# Patient Record
Sex: MALE | Race: ASIAN | NOT HISPANIC OR LATINO | URBAN - METROPOLITAN AREA
[De-identification: names, ages, dates, MRNs, and addresses within clinical notes are randomized per-mention and may not be internally consistent; named-entity substitution may affect disease eponyms.]

---

## 2019-11-04 ENCOUNTER — INPATIENT (INPATIENT)
Facility: HOSPITAL | Age: 79
LOS: 13 days | Discharge: NURSING, REHAB OR LONG-TERM CARE | DRG: 240 | End: 2019-11-18
Attending: INTERNAL MEDICINE | Admitting: INTERNAL MEDICINE
Payer: MEDICARE

## 2019-11-04 VITALS
OXYGEN SATURATION: 97 % | SYSTOLIC BLOOD PRESSURE: 158 MMHG | HEIGHT: 64.57 IN | TEMPERATURE: 98 F | DIASTOLIC BLOOD PRESSURE: 88 MMHG | HEART RATE: 102 BPM | WEIGHT: 143.96 LBS | RESPIRATION RATE: 18 BRPM

## 2019-11-04 DIAGNOSIS — E11.9 TYPE 2 DIABETES MELLITUS WITHOUT COMPLICATIONS: ICD-10-CM

## 2019-11-04 DIAGNOSIS — L03.031 CELLULITIS OF RIGHT TOE: ICD-10-CM

## 2019-11-04 DIAGNOSIS — I10 ESSENTIAL (PRIMARY) HYPERTENSION: ICD-10-CM

## 2019-11-04 DIAGNOSIS — M79.674 PAIN IN RIGHT TOE(S): ICD-10-CM

## 2019-11-04 DIAGNOSIS — Z29.9 ENCOUNTER FOR PROPHYLACTIC MEASURES, UNSPECIFIED: ICD-10-CM

## 2019-11-04 DIAGNOSIS — Z98.890 OTHER SPECIFIED POSTPROCEDURAL STATES: Chronic | ICD-10-CM

## 2019-11-04 DIAGNOSIS — Z71.89 OTHER SPECIFIED COUNSELING: ICD-10-CM

## 2019-11-04 LAB
ALBUMIN SERPL ELPH-MCNC: 3.2 G/DL — LOW (ref 3.5–5)
ALP SERPL-CCNC: 94 U/L — SIGNIFICANT CHANGE UP (ref 40–120)
ALT FLD-CCNC: 13 U/L DA — SIGNIFICANT CHANGE UP (ref 10–60)
ANION GAP SERPL CALC-SCNC: 7 MMOL/L — SIGNIFICANT CHANGE UP (ref 5–17)
APTT BLD: 31.7 SEC — SIGNIFICANT CHANGE UP (ref 27.5–36.3)
AST SERPL-CCNC: 9 U/L — LOW (ref 10–40)
B-OH-BUTYR SERPL-SCNC: 0.4 MMOL/L — SIGNIFICANT CHANGE UP
BASOPHILS # BLD AUTO: 0.02 K/UL — SIGNIFICANT CHANGE UP (ref 0–0.2)
BASOPHILS NFR BLD AUTO: 0.2 % — SIGNIFICANT CHANGE UP (ref 0–2)
BILIRUB SERPL-MCNC: 0.8 MG/DL — SIGNIFICANT CHANGE UP (ref 0.2–1.2)
BUN SERPL-MCNC: 20 MG/DL — HIGH (ref 7–18)
CALCIUM SERPL-MCNC: 9.1 MG/DL — SIGNIFICANT CHANGE UP (ref 8.4–10.5)
CHLORIDE SERPL-SCNC: 101 MMOL/L — SIGNIFICANT CHANGE UP (ref 96–108)
CK MB BLD-MCNC: 2.5 % — SIGNIFICANT CHANGE UP (ref 0–3.5)
CK MB CFR SERPL CALC: 1.5 NG/ML — SIGNIFICANT CHANGE UP (ref 0–3.6)
CK SERPL-CCNC: 61 U/L — SIGNIFICANT CHANGE UP (ref 35–232)
CO2 SERPL-SCNC: 26 MMOL/L — SIGNIFICANT CHANGE UP (ref 22–31)
CREAT SERPL-MCNC: 1.04 MG/DL — SIGNIFICANT CHANGE UP (ref 0.5–1.3)
CRP SERPL-MCNC: 4.07 MG/DL — HIGH (ref 0–0.4)
EOSINOPHIL # BLD AUTO: 0.02 K/UL — SIGNIFICANT CHANGE UP (ref 0–0.5)
EOSINOPHIL NFR BLD AUTO: 0.2 % — SIGNIFICANT CHANGE UP (ref 0–6)
ERYTHROCYTE [SEDIMENTATION RATE] IN BLOOD: 45 MM/HR — HIGH (ref 0–20)
GLUCOSE BLDC GLUCOMTR-MCNC: 161 MG/DL — HIGH (ref 70–99)
GLUCOSE BLDC GLUCOMTR-MCNC: 243 MG/DL — HIGH (ref 70–99)
GLUCOSE BLDC GLUCOMTR-MCNC: 337 MG/DL — HIGH (ref 70–99)
GLUCOSE BLDC GLUCOMTR-MCNC: 358 MG/DL — HIGH (ref 70–99)
GLUCOSE BLDC GLUCOMTR-MCNC: 363 MG/DL — HIGH (ref 70–99)
GLUCOSE SERPL-MCNC: 394 MG/DL — HIGH (ref 70–99)
HCT VFR BLD CALC: 39.1 % — SIGNIFICANT CHANGE UP (ref 39–50)
HGB BLD-MCNC: 13.7 G/DL — SIGNIFICANT CHANGE UP (ref 13–17)
IMM GRANULOCYTES NFR BLD AUTO: 0.2 % — SIGNIFICANT CHANGE UP (ref 0–1.5)
INR BLD: 1.22 RATIO — HIGH (ref 0.88–1.16)
LACTATE SERPL-SCNC: 1.7 MMOL/L — SIGNIFICANT CHANGE UP (ref 0.7–2)
LYMPHOCYTES # BLD AUTO: 1.52 K/UL — SIGNIFICANT CHANGE UP (ref 1–3.3)
LYMPHOCYTES # BLD AUTO: 15.8 % — SIGNIFICANT CHANGE UP (ref 13–44)
MCHC RBC-ENTMCNC: 34 PG — SIGNIFICANT CHANGE UP (ref 27–34)
MCHC RBC-ENTMCNC: 35 GM/DL — SIGNIFICANT CHANGE UP (ref 32–36)
MCV RBC AUTO: 97 FL — SIGNIFICANT CHANGE UP (ref 80–100)
MONOCYTES # BLD AUTO: 0.7 K/UL — SIGNIFICANT CHANGE UP (ref 0–0.9)
MONOCYTES NFR BLD AUTO: 7.3 % — SIGNIFICANT CHANGE UP (ref 2–14)
NEUTROPHILS # BLD AUTO: 7.35 K/UL — SIGNIFICANT CHANGE UP (ref 1.8–7.4)
NEUTROPHILS NFR BLD AUTO: 76.3 % — SIGNIFICANT CHANGE UP (ref 43–77)
NRBC # BLD: 0 /100 WBCS — SIGNIFICANT CHANGE UP (ref 0–0)
PLATELET # BLD AUTO: 284 K/UL — SIGNIFICANT CHANGE UP (ref 150–400)
POTASSIUM SERPL-MCNC: 4.2 MMOL/L — SIGNIFICANT CHANGE UP (ref 3.5–5.3)
POTASSIUM SERPL-SCNC: 4.2 MMOL/L — SIGNIFICANT CHANGE UP (ref 3.5–5.3)
PROT SERPL-MCNC: 7.8 G/DL — SIGNIFICANT CHANGE UP (ref 6–8.3)
PROTHROM AB SERPL-ACNC: 13.6 SEC — HIGH (ref 10–12.9)
RBC # BLD: 4.03 M/UL — LOW (ref 4.2–5.8)
RBC # FLD: 12 % — SIGNIFICANT CHANGE UP (ref 10.3–14.5)
SODIUM SERPL-SCNC: 134 MMOL/L — LOW (ref 135–145)
TROPONIN I SERPL-MCNC: 0.02 NG/ML — SIGNIFICANT CHANGE UP (ref 0–0.04)
WBC # BLD: 9.63 K/UL — SIGNIFICANT CHANGE UP (ref 3.8–10.5)
WBC # FLD AUTO: 9.63 K/UL — SIGNIFICANT CHANGE UP (ref 3.8–10.5)

## 2019-11-04 PROCEDURE — 73660 X-RAY EXAM OF TOE(S): CPT | Mod: 26,RT

## 2019-11-04 PROCEDURE — 99223 1ST HOSP IP/OBS HIGH 75: CPT

## 2019-11-04 PROCEDURE — 99222 1ST HOSP IP/OBS MODERATE 55: CPT

## 2019-11-04 PROCEDURE — 99285 EMERGENCY DEPT VISIT HI MDM: CPT

## 2019-11-04 PROCEDURE — 71045 X-RAY EXAM CHEST 1 VIEW: CPT | Mod: 26

## 2019-11-04 RX ORDER — INSULIN GLARGINE 100 [IU]/ML
5 INJECTION, SOLUTION SUBCUTANEOUS AT BEDTIME
Refills: 0 | Status: DISCONTINUED | OUTPATIENT
Start: 2019-11-04 | End: 2019-11-05

## 2019-11-04 RX ORDER — POVIDONE-IODINE 5 %
1 AEROSOL (ML) TOPICAL ONCE
Refills: 0 | Status: COMPLETED | OUTPATIENT
Start: 2019-11-04 | End: 2019-11-04

## 2019-11-04 RX ORDER — LISINOPRIL 2.5 MG/1
2.5 TABLET ORAL DAILY
Refills: 0 | Status: DISCONTINUED | OUTPATIENT
Start: 2019-11-04 | End: 2019-11-05

## 2019-11-04 RX ORDER — INSULIN LISPRO 100/ML
VIAL (ML) SUBCUTANEOUS
Refills: 0 | Status: DISCONTINUED | OUTPATIENT
Start: 2019-11-04 | End: 2019-11-05

## 2019-11-04 RX ORDER — INFLUENZA VIRUS VACCINE 15; 15; 15; 15 UG/.5ML; UG/.5ML; UG/.5ML; UG/.5ML
0.5 SUSPENSION INTRAMUSCULAR ONCE
Refills: 0 | Status: DISCONTINUED | OUTPATIENT
Start: 2019-11-04 | End: 2019-11-18

## 2019-11-04 RX ORDER — SODIUM CHLORIDE 9 MG/ML
1000 INJECTION INTRAMUSCULAR; INTRAVENOUS; SUBCUTANEOUS
Refills: 0 | Status: DISCONTINUED | OUTPATIENT
Start: 2019-11-04 | End: 2019-11-07

## 2019-11-04 RX ORDER — OXYCODONE AND ACETAMINOPHEN 5; 325 MG/1; MG/1
1 TABLET ORAL EVERY 6 HOURS
Refills: 0 | Status: DISCONTINUED | OUTPATIENT
Start: 2019-11-04 | End: 2019-11-07

## 2019-11-04 RX ORDER — INSULIN LISPRO 100/ML
4 VIAL (ML) SUBCUTANEOUS ONCE
Refills: 0 | Status: COMPLETED | OUTPATIENT
Start: 2019-11-04 | End: 2019-11-04

## 2019-11-04 RX ORDER — VANCOMYCIN HCL 1 G
1000 VIAL (EA) INTRAVENOUS ONCE
Refills: 0 | Status: COMPLETED | OUTPATIENT
Start: 2019-11-04 | End: 2019-11-04

## 2019-11-04 RX ORDER — SENNA PLUS 8.6 MG/1
2 TABLET ORAL AT BEDTIME
Refills: 0 | Status: DISCONTINUED | OUTPATIENT
Start: 2019-11-04 | End: 2019-11-07

## 2019-11-04 RX ORDER — OXYCODONE AND ACETAMINOPHEN 5; 325 MG/1; MG/1
2 TABLET ORAL EVERY 6 HOURS
Refills: 0 | Status: DISCONTINUED | OUTPATIENT
Start: 2019-11-04 | End: 2019-11-07

## 2019-11-04 RX ORDER — MORPHINE SULFATE 50 MG/1
4 CAPSULE, EXTENDED RELEASE ORAL ONCE
Refills: 0 | Status: DISCONTINUED | OUTPATIENT
Start: 2019-11-04 | End: 2019-11-04

## 2019-11-04 RX ORDER — PIPERACILLIN AND TAZOBACTAM 4; .5 G/20ML; G/20ML
3.38 INJECTION, POWDER, LYOPHILIZED, FOR SOLUTION INTRAVENOUS ONCE
Refills: 0 | Status: COMPLETED | OUTPATIENT
Start: 2019-11-04 | End: 2019-11-04

## 2019-11-04 RX ORDER — OXYCODONE AND ACETAMINOPHEN 5; 325 MG/1; MG/1
1 TABLET ORAL EVERY 6 HOURS
Refills: 0 | Status: DISCONTINUED | OUTPATIENT
Start: 2019-11-04 | End: 2019-11-04

## 2019-11-04 RX ORDER — PIPERACILLIN AND TAZOBACTAM 4; .5 G/20ML; G/20ML
3.38 INJECTION, POWDER, LYOPHILIZED, FOR SOLUTION INTRAVENOUS EVERY 8 HOURS
Refills: 0 | Status: DISCONTINUED | OUTPATIENT
Start: 2019-11-04 | End: 2019-11-05

## 2019-11-04 RX ORDER — ENOXAPARIN SODIUM 100 MG/ML
40 INJECTION SUBCUTANEOUS DAILY
Refills: 0 | Status: DISCONTINUED | OUTPATIENT
Start: 2019-11-04 | End: 2019-11-06

## 2019-11-04 RX ORDER — ACETAMINOPHEN 500 MG
650 TABLET ORAL EVERY 6 HOURS
Refills: 0 | Status: DISCONTINUED | OUTPATIENT
Start: 2019-11-04 | End: 2019-11-07

## 2019-11-04 RX ORDER — VANCOMYCIN HCL 1 G
1000 VIAL (EA) INTRAVENOUS EVERY 12 HOURS
Refills: 0 | Status: DISCONTINUED | OUTPATIENT
Start: 2019-11-04 | End: 2019-11-05

## 2019-11-04 RX ORDER — OXYCODONE AND ACETAMINOPHEN 5; 325 MG/1; MG/1
1 TABLET ORAL ONCE
Refills: 0 | Status: DISCONTINUED | OUTPATIENT
Start: 2019-11-04 | End: 2019-11-04

## 2019-11-04 RX ORDER — SODIUM CHLORIDE 9 MG/ML
1000 INJECTION INTRAMUSCULAR; INTRAVENOUS; SUBCUTANEOUS ONCE
Refills: 0 | Status: COMPLETED | OUTPATIENT
Start: 2019-11-04 | End: 2019-11-04

## 2019-11-04 RX ADMIN — PIPERACILLIN AND TAZOBACTAM 25 GRAM(S): 4; .5 INJECTION, POWDER, LYOPHILIZED, FOR SOLUTION INTRAVENOUS at 22:58

## 2019-11-04 RX ADMIN — OXYCODONE AND ACETAMINOPHEN 2 TABLET(S): 5; 325 TABLET ORAL at 17:57

## 2019-11-04 RX ADMIN — Medication 1: at 12:33

## 2019-11-04 RX ADMIN — PIPERACILLIN AND TAZOBACTAM 200 GRAM(S): 4; .5 INJECTION, POWDER, LYOPHILIZED, FOR SOLUTION INTRAVENOUS at 08:01

## 2019-11-04 RX ADMIN — Medication 2: at 17:45

## 2019-11-04 RX ADMIN — SENNA PLUS 2 TABLET(S): 8.6 TABLET ORAL at 22:58

## 2019-11-04 RX ADMIN — Medication 5: at 22:58

## 2019-11-04 RX ADMIN — OXYCODONE AND ACETAMINOPHEN 2 TABLET(S): 5; 325 TABLET ORAL at 23:44

## 2019-11-04 RX ADMIN — Medication 5 MILLIGRAM(S): at 22:58

## 2019-11-04 RX ADMIN — SODIUM CHLORIDE 1000 MILLILITER(S): 9 INJECTION INTRAMUSCULAR; INTRAVENOUS; SUBCUTANEOUS at 06:13

## 2019-11-04 RX ADMIN — LISINOPRIL 2.5 MILLIGRAM(S): 2.5 TABLET ORAL at 17:47

## 2019-11-04 RX ADMIN — ENOXAPARIN SODIUM 40 MILLIGRAM(S): 100 INJECTION SUBCUTANEOUS at 16:37

## 2019-11-04 RX ADMIN — MORPHINE SULFATE 4 MILLIGRAM(S): 50 CAPSULE, EXTENDED RELEASE ORAL at 08:01

## 2019-11-04 RX ADMIN — OXYCODONE AND ACETAMINOPHEN 1 TABLET(S): 5; 325 TABLET ORAL at 05:42

## 2019-11-04 RX ADMIN — Medication 4 UNIT(S): at 09:29

## 2019-11-04 RX ADMIN — Medication 1 APPLICATION(S): at 13:45

## 2019-11-04 RX ADMIN — MORPHINE SULFATE 4 MILLIGRAM(S): 50 CAPSULE, EXTENDED RELEASE ORAL at 09:17

## 2019-11-04 RX ADMIN — Medication 250 MILLIGRAM(S): at 17:57

## 2019-11-04 RX ADMIN — SODIUM CHLORIDE 70 MILLILITER(S): 9 INJECTION INTRAMUSCULAR; INTRAVENOUS; SUBCUTANEOUS at 18:07

## 2019-11-04 RX ADMIN — Medication 250 MILLIGRAM(S): at 08:59

## 2019-11-04 RX ADMIN — INSULIN GLARGINE 5 UNIT(S): 100 INJECTION, SOLUTION SUBCUTANEOUS at 22:57

## 2019-11-04 RX ADMIN — OXYCODONE AND ACETAMINOPHEN 2 TABLET(S): 5; 325 TABLET ORAL at 16:37

## 2019-11-04 RX ADMIN — OXYCODONE AND ACETAMINOPHEN 1 TABLET(S): 5; 325 TABLET ORAL at 07:03

## 2019-11-04 RX ADMIN — OXYCODONE AND ACETAMINOPHEN 2 TABLET(S): 5; 325 TABLET ORAL at 22:58

## 2019-11-04 NOTE — CONSULT NOTE ADULT - SUBJECTIVE AND OBJECTIVE BOX
HPI:    79yoM with h/o DM, HTN, presents with toe pain. Reports R 2nd toe pain since tripping and falling on it at [employer] 2 days ago. Saw PMD Dr. David Domínguez who prescribed gabapentin and oxycodone, pt states no imaging at that time. Reports numbness to the toe as well. Lives alone, ambulates with cane at baseline, states 2/2 the pain has been having difficulty getting around and has not been eating, and feels that he needs to stay in the hospital. Denies CP, SOB.    Interval HPI:   Pt tripped on R toe and fell; noticed a cut on a toe, applied tar ointment and controlled pain with medications. Overnight, excruciating pain with increased numbness, came to ED for evaluation. Pt with h/o HTN and diabetes, however does not take medications regularly due to upset stomach. Finger sticks at home ranges from 130-40. No h/o foot wounds, no prior vascular testing, not on AC. Denies fever or chills, CP or SOB. At home, intermittent cold sensation of feet but no pain a/w claudication.       REVIEW OF SYSTEMS:   Negative except stated above in HPI      Allergies    No Known Allergies    Intolerances    	    MEDICATIONS:    vancomycin  IVPB 1000 milliGRAM(s) IV Intermittent once        PAST MEDICAL & SURGICAL HISTORY:  HTN  DM            T(C): 36.8 (11-04-19 @ 07:10), Max: 36.8 (11-04-19 @ 07:10)  HR: 70 (11-04-19 @ 07:10) (70 - 102)  BP: 142/59 (11-04-19 @ 07:10) (142/59 - 158/88)  RR: 18 (11-04-19 @ 07:10) (18 - 18)  SpO2: 98% (11-04-19 @ 07:10) (97% - 98%)  I&O's Summary      PHYSICAL EXAM:   General: Alert and oriented, not in acute distress  Cardiovascular: Regular rate and rhythm  Respiratory: Breathing unlabored  Gastrointestinal:  Soft, nondistended, nontender  Extremities: Full ROM  Foot Exam:    R foot- 2nd phalangeal erythema, swelling, distally purple, ~1cm open wound on posterior 2nd toe, grossly appears infected with extensive cellulitis spreading to dorsum of foot, markedly tender to touch, no crepitus or induration beyond erythematous margin; sensation and motor function intact  DP palpable on both feet, warm to touch      LABS:	 	    CBC Full  -  ( 04 Nov 2019 06:14 )  WBC Count : 9.63 K/uL  Hemoglobin : 13.7 g/dL  Hematocrit : 39.1 %  Platelet Count - Automated : 284 K/uL  Mean Cell Volume : 97.0 fl  Mean Cell Hemoglobin : 34.0 pg  Mean Cell Hemoglobin Concentration : 35.0 gm/dL  Auto Neutrophil # : 7.35 K/uL  Auto Lymphocyte # : 1.52 K/uL  Auto Monocyte # : 0.70 K/uL  Auto Eosinophil # : 0.02 K/uL  Auto Basophil # : 0.02 K/uL  Auto Neutrophil % : 76.3 %  Auto Lymphocyte % : 15.8 %  Auto Monocyte % : 7.3 %  Auto Eosinophil % : 0.2 %  Auto Basophil % : 0.2 %    11-04    134<L>  |  101  |  20<H>  ----------------------------<  394<H>  4.2   |  26  |  1.04    Ca    9.1      04 Nov 2019 06:14    TPro  7.8  /  Alb  3.2<L>  /  TBili  0.8  /  DBili  x   /  AST  9<L>  /  ALT  13  /  AlkPhos  94  11-04

## 2019-11-04 NOTE — H&P ADULT - ATTENDING COMMENTS
Patient seen and examined ; case was discussed with the admitting resident    ROS: as in the HPI; all other ROS negative    SH and family history as above    Vital Signs Last 24 Hrs  T(C): 36.8 (04 Nov 2019 07:10), Max: 36.8 (04 Nov 2019 07:10)  T(F): 98.3 (04 Nov 2019 07:10), Max: 98.3 (04 Nov 2019 07:10)  HR: 70 (04 Nov 2019 07:10) (70 - 102)  BP: 142/59 (04 Nov 2019 07:10) (142/59 - 158/88)  BP(mean): --  RR: 18 (04 Nov 2019 07:10) (18 - 18)  SpO2: 98% (04 Nov 2019 07:10) (97% - 98%)    GEN: NAD  HEENT- normocephalic; mouth moist  CVS- S1S2+  LUNGS- clear to auscultation; no wheezing  ABD: Soft , nontender, nondistended, Bowel sounds are present  EXTREMITY: no calf tenderness, no cyanosis, no edema, RLE - 2nd digit with purpuric appearance with ecchymosis over dorsum of foot, good pulses b/l, no crepitus, tender, no obvious drainage. LLE without injury, good pulse   NEURO: AAOx3; non focal neurologic exam; cranial nerves grossly intact  PSYCH: normal affect and behavior  BACK: no swelling or mass;   VASCULAR: ++ distal peripheral pulses  SKIN: warm and dry.       Labs Reviewed:                         13.7   9.63  )-----------( 284      ( 04 Nov 2019 06:14 )             39.1     11-04    134<L>  |  101  |  20<H>  ----------------------------<  394<H>  4.2   |  26  |  1.04    Ca    9.1      04 Nov 2019 06:14    TPro  7.8  /  Alb  3.2<L>  /  TBili  0.8  /  DBili  x   /  AST  9<L>  /  ALT  13  /  AlkPhos  94  11-04    CARDIAC MARKERS ( 04 Nov 2019 06:14 )  0.021 ng/mL / x     / x     / x     / x      PT/INR - ( 04 Nov 2019 06:14 )   PT: 13.6 sec;   INR: 1.22 ratio    PTT - ( 04 Nov 2019 06:14 )  PTT:31.7 sec  BNP:   MEDICATIONS  (STANDING):  vancomycin  IVPB 1000 milliGRAM(s) IV Intermittent once  Foot Xray reviewed    80 y/o M with DM, HTN admitted with 2nd right toe infection, possible underlying OM. Initially occurred with trauma, tripped over pothole on 10/29/19, had worsening pain and then noticed black color on toe with severe pain. Patient does not know his medications or his pharmacy but has regular follow up with his pcp.     1. Diabetic foot wound, cellulitis possible gangrene- empiric antibiotics, podiatry consult, f/u final read of xray, esr elevated. Continue broad spectrum coverage, appreciate ID consultation.   2. Uncontrolled DM with hyperglycemia- not on insulin at home, check A1C, goal 140-180mg/dL would give weight based basal dose and monitor SSI requirements. Might need endocrinology consult if needing insulin prior to dc.   3. Dehydration- osmotic diuresis and decreased po   4. HTN- unknown medical regimen, would start ACE-i in setting of DM, records from PCP would be helpful.   5. HLD   6. Pseudohyponatremia   Plan of care discussed with patient ;  all questions and concerns were addressed.  Discussed with Patient's family Patient seen and examined ; case was discussed with the admitting resident    ROS: as in the HPI; all other ROS negative    SH as above FH positive for Diabetes    Vital Signs Last 24 Hrs  T(C): 36.8 (04 Nov 2019 07:10), Max: 36.8 (04 Nov 2019 07:10)  T(F): 98.3 (04 Nov 2019 07:10), Max: 98.3 (04 Nov 2019 07:10)  HR: 70 (04 Nov 2019 07:10) (70 - 102)  BP: 142/59 (04 Nov 2019 07:10) (142/59 - 158/88)  BP(mean): --  RR: 18 (04 Nov 2019 07:10) (18 - 18)  SpO2: 98% (04 Nov 2019 07:10) (97% - 98%)    GEN: NAD  HEENT- normocephalic; mouth moist  CVS- S1S2+  LUNGS- clear to auscultation; no wheezing  ABD: Soft , nontender, nondistended, Bowel sounds are present  EXTREMITY: no calf tenderness, no cyanosis, no edema, RLE - 2nd digit with purpuric appearance with ecchymosis over dorsum of foot, good pulses b/l, no crepitus, tender, no obvious drainage. LLE without injury, good pulse   NEURO: AAOx3; non focal neurologic exam; cranial nerves grossly intact  PSYCH: normal affect and behavior  BACK: no swelling or mass;   VASCULAR: ++ distal peripheral pulses  SKIN: warm and dry.       Labs Reviewed:                         13.7   9.63  )-----------( 284      ( 04 Nov 2019 06:14 )             39.1     11-04    134<L>  |  101  |  20<H>  ----------------------------<  394<H>  4.2   |  26  |  1.04    Ca    9.1      04 Nov 2019 06:14    TPro  7.8  /  Alb  3.2<L>  /  TBili  0.8  /  DBili  x   /  AST  9<L>  /  ALT  13  /  AlkPhos  94  11-04    CARDIAC MARKERS ( 04 Nov 2019 06:14 )  0.021 ng/mL / x     / x     / x     / x      PT/INR - ( 04 Nov 2019 06:14 )   PT: 13.6 sec;   INR: 1.22 ratio    PTT - ( 04 Nov 2019 06:14 )  PTT:31.7 sec  BNP:   MEDICATIONS  (STANDING):  vancomycin  IVPB 1000 milliGRAM(s) IV Intermittent once  Foot Xray reviewed    78 y/o M with DM, HTN admitted with 2nd right toe infection, possible underlying OM. Initially occurred with trauma, tripped over pothole on 10/29/19, had worsening pain and then noticed black color on toe with severe pain. Patient does not know his medications or his pharmacy but has regular follow up with his pcp.     1. Diabetic foot wound, cellulitis possible gangrene- empiric antibiotics, podiatry consult, f/u final read of xray, esr elevated. Continue broad spectrum coverage, appreciate ID consultation.   2. Uncontrolled DM with hyperglycemia- not on insulin at home, check A1C, goal 140-180mg/dL would give weight based basal dose and monitor SSI requirements. Might need endocrinology consult if needing insulin prior to dc.   3. Dehydration- osmotic diuresis and decreased po   4. HTN- unknown medical regimen, would start ACE-i in setting of DM, records from PCP would be helpful.   5. HLD   6. Pseudohyponatremia   Plan of care discussed with patient ;  all questions and concerns were addressed.  Discussed with Patient's family

## 2019-11-04 NOTE — H&P ADULT - ASSESSMENT
Serbian speaking male from home lives alone   no fever   PMH DM , HTn , Cholesterol  PSH open heart surgery       Pt is FULL CODE.  pt is admitted for management of Cellulitis     right 2nd Toeleg Cellulitis with erythema, swelling, distally purple  - warm to touch with some tenderness and cool at the tip 79 year old Syriac speaking male from home lives alone ambulates with cane at baseline with PMH of DM, HTN and PSH open heart surgery presents with toe pain. Reports R 2nd toe pain and wound since tripping and falling on it at [employer] on october 29th while wearing slippers . Saw PMD Dr. David Domínguez who prescribed gabapentin and oxycodone, noticed a cut on a toe, applied tar ointment and controlled pain with medications. pt states no imaging at that time. pt states 2/2 the pain has been having difficulty getting around and has not been eating, and feels that he needs to stay in the hospital pt reported excruciating pain with increased numbness and black discoloration on toe with severe pain intermittent cold sensation of feet.  No h/o foot wounds, no prior vascular testing, not on AC. pt denies fever or chills, CP or SOB. nausea , vomiting , diarrhea , exposure to dirty water or mosquitoes bites.     Pt is FULL CODE.  pt is admitted for management of Cellulitis     ****Morning team to contact patients pharmacy for home med rec  [phone] or [phone] **** Pt does not remember meds and I called pharmacy but no response

## 2019-11-04 NOTE — ED PROVIDER NOTE - PHYSICAL EXAMINATION
Afebrile, hemodynamically stable, saturating well  NAD, nontoxic appearing  Head NCAT  EOMI grossly, anicteric  MM dry  RRR, nml S1/S2, no m/r/g  Lungs CTAB, no w/r/r  Abd soft, NT, ND, nml BS, no rebound or guarding  AAO, CN's 3-12 grossly intact  Skin warm, dry  R 2nd toe with purplish discoloration, ventral abrasion, erythema spreading to dorsal foot, small hematoma to the tip of toe and generalized swelling, no gross deformity, no crepitus

## 2019-11-04 NOTE — H&P ADULT - PROBLEM SELECTOR PLAN 3
- Pt does not remember his meds   - will start Lisinopril  for now with parameters (given hx of DM)  - Monitor BP closely and adjust medications if clinically indicated.  - DASH diet.    **** Morning team to contact patients pharmacy for home med rec  [phone] or [phone] **** Pt does not remember meds and I called pharmacy but no response

## 2019-11-04 NOTE — ED PROVIDER NOTE - CLINICAL SUMMARY MEDICAL DECISION MAKING FREE TEXT BOX
No e/o fracture. Concern for cellulitis, have low suspicion for nec fasc. Pt with pain despite PO meds, requiring IV morphine, with reported numbness, have mild concern for possible compartment syndrome of the toe. No e/o DKA. D/w vasc surg who will see pt. Given abx and antibiotics. Patient well appearing, hemodynamically stable. Admitted to internal medicine for further monitoring, w/u, and care. No e/o fracture. Concern for cellulitis, have low suspicion for nec fasc. Pt with pain despite PO meds, requiring IV morphine, with reported numbness, have mild concern for possible compartment syndrome of the toe. No e/o DKA. D/w vasc surg who will see pt. Given abx and antibiotics. Patient well appearing, hemodynamically stable. Admitted to internal medicine for further monitoring, w/u, and care. D/w MAR and will also f/u troponin and attempt to contact podiatry. No e/o fracture. Concern for cellulitis, have low suspicion for nec fasc. Pt with pain despite PO meds, requiring IV morphine, with reported numbness, have mild concern for possible compartment syndrome of the toe. No e/o DKA. D/w vasc surg who will see pt. Given abx and antibiotics. Patient well appearing, hemodynamically stable. Admitted to internal medicine for further monitoring, w/u, and care. D/w MAR and will also f/u troponin (asymptomatic in terms of ACS) and attempt to contact podiatry.

## 2019-11-04 NOTE — ED ADULT TRIAGE NOTE - CHIEF COMPLAINT QUOTE
tripped on a rock x 2days ago, with swelling and discoloration to right second toe tripped on a rock x 2days ago, with swelling and discoloration to right second,middle  toe

## 2019-11-04 NOTE — ED PROVIDER NOTE - PROGRESS NOTE DETAILS
D/w Janneth of surgery for vascular consult, who will see patient. Unsuccessful at reaching podiatry attending or resident to date

## 2019-11-04 NOTE — H&P ADULT - PROBLEM SELECTOR PLAN 5
Goals of care discussed with patient  meaning of CPR described in detail and wants everything to be done after understanding risk associated with age.  Pt is FULL CODE.

## 2019-11-04 NOTE — CONSULT NOTE ADULT - SUBJECTIVE AND OBJECTIVE BOX
Chief Complaint : Patient is a 79y old  Male who presents with a chief complaint of 2nd right toe pain (04 Nov 2019 08:38)    Pt was seen bedside in ED holding complaining of pain in his right second toe. Pt states he fell four days ago and came for pain. Pt states he is a diabetic but does not like to take his medications. Pt states he does not see a foot doctor. Pt states he has pain and numbness in his feet and had the pain and numbness before the fall as well.       Patient admits to  (-) Fevers, (-) Chills, (-) Nausea, (-) Vomiting, (-) Shortness of Breath      PMH:   PSH:    Allergies:No Known Allergies      Labs:                          13.7   9.63  )-----------( 284      ( 04 Nov 2019 06:14 )             39.1     WBC Trend  9.63 Date (11-04 @ 06:14)      Chem  11-04    134<L>  |  101  |  20<H>  ----------------------------<  394<H>  4.2   |  26  |  1.04    Ca    9.1      04 Nov 2019 06:14    TPro  7.8  /  Alb  3.2<L>  /  TBili  0.8  /  DBili  x   /  AST  9<L>  /  ALT  13  /  AlkPhos  94  11-04          T(F): 98.3 (11-04-19 @ 07:10), Max: 98.3 (11-04-19 @ 07:10)  HR: 70 (11-04-19 @ 07:10) (70 - 102)  BP: 142/59 (11-04-19 @ 07:10) (142/59 - 158/88)  RR: 18 (11-04-19 @ 07:10) (18 - 18)  SpO2: 98% (11-04-19 @ 07:10) (97% - 98%)  Wt(kg): --    O:   General: Pleasant  male NAD & AOX3.    Integument:  Skin warm, dry and supple bilateral.    Derm: right second digit has distal hematoma -fluctuance, erythema dorsal foot and plantar second digit, 0.5x0.4x0.1cm wound plantar second digit with surrounding erythema, no drainage, -PTB, -malodor   Vascular: Dorsalis Pedis and Posterior Tibial pulses 2/4.  Capillary re-fill time less then 3 seconds digits 1-5 bilateral. TG warm to cool b/l, cool to the second digit   Neuro: Protective sensation diminished to the level of the digits bilateral.  MSK: Muscle strength 5/5 all major muscle groups bilateral.      A: distal hematoma right second digit   plantar ulcer right second digit       P:   Chart reviewed and Patient evaluated  Discussed diagnosis and treatment with patient  X-rays reviewed - negative for fracture   Ordered LESLIE  Will monitor wound   Apply betadine DSD to wound   Pt to heel WB right foot, full WB left foot   Discussed importance of daily foot examinations and proper shoe gear and to importance of lower Fasting Blood Glucose levels.   Discussed with Attending

## 2019-11-04 NOTE — ED ADULT NURSE NOTE - NSIMPLEMENTINTERV_GEN_ALL_ED
Implemented All Fall Risk Interventions:  [place] to call system. Call bell, personal items and telephone within reach. Instruct patient to call for assistance. Room bathroom lighting operational. Non-slip footwear when patient is off stretcher. Physically safe environment: no spills, clutter or unnecessary equipment. Stretcher in lowest position, wheels locked, appropriate side rails in place. Provide visual cue, wrist band, yellow gown, etc. Monitor gait and stability. Monitor for mental status changes and reorient to person, place, and time. Review medications for side effects contributing to fall risk. Reinforce activity limits and safety measures with patient and family.

## 2019-11-04 NOTE — H&P ADULT - PROBLEM SELECTOR PLAN 1
- p/w right 2nd Toeleg Cellulitis with erythema, swelling, distally purple  - warm to touch with some tenderness and cool at the tip   - s/p Vanco adn Zosyn in ED  - No Leukocytosis   - Afebrile   - c/w vanco and Zosyn   - Recommended right leg elevation to the patient  - lactate 1.7  - ESR 45   - CRP 4.07  - x-ray right foot : no osteomyelitis of toes   - c/w pain meds Tylenol and oxycodone PRN   - c/w senna and dulcolax  - f/u LESLIE   - f/u Blood Cx (Specimen received)  - f/u swab cx of wound  - f/u PT  - Podiatry consult noted  - Vacular Consult noted   ** ID consulted Dr. Corral

## 2019-11-04 NOTE — H&P ADULT - PROBLEM SELECTOR PLAN 4
IMPROVE VTE Individual Risk Assessment    RISK                                                                Points    [  ] Previous VTE                                                  3    [  ] Thrombophilia                                               2    [  ] Lower limb paralysis                                      2        (unable to hold up >15 seconds)    [  ] Current Cancer                                              2         (within 6 months)  [X] Immobilization > 24 hrs                                1  [  ] ICU/CCU stay > 24 hours                              1  [X] Age > 60                                                      1    IMPROVE VTE Score _____2____  c/w Lovenox 40 mg qd

## 2019-11-04 NOTE — ED PROVIDER NOTE - CARE PLAN
Principal Discharge DX:	Toe pain, right  Secondary Diagnosis:	Cellulitis of toe of right foot  Secondary Diagnosis:	Ambulatory dysfunction  Secondary Diagnosis:	Dehydration  Secondary Diagnosis:	Hyperglycemia

## 2019-11-04 NOTE — CONSULT NOTE ADULT - CONSULT REASON
Toe swelling and pain out of proportion Toe swelling and pain out of proportion, r/o compartment syndrome

## 2019-11-04 NOTE — H&P ADULT - HISTORY OF PRESENT ILLNESS
79 year old Croatian speaking male from home lives alone ambulates with cane at baseline with PMH of DM, HTN and PSH open heart surgery presents with toe pain. Reports R 2nd toe pain and wound since tripping and falling on it at [employer] on october 29th while wearing slippers . Saw PMD Dr. David Domínguez who prescribed gabapentin and oxycodone, noticed a cut on a toe, applied tar ointment and controlled pain with medications. pt states no imaging at that time. pt states 2/2 the pain has been having difficulty getting around and has not been eating, and feels that he needs to stay in the hospital pt reported excruciating pain with increased numbness and black discoloration on toe with severe pain intermittent cold sensation of feet.  No h/o foot wounds, no prior vascular testing, not on AC. pt denies fever or chills, CP or SOB. nausea , vomiting , diarrhea , exposure to dirty water or mosquitoes bites.     Pt is FULL CODE.

## 2019-11-04 NOTE — ED ADULT NURSE NOTE - OBJECTIVE STATEMENT
Pt was BIB EMS reporting pain to right second toe after tripping two days ago outside. Denied head injury. HX Diabetes. On assessment, pt reports that he has not eaten in 2 days because he can not ambulate. Toe is swollen, ecchymotic, redness to top of foot, and laceration in between great toe and second toe. Cane at bedside. All fall precautions in place. Pt was BIB EMS reporting pain to right second toe after tripping two days ago at grocery store. Denied head injury. HX Diabetes. On assessment, pt reports that he has not eaten in 2 days because he can not ambulate. Toe is swollen, ecchymotic, redness to top of foot, and skin breakdown to second toe. Cane at bedside. All fall precautions in place.

## 2019-11-04 NOTE — CONSULT NOTE ADULT - ASSESSMENT
79 yoM with h/o HTN, DM presents with infected R 2nd toe wound    no leukocytosis, afebrile, wound grossly appears infected with extending cellulitis, s/m/n/v intact    - consult podiatry, spoke to Dr. Green, f/u rec  - abx  - swab cx of wound  -   -   -  - 79 yoM with h/o HTN, DM presents with infected R 2nd toe wound    no leukocytosis, afebrile, wound grossly appears infected with extending cellulitis and possible gangrene distally, s/m/n/v intact    - consult podiatry, spoke to Dr. Green, f/u rec  - abx, f/u ID rec  - swab cx of wound  - glucose control  -   -  - 79 yoM with h/o HTN, DM presents with infected R 2nd toe wound    no leukocytosis, afebrile, wound grossly appears infected with extending cellulitis and possible gangrene distally, s/m/n/v intact with palpable DP b/l with good perfusion    - consult podiatry, spoke to Dr. Green, f/u rec  - abx, f/u ID rec  - swab cx of wound  - glucose control  - no vascular intervention indicated at this time  - pain control as needed  - d/w attending 79 yoM with h/o HTN, DM presents with infected R 2nd toe wound    no leukocytosis, afebrile, wound grossly appears infected with extending cellulitis and possible gangrene distally, s/m/n/v intact with palpable DP b/l with good perfusion    - consult podiatry, spoke to Dr. Green, f/u rec  - abx, f/u ID rec  - swab cx of wound  - glucose control  - obtain LESLIE/PVR  - pain control as needed  - d/w attending

## 2019-11-04 NOTE — ED PROVIDER NOTE - OBJECTIVE STATEMENT
79yoM with h/o DM, HTN, presents with toe pain. Reports R 2nd toe pain since tripping and falling on it at [employer] 2 days ago. Saw PMD Dr. David Domínguez who prescribed gabapentin and oxycodone, pt states no imaging at that time. Reports numbness to the toe as well. Lives alone, ambulates with cane at baseline, states 2/2 the pain has been having difficulty getting around and has not been eating, and feels that he needs to stay in the hospital. 79yoM with h/o DM, HTN, presents with toe pain. Reports R 2nd toe pain since tripping and falling on it at [employer] 2 days ago. Saw PMD Dr. David Domínguez who prescribed gabapentin and oxycodone, pt states no imaging at that time. Reports numbness to the toe as well. Lives alone, ambulates with cane at baseline, states 2/2 the pain has been having difficulty getting around and has not been eating, and feels that he needs to stay in the hospital. Denies CP, SOB.

## 2019-11-04 NOTE — H&P ADULT - PROBLEM SELECTOR PROBLEM 5
[facility]  DAILY TREATMENT NOTE    Date: 8/22/2018  Patients Name:  Vahid Hughes  YOB: 2012 (5 y.o.)  Gender:  female  MRN:  [id number]  Account #: [de-identified]    [facility] [place] Information: [place]   Goals of care, counseling/discussion

## 2019-11-04 NOTE — H&P ADULT - PROBLEM SELECTOR PLAN 2
- Pt does not remember his meds but he does not take insulin   - will start sliding scale  - Monitor blood sugars AC/HS and adjust as needed  - goal -180.   - f/u HgA1c  - Carbohydrate consistent diabetic diet with evening snacks.   ****Morning team to contact patients pharmacy for home med rec  [phone] or [phone] **** Pt does not remember meds and I called pharmacy but no response - Pt does not remember his meds but he does not take insulin   - will start sliding scale and Lantus 5 units at bedtime   - Monitor blood sugars AC/HS and adjust as needed  - goal -180.   - f/u HgA1c  - Carbohydrate consistent diabetic diet with evening snacks.   ****Morning team to contact patients pharmacy for home med rec  [phone] or [phone] **** Pt does not remember meds and I called pharmacy but no response

## 2019-11-04 NOTE — ED PROVIDER NOTE - CHILD ABUSE FACILITY
Patient position supine. Patient prepped and draped per unit standard.    Safety straps applied:Yes   H

## 2019-11-05 LAB
24R-OH-CALCIDIOL SERPL-MCNC: 16.4 NG/ML — LOW (ref 30–80)
ALBUMIN SERPL ELPH-MCNC: 2.9 G/DL — LOW (ref 3.5–5)
ALP SERPL-CCNC: 83 U/L — SIGNIFICANT CHANGE UP (ref 40–120)
ALT FLD-CCNC: 21 U/L DA — SIGNIFICANT CHANGE UP (ref 10–60)
ANION GAP SERPL CALC-SCNC: 7 MMOL/L — SIGNIFICANT CHANGE UP (ref 5–17)
AST SERPL-CCNC: 24 U/L — SIGNIFICANT CHANGE UP (ref 10–40)
BASOPHILS # BLD AUTO: 0.03 K/UL — SIGNIFICANT CHANGE UP (ref 0–0.2)
BASOPHILS NFR BLD AUTO: 0.3 % — SIGNIFICANT CHANGE UP (ref 0–2)
BILIRUB SERPL-MCNC: 1 MG/DL — SIGNIFICANT CHANGE UP (ref 0.2–1.2)
BUN SERPL-MCNC: 16 MG/DL — SIGNIFICANT CHANGE UP (ref 7–18)
CALCIUM SERPL-MCNC: 8.7 MG/DL — SIGNIFICANT CHANGE UP (ref 8.4–10.5)
CHLORIDE SERPL-SCNC: 99 MMOL/L — SIGNIFICANT CHANGE UP (ref 96–108)
CHOLEST SERPL-MCNC: 179 MG/DL — SIGNIFICANT CHANGE UP (ref 10–199)
CO2 SERPL-SCNC: 29 MMOL/L — SIGNIFICANT CHANGE UP (ref 22–31)
CREAT SERPL-MCNC: 0.85 MG/DL — SIGNIFICANT CHANGE UP (ref 0.5–1.3)
EOSINOPHIL # BLD AUTO: 0.07 K/UL — SIGNIFICANT CHANGE UP (ref 0–0.5)
EOSINOPHIL NFR BLD AUTO: 0.7 % — SIGNIFICANT CHANGE UP (ref 0–6)
FOLATE SERPL-MCNC: 14 NG/ML — SIGNIFICANT CHANGE UP
GLUCOSE BLDC GLUCOMTR-MCNC: 179 MG/DL — HIGH (ref 70–99)
GLUCOSE BLDC GLUCOMTR-MCNC: 272 MG/DL — HIGH (ref 70–99)
GLUCOSE BLDC GLUCOMTR-MCNC: 302 MG/DL — HIGH (ref 70–99)
GLUCOSE BLDC GLUCOMTR-MCNC: 316 MG/DL — HIGH (ref 70–99)
GLUCOSE BLDC GLUCOMTR-MCNC: 78 MG/DL — SIGNIFICANT CHANGE UP (ref 70–99)
GLUCOSE SERPL-MCNC: 253 MG/DL — HIGH (ref 70–99)
HBA1C BLD-MCNC: 10.9 % — HIGH (ref 4–5.6)
HCT VFR BLD CALC: 37.5 % — LOW (ref 39–50)
HDLC SERPL-MCNC: 45 MG/DL — SIGNIFICANT CHANGE UP
HGB BLD-MCNC: 12.9 G/DL — LOW (ref 13–17)
IMM GRANULOCYTES NFR BLD AUTO: 0.3 % — SIGNIFICANT CHANGE UP (ref 0–1.5)
LIPID PNL WITH DIRECT LDL SERPL: 114 MG/DL — SIGNIFICANT CHANGE UP
LYMPHOCYTES # BLD AUTO: 1.74 K/UL — SIGNIFICANT CHANGE UP (ref 1–3.3)
LYMPHOCYTES # BLD AUTO: 17.5 % — SIGNIFICANT CHANGE UP (ref 13–44)
MAGNESIUM SERPL-MCNC: 2.3 MG/DL — SIGNIFICANT CHANGE UP (ref 1.6–2.6)
MCHC RBC-ENTMCNC: 33.9 PG — SIGNIFICANT CHANGE UP (ref 27–34)
MCHC RBC-ENTMCNC: 34.4 GM/DL — SIGNIFICANT CHANGE UP (ref 32–36)
MCV RBC AUTO: 98.7 FL — SIGNIFICANT CHANGE UP (ref 80–100)
MONOCYTES # BLD AUTO: 0.73 K/UL — SIGNIFICANT CHANGE UP (ref 0–0.9)
MONOCYTES NFR BLD AUTO: 7.3 % — SIGNIFICANT CHANGE UP (ref 2–14)
NEUTROPHILS # BLD AUTO: 7.36 K/UL — SIGNIFICANT CHANGE UP (ref 1.8–7.4)
NEUTROPHILS NFR BLD AUTO: 73.9 % — SIGNIFICANT CHANGE UP (ref 43–77)
NRBC # BLD: 0 /100 WBCS — SIGNIFICANT CHANGE UP (ref 0–0)
PHOSPHATE SERPL-MCNC: 3.1 MG/DL — SIGNIFICANT CHANGE UP (ref 2.5–4.5)
PLATELET # BLD AUTO: 291 K/UL — SIGNIFICANT CHANGE UP (ref 150–400)
POTASSIUM SERPL-MCNC: 3.8 MMOL/L — SIGNIFICANT CHANGE UP (ref 3.5–5.3)
POTASSIUM SERPL-SCNC: 3.8 MMOL/L — SIGNIFICANT CHANGE UP (ref 3.5–5.3)
PROT SERPL-MCNC: 7 G/DL — SIGNIFICANT CHANGE UP (ref 6–8.3)
RBC # BLD: 3.8 M/UL — LOW (ref 4.2–5.8)
RBC # FLD: 11.9 % — SIGNIFICANT CHANGE UP (ref 10.3–14.5)
SODIUM SERPL-SCNC: 135 MMOL/L — SIGNIFICANT CHANGE UP (ref 135–145)
TOTAL CHOLESTEROL/HDL RATIO MEASUREMENT: 4 RATIO — SIGNIFICANT CHANGE UP (ref 3.4–9.6)
TRIGL SERPL-MCNC: 99 MG/DL — SIGNIFICANT CHANGE UP (ref 10–149)
TSH SERPL-MCNC: 1.94 UU/ML — SIGNIFICANT CHANGE UP (ref 0.34–4.82)
VIT B12 SERPL-MCNC: 653 PG/ML — SIGNIFICANT CHANGE UP (ref 232–1245)
WBC # BLD: 9.96 K/UL — SIGNIFICANT CHANGE UP (ref 3.8–10.5)
WBC # FLD AUTO: 9.96 K/UL — SIGNIFICANT CHANGE UP (ref 3.8–10.5)

## 2019-11-05 PROCEDURE — 99223 1ST HOSP IP/OBS HIGH 75: CPT

## 2019-11-05 PROCEDURE — 93923 UPR/LXTR ART STDY 3+ LVLS: CPT | Mod: 26

## 2019-11-05 PROCEDURE — 99232 SBSQ HOSP IP/OBS MODERATE 35: CPT | Mod: GC

## 2019-11-05 PROCEDURE — 99231 SBSQ HOSP IP/OBS SF/LOW 25: CPT

## 2019-11-05 RX ORDER — CEFAZOLIN SODIUM 1 G
1000 VIAL (EA) INJECTION ONCE
Refills: 0 | Status: COMPLETED | OUTPATIENT
Start: 2019-11-05 | End: 2019-11-05

## 2019-11-05 RX ORDER — SIMVASTATIN 20 MG/1
1 TABLET, FILM COATED ORAL
Qty: 0 | Refills: 0 | DISCHARGE

## 2019-11-05 RX ORDER — CLOPIDOGREL BISULFATE 75 MG/1
75 TABLET, FILM COATED ORAL DAILY
Refills: 0 | Status: DISCONTINUED | OUTPATIENT
Start: 2019-11-05 | End: 2019-11-06

## 2019-11-05 RX ORDER — SIMVASTATIN 20 MG/1
40 TABLET, FILM COATED ORAL AT BEDTIME
Refills: 0 | Status: DISCONTINUED | OUTPATIENT
Start: 2019-11-05 | End: 2019-11-07

## 2019-11-05 RX ORDER — ONDANSETRON 8 MG/1
4 TABLET, FILM COATED ORAL ONCE
Refills: 0 | Status: DISCONTINUED | OUTPATIENT
Start: 2019-11-05 | End: 2019-11-05

## 2019-11-05 RX ORDER — TAMSULOSIN HYDROCHLORIDE 0.4 MG/1
0.4 CAPSULE ORAL AT BEDTIME
Refills: 0 | Status: DISCONTINUED | OUTPATIENT
Start: 2019-11-05 | End: 2019-11-07

## 2019-11-05 RX ORDER — INSULIN LISPRO 100/ML
VIAL (ML) SUBCUTANEOUS
Refills: 0 | Status: DISCONTINUED | OUTPATIENT
Start: 2019-11-05 | End: 2019-11-07

## 2019-11-05 RX ORDER — CLOPIDOGREL BISULFATE 75 MG/1
1 TABLET, FILM COATED ORAL
Qty: 0 | Refills: 0 | DISCHARGE

## 2019-11-05 RX ORDER — ICOSAPENT ETHYL 500 MG/1
2 CAPSULE, LIQUID FILLED ORAL
Qty: 0 | Refills: 0 | DISCHARGE

## 2019-11-05 RX ORDER — TAMSULOSIN HYDROCHLORIDE 0.4 MG/1
1 CAPSULE ORAL
Qty: 0 | Refills: 0 | DISCHARGE

## 2019-11-05 RX ORDER — IBUPROFEN 200 MG
400 TABLET ORAL ONCE
Refills: 0 | Status: COMPLETED | OUTPATIENT
Start: 2019-11-05 | End: 2019-11-05

## 2019-11-05 RX ORDER — INSULIN LISPRO 100/ML
2 VIAL (ML) SUBCUTANEOUS
Refills: 0 | Status: DISCONTINUED | OUTPATIENT
Start: 2019-11-05 | End: 2019-11-05

## 2019-11-05 RX ORDER — INSULIN GLARGINE 100 [IU]/ML
11 INJECTION, SOLUTION SUBCUTANEOUS AT BEDTIME
Refills: 0 | Status: DISCONTINUED | OUTPATIENT
Start: 2019-11-05 | End: 2019-11-06

## 2019-11-05 RX ORDER — LOSARTAN POTASSIUM 100 MG/1
1 TABLET, FILM COATED ORAL
Qty: 0 | Refills: 0 | DISCHARGE

## 2019-11-05 RX ORDER — METOPROLOL TARTRATE 50 MG
1 TABLET ORAL
Qty: 0 | Refills: 0 | DISCHARGE

## 2019-11-05 RX ORDER — METOPROLOL TARTRATE 50 MG
12.5 TABLET ORAL
Refills: 0 | Status: DISCONTINUED | OUTPATIENT
Start: 2019-11-05 | End: 2019-11-05

## 2019-11-05 RX ORDER — LOSARTAN POTASSIUM 100 MG/1
50 TABLET, FILM COATED ORAL DAILY
Refills: 0 | Status: DISCONTINUED | OUTPATIENT
Start: 2019-11-05 | End: 2019-11-07

## 2019-11-05 RX ORDER — CEFAZOLIN SODIUM 1 G
VIAL (EA) INJECTION
Refills: 0 | Status: DISCONTINUED | OUTPATIENT
Start: 2019-11-05 | End: 2019-11-07

## 2019-11-05 RX ORDER — METOPROLOL TARTRATE 50 MG
12.5 TABLET ORAL
Refills: 0 | Status: DISCONTINUED | OUTPATIENT
Start: 2019-11-05 | End: 2019-11-07

## 2019-11-05 RX ORDER — INSULIN LISPRO 100/ML
7 VIAL (ML) SUBCUTANEOUS
Refills: 0 | Status: DISCONTINUED | OUTPATIENT
Start: 2019-11-05 | End: 2019-11-06

## 2019-11-05 RX ORDER — INSULIN LISPRO 100/ML
5 VIAL (ML) SUBCUTANEOUS
Refills: 0 | Status: DISCONTINUED | OUTPATIENT
Start: 2019-11-05 | End: 2019-11-05

## 2019-11-05 RX ORDER — CEFAZOLIN SODIUM 1 G
1000 VIAL (EA) INJECTION EVERY 8 HOURS
Refills: 0 | Status: DISCONTINUED | OUTPATIENT
Start: 2019-11-05 | End: 2019-11-07

## 2019-11-05 RX ORDER — MORPHINE SULFATE 50 MG/1
1 CAPSULE, EXTENDED RELEASE ORAL ONCE
Refills: 0 | Status: DISCONTINUED | OUTPATIENT
Start: 2019-11-05 | End: 2019-11-05

## 2019-11-05 RX ORDER — INSULIN LISPRO 100/ML
VIAL (ML) SUBCUTANEOUS AT BEDTIME
Refills: 0 | Status: DISCONTINUED | OUTPATIENT
Start: 2019-11-05 | End: 2019-11-07

## 2019-11-05 RX ORDER — ONDANSETRON 8 MG/1
4 TABLET, FILM COATED ORAL ONCE
Refills: 0 | Status: COMPLETED | OUTPATIENT
Start: 2019-11-05 | End: 2019-11-05

## 2019-11-05 RX ADMIN — ONDANSETRON 4 MILLIGRAM(S): 8 TABLET, FILM COATED ORAL at 22:42

## 2019-11-05 RX ADMIN — INSULIN GLARGINE 11 UNIT(S): 100 INJECTION, SOLUTION SUBCUTANEOUS at 21:49

## 2019-11-05 RX ADMIN — SIMVASTATIN 40 MILLIGRAM(S): 20 TABLET, FILM COATED ORAL at 21:42

## 2019-11-05 RX ADMIN — Medication 7 UNIT(S): at 18:03

## 2019-11-05 RX ADMIN — PIPERACILLIN AND TAZOBACTAM 25 GRAM(S): 4; .5 INJECTION, POWDER, LYOPHILIZED, FOR SOLUTION INTRAVENOUS at 07:01

## 2019-11-05 RX ADMIN — MORPHINE SULFATE 1 MILLIGRAM(S): 50 CAPSULE, EXTENDED RELEASE ORAL at 12:50

## 2019-11-05 RX ADMIN — Medication 250 MILLIGRAM(S): at 07:01

## 2019-11-05 RX ADMIN — MORPHINE SULFATE 1 MILLIGRAM(S): 50 CAPSULE, EXTENDED RELEASE ORAL at 12:35

## 2019-11-05 RX ADMIN — ENOXAPARIN SODIUM 40 MILLIGRAM(S): 100 INJECTION SUBCUTANEOUS at 18:02

## 2019-11-05 RX ADMIN — LOSARTAN POTASSIUM 50 MILLIGRAM(S): 100 TABLET, FILM COATED ORAL at 18:02

## 2019-11-05 RX ADMIN — Medication 6: at 18:03

## 2019-11-05 RX ADMIN — Medication 8: at 12:33

## 2019-11-05 RX ADMIN — Medication 100 MILLIGRAM(S): at 18:01

## 2019-11-05 RX ADMIN — Medication 5 MILLIGRAM(S): at 21:42

## 2019-11-05 RX ADMIN — Medication 4: at 08:47

## 2019-11-05 RX ADMIN — SENNA PLUS 2 TABLET(S): 8.6 TABLET ORAL at 21:42

## 2019-11-05 RX ADMIN — Medication 100 MILLIGRAM(S): at 21:56

## 2019-11-05 RX ADMIN — Medication 12.5 MILLIGRAM(S): at 19:39

## 2019-11-05 RX ADMIN — CLOPIDOGREL BISULFATE 75 MILLIGRAM(S): 75 TABLET, FILM COATED ORAL at 12:34

## 2019-11-05 RX ADMIN — OXYCODONE AND ACETAMINOPHEN 2 TABLET(S): 5; 325 TABLET ORAL at 20:35

## 2019-11-05 RX ADMIN — OXYCODONE AND ACETAMINOPHEN 2 TABLET(S): 5; 325 TABLET ORAL at 21:35

## 2019-11-05 RX ADMIN — TAMSULOSIN HYDROCHLORIDE 0.4 MILLIGRAM(S): 0.4 CAPSULE ORAL at 21:42

## 2019-11-05 NOTE — PROGRESS NOTE ADULT - ASSESSMENT
pAD,   difficult to palpate pulse distal lower extremities      Podiatry follow up for wound care  needs LESLIE/pvr's  will follow

## 2019-11-05 NOTE — PROGRESS NOTE ADULT - PROBLEM SELECTOR PLAN 1
- p/w right 2nd Toeleg Cellulitis with erythema, swelling, distally purple  - warm to touch with some tenderness and cool at the tip   - s/p Vanco adn Zosyn in ED  - No Leukocytosis   - Afebrile   - c/w vanco and Zosyn   - Recommended right leg elevation to the patient  - lactate 1.7  - ESR 45   - CRP 4.07  - x-ray right foot : no osteomyelitis of toes   - c/w pain meds Tylenol and oxycodone PRN   - c/w senna and dulcolax  - f/u LESLIE   - f/u Blood Cx (Specimen received)  - f/u swab cx of wound  - f/u PT  - Podiatry consult noted  - Vacular Consult noted   ** ID consulted Dr. Corral - p/w right 2nd Toe and Cellulitis with erythema, swelling, distally purple  - warm to touch with some tenderness and cool at the tip   - s/p Vanco adn Zosyn in ED  - No Leukocytosis   - Afebrile   - c/w vanco and Zosyn   - Recommended right leg elevation to the patient  - lactate 1.7  - ESR 45   - CRP 4.07  - x-ray right foot : no osteomyelitis of toes   - c/w pain meds Tylenol and oxycodone PRN   - c/w senna and dulcolax  - f/u LESLIE   - f/u Blood Cx (Specimen received)  - f/u swab cx of wound  - f/u PT  - Podiatry consult noted  - Vacular Consult noted   ** ID consulted Dr. Corral

## 2019-11-05 NOTE — PROGRESS NOTE ADULT - SUBJECTIVE AND OBJECTIVE BOX
Clinically unchanged  Resid R 2nd toe discoloration  LESLIE/PVRs reviewed: R LE Mod dist arterial dz    A/P:   PAD/R toe blunt trauma/discoloration    Rec:   Cont local care and podiatry fu  Will plan angiogram +/- intervention (in OR thursday if time avail)

## 2019-11-05 NOTE — CONSULT NOTE ADULT - SUBJECTIVE AND OBJECTIVE BOX
79 year old Turkmen speaking male from home with PMH of type 2 DM, CAD s/p CABG, and HTN presented with complaint of right toe pain. Endocrinology was consulted for management of uncontrolled type 2 DM with hyperglycemia, A1c 10.9%.      line was used for communication with patient # [id number].     The patient reports a longstanding history of type 2 DM, diagnosed 13-14 years ago following open heart surgery. He can not recall home DM regimen but states he is on multiple oral DM medications. He does not use insulin. As per chart review, DM medications include Jardiance, Actos, Glipizide-Metformin, and linagliptin. He reports that he often skips these medications if he has an upset stomach. He also reports a diet high in carbohydrates. He eats a lot of fruit and currently has a container of cantaloupe at bedside. The patient obtained a right second toe injury associated with pain and darkening of the skin following a mechanical fall on Oct 29th. He reports worsening pain and difficulty with ambulation despite prescription of gabapentin and oxycodone obtained by PMD. He also reports associated decrease in appetite.  BG has remained uncontrolled since admission >300 despite Lantus 5 units at bedtime and low dose insulin sliding scale. Appetite has improved during the hospital stay. He denies family history of type 2 DM.    ALLERGIES  NKDA    PAST MEDICAL & SURGICAL HISTORY:  Diabetes  History of open heart surgery      SOCIAL HISTORY  Alcohol: denies   Tobacco: denies   Illicit substance use: denies       FAMILY HISTORY:  Denies family history of type 2 DM    MEDICATIONS  (STANDING):  bisacodyl 5 milliGRAM(s) Oral at bedtime  clopidogrel Tablet 75 milliGRAM(s) Oral daily  enoxaparin Injectable 40 milliGRAM(s) SubCutaneous daily  influenza   Vaccine 0.5 milliLiter(s) IntraMuscular once  insulin glargine Injectable (LANTUS) 11 Unit(s) SubCutaneous at bedtime  insulin lispro (HumaLOG) corrective regimen sliding scale   SubCutaneous three times a day before meals  insulin lispro (HumaLOG) corrective regimen sliding scale   SubCutaneous at bedtime  insulin lispro Injectable (HumaLOG) 5 Unit(s) SubCutaneous three times a day before meals  losartan 50 milliGRAM(s) Oral daily  metoprolol tartrate 12.5 milliGRAM(s) Oral two times a day  piperacillin/tazobactam IVPB.. 3.375 Gram(s) IV Intermittent every 8 hours  senna 2 Tablet(s) Oral at bedtime  simvastatin 40 milliGRAM(s) Oral at bedtime  sodium chloride 0.9%. 1000 milliLiter(s) (70 mL/Hr) IV Continuous <Continuous>  tamsulosin 0.4 milliGRAM(s) Oral at bedtime  vancomycin  IVPB 1000 milliGRAM(s) IV Intermittent every 12 hours    MEDICATIONS  (PRN):  acetaminophen   Tablet .. 650 milliGRAM(s) Oral every 6 hours PRN Mild Pain (1 - 3)  morphine  - Injectable 1 milliGRAM(s) IV Push once PRN Severe Pain (7 - 10)  oxycodone    5 mG/acetaminophen 325 mG 1 Tablet(s) Oral every 6 hours PRN Moderate Pain (4 - 6)  oxycodone    5 mG/acetaminophen 325 mG 2 Tablet(s) Oral every 6 hours PRN Severe Pain (7 - 10)      REVIEW OF SYSTEMS:  CONSTITUTIONAL: No fever, weight loss, or fatigue  EYES: No eye pain, visual disturbances, or discharge  ENMT:  No difficulty hearing, tinnitus, vertigo; No sinus or throat pain  NECK: No pain or stiffness  RESPIRATORY: No cough, wheezing, chills or hemoptysis; No shortness of breath  CARDIOVASCULAR: No chest pain, palpitations, dizziness, or leg swelling  GASTROINTESTINAL: No abdominal or epigastric pain. No nausea, vomiting, or hematemesis; No diarrhea or constipation. No melena or hematochezia.  GENITOURINARY: No dysuria, frequency, hematuria, or incontinence  NEUROLOGICAL: No headaches, memory loss, loss of strength, numbness, or tremors  SKIN: right second toe wound with pain and darkening of the skin, No itching, burning, or rashes  LYMPH NODES: No enlarged glands  ENDOCRINE: No heat or cold intolerance; No hair loss  MUSCULOSKELETAL: No joint pain or swelling; No muscle, back, or extremity pain  PSYCHIATRIC: No depression, anxiety, mood swings, or difficulty sleeping  HEME/LYMPH: No easy bruising, or bleeding gums  ALLERGY AND IMMUNOLOGIC: No hives or eczema        PHYSICAL EXAM:    VITAL SIGNS  T(C): 36.8 (11-05-19 @ 07:36), Max: 36.8 (11-05-19 @ 07:36)  HR: 64 (11-05-19 @ 07:36) (64 - 75)  BP: 117/58 (11-05-19 @ 07:36) (109/62 - 134/74)  RR: 17 (11-05-19 @ 07:36) (16 - 17)  SpO2: 99% (11-05-19 @ 07:36) (95% - 99%)    GENERAL: NAD, well-groomed, well-developed  HEAD:  Atraumatic, Normocephalic  EYES: EOMI, PERRLA, conjunctiva and sclera clear  ENMT: No tonsillar erythema, exudates, or enlargement; Moist mucous membranes, No lesions  NECK: Supple, No JVD, Normal thyroid  NERVOUS SYSTEM:  Alert & Oriented X3, Good concentration; Motor Strength 5/5 B/L upper and lower extremities; DTRs 2+ intact and symmetric  CHEST/LUNG: Clear to ausculation bilaterally; No rales, rhonchi, wheezing, or rubs  HEART: Regular rate and rhythm; No murmurs, rubs, or gallops  ABDOMEN: Soft, Nontender, Nondistended; Bowel sounds present  EXTREMITIES:  2+ Peripheral Pulses, No clubbing, cyanosis, or edema  LYMPH: No lymphadenopathy noted  SKIN: right second toe wound with darkening of surrounding skin, dressing in place  FEET: intact monofilament sensation of left foot     LABS:                        12.9   9.96  )-----------( 291      ( 05 Nov 2019 07:04 )             37.5     11-05    135  |  99  |  16  ----------------------------<  253<H>  3.8   |  29  |  0.85    Ca    8.7      05 Nov 2019 07:04  Phos  3.1     11-05  Mg     2.3     11-05    TPro  7.0  /  Alb  2.9<L>  /  TBili  1.0  /  DBili  x   /  AST  24  /  ALT  21  /  AlkPhos  83  11-05    PT/INR - ( 04 Nov 2019 06:14 )   PT: 13.6 sec;   INR: 1.22 ratio         PTT - ( 04 Nov 2019 06:14 )  PTT:31.7 sec    CAPILLARY BLOOD GLUCOSE      POCT Blood Glucose.: 316 mg/dL (05 Nov 2019 12:07)  POCT Blood Glucose.: 302 mg/dL (05 Nov 2019 08:21)  POCT Blood Glucose.: 358 mg/dL (04 Nov 2019 22:48)  POCT Blood Glucose.: 363 mg/dL (04 Nov 2019 21:09)  POCT Blood Glucose.: 243 mg/dL (04 Nov 2019 17:19)    A1c 10.9%

## 2019-11-05 NOTE — CHART NOTE - NSCHARTNOTEFT_GEN_A_CORE
Paged by RN that pt's BS was 78, but Lantus 11U was already given. We monitor BS. Paged by RN that pt's BS was 78, but Lantus 11U was already given. We monitor BS.   Also pt has n/v, and Zofran IV 4mg was given.

## 2019-11-05 NOTE — CONSULT NOTE ADULT - ASSESSMENT
79 year old Romansh speaking male from home with PMH of type 2 DM, CAD s/p CABG, and HTN presented with complaint of right toe pain. Endocrinology was consulted for management of uncontrolled type 2 DM with hyperglycemia, A1c 10.9%.     1. Uncontrolled type 2 DM with hyperglycemia   -A1c 10.9% likely secondary to dietary indiscretion and medication noncompliance  -primary team to verify patient's home medications with pharmacy  -Agree to increase to Lantus 11 units at bedtime  -start Humalog 7 units TID with meals  -recommend to Increase to moderate dose mealtime rapid acting insulin as below  BG 70-100mg/dL 0 units  -150 mg/dL 2 units  -200 mg/dL 4 units  -250 mg/dL 6 units  -300 mg/dL 8 units  -350 mg/dL 10 units  -400 mg/dL 12 units  BG > 400mg/dL 14 units  -FS AC HS  -ensure consistent carbohydrate diet   -will monitor FS and adjust accordingly   --if patient is NPO for any reason please change FS and sliding scale to NPO lispro scale     2. Right second toe wound  -wound care as per podiatry  -follow up LESLIE  -optimize glycemic control    3. HTN  -BP at goal  -continue losartan and metoprolol    Plan discussed with primary team  Thank you for allowing me to participate in the care of this patient  Please  call  w/ any questions or concerns [phone] 79 year old Croatian speaking male from home with PMH of type 2 DM, CAD s/p CABG, and HTN presented with complaint of right toe pain. Endocrinology was consulted for management of uncontrolled type 2 DM with hyperglycemia, A1c 10.9%.     1. Uncontrolled type 2 DM with hyperglycemia   -A1c 10.9% likely secondary to dietary indiscretion and medication noncompliance  -primary team to verify patient's home medications with pharmacy  -Agree to increase to Lantus 11 units at bedtime  -start Humalog 7 units TID with meals  -recommend to Increase to moderate dose mealtime rapid acting insulin as below  BG 70-100mg/dL 0 units  -150 mg/dL 0 units  -200 mg/dL 2 units  -250 mg/dL 4 units  -300 mg/dL 6 units  -350 mg/dL 8 units  -400 mg/dL 10 units  BG > 400mg/dL 12 units  -FS AC HS  -ensure consistent carbohydrate diet   -will monitor FS and adjust accordingly   --if patient is NPO for any reason please change FS and sliding scale to NPO lispro scale     2. Right second toe wound  -wound care as per podiatry  -follow up LESLIE  -optimize glycemic control    3. HTN  -BP at goal  -continue losartan and metoprolol    Plan discussed with primary team  Thank you for allowing me to participate in the care of this patient  Please  call  w/ any questions or concerns [phone]

## 2019-11-05 NOTE — PROGRESS NOTE ADULT - PROBLEM SELECTOR PLAN 2
- Pt does not remember his meds but he does not take insulin   - will start sliding scale and Lantus 5 units at bedtime   - Monitor blood sugars AC/HS and adjust as needed  - goal -180.   - f/u HgA1c  - Carbohydrate consistent diabetic diet with evening snacks. - Pt does not remember his meds but he does not take insulin   - will start sliding scale and Lantus 5 units at bedtime   - Monitor blood sugars AC/HS and adjust as needed  - goal -180.   - HgA1c 10.9  - Carbohydrate consistent diabetic diet with evening snacks. - Pt does not remember his meds but he does not take insulin   - will start sliding scale and Lantus 5 units at bedtime   - Monitor blood sugars AC/HS and adjust as needed  - goal -180.   - HgA1c 10.9  - Carbohydrate consistent diabetic diet with evening snacks.  -Endocrine Consult

## 2019-11-05 NOTE — PROGRESS NOTE ADULT - SUBJECTIVE AND OBJECTIVE BOX
Chief Complaint : Patient is a 79y old  Male who presents with a chief complaint of 2nd right toe pain (04 Nov 2019 08:38)    Pt was seen bedside this AM resting. Pt states he had pain yesterday but not much at all today. Pt states his toe only hurts when touched.       Patient admits to  (-) Fevers, (-) Chills, (-) Nausea, (-) Vomiting, (-) Shortness of Breath      PMH:   PSH:    Allergies:No Known Allergies      Labs:                          13.7   9.63  )-----------( 284      ( 04 Nov 2019 06:14 )             39.1     WBC Trend  WBC Count: 9.96 K/uL (11.05.19 @ 07:04)  9.63 Date (11-04 @ 06:14)      Chem  11-04    134<L>  |  101  |  20<H>  ----------------------------<  394<H>  4.2   |  26  |  1.04    Ca    9.1      04 Nov 2019 06:14    TPro  7.8  /  Alb  3.2<L>  /  TBili  0.8  /  DBili  x   /  AST  9<L>  /  ALT  13  /  AlkPhos  94  11-04      Vital Signs Last 24 Hrs  T(C): 36.8 (05 Nov 2019 07:36), Max: 36.8 (05 Nov 2019 07:36)  T(F): 98.3 (05 Nov 2019 07:36), Max: 98.3 (05 Nov 2019 07:36)  HR: 64 (05 Nov 2019 07:36) (64 - 75)  BP: 117/58 (05 Nov 2019 07:36) (109/62 - 134/74)  BP(mean): --  RR: 17 (05 Nov 2019 07:36) (16 - 17)  SpO2: 99% (05 Nov 2019 07:36) (95% - 99%)      O:   General: Pleasant  male NAD & AOX3.    Integument:  Skin warm, dry and supple bilateral.    Derm: right second digit has distal hematoma -fluctuance, erythema dorsal foot and plantar second digit, 0.5x0.4x0.1cm wound plantar second digit with surrounding erythema, no drainage, -PTB, -malodor   Vascular: Dorsalis Pedis and Posterior Tibial pulses 2/4.  Capillary re-fill time less then 3 seconds digits 1-5 bilateral. TG warm to cool b/l, cool to the second digit   Neuro: Protective sensation diminished to the level of the digits bilateral.  MSK: Muscle strength 5/5 all major muscle groups bilateral.      A: distal hematoma right second digit   plantar ulcer right second digit       P:   Chart reviewed and Patient evaluated  Discussed diagnosis and treatment with patient  X-rays reviewed - negative for fracture   LESLIE cancelled due to good pulses  Pt wound is stable; nursing orders placed for wound care   Pt to heel WB right foot, full WB left foot   Discussed importance of daily foot examinations and proper shoe gear and to importance of lower Fasting Blood Glucose levels.   Discussed with Attending    Pt is stable from podiatry standpoint and can f/u as an outpatient at [address] [place] [address]  Please reconsult as needed

## 2019-11-05 NOTE — PROGRESS NOTE ADULT - PROBLEM SELECTOR PLAN 3
- Pt does not remember his meds   - will start Lisinopril  for now with parameters (given hx of DM)  - Monitor BP closely and adjust medications if clinically indicated.  - DASH diet. - Pt does not remember his meds   - will continue Lisinopril  for now with parameters (given hx of DM)  - Monitor BP closely and adjust medications if clinically indicated.  - DASH diet.

## 2019-11-05 NOTE — PROGRESS NOTE ADULT - ASSESSMENT
79 year old Kinyarw[place] speaking male from home lives alone ambulates with cane at baseline with PMH of DM, HTN and PSH open heart surgery presents with toe pain. Reports R 2nd toe pain and wound since tripping and falling on it at [employer] on october 29th while wearing slippers . Saw PMD Dr. David Domínguez who prescribed gabapentin and oxycodone, noticed a cut on a toe, applied tar ointment and controlled pain with medications. pt states no imaging at that time. pt states 2/2 the pain has been having difficulty getting around and has not been eating, and feels that he needs to stay in the hospital pt reported excruciating pain with increased numbness and black discoloration on toe with severe pain intermittent cold sensation of feet.  No h/o foot wounds, no prior vascular testing, not on AC. pt denies fever or chills, CP or SOB. nausea , vomiting , diarrhea , exposure to dirty water or mosquitoes bites.     Pt is FULL CODE.  pt is admitted for management of Cellulitis

## 2019-11-05 NOTE — CONSULT NOTE ADULT - SUBJECTIVE AND OBJECTIVE BOX
HPI:  79 year old French speaking male from home lives alone ambulates with cane at baseline with PMH of DM, HTN and PSH open heart surgery presents with toe pain. Reports R 2nd toe pain and wound since tripping and falling on it at [employer] on october 29th while wearing slippers . Saw PMD Dr. David Domínguez who prescribed gabapentin and oxycodone, noticed a cut on a toe, applied tar ointment and controlled pain with medications. pt states no imaging at that time. pt states 2/2 the pain has been having difficulty getting around and has not been eating, and feels that he needs to stay in the hospital pt reported excruciating pain with increased numbness and black discoloration on toe with severe pain intermittent cold sensation of feet.  No h/o foot wounds, no prior vascular testing, not on AC. pt denies fever or chills, CP or SOB. nausea , vomiting , diarrhea , exposure to dirty water or mosquitoes bites.     Pt is FULL CODE. (04 Nov 2019 08:38)    History as above. 79 y M French speaking with DM( on meds ,non compliant), HTn admitted with right 2nd Toe cellulitis. ID consulted for the same. History obtained via  ID [id number]. Pt denies any fever, nausea, SOB, CP , abd pain, diarrhea or constipation, foot ulcers. walks independently. Complains of cold feet. Pt was tachycardic on admission but afebrile , no white cell count. Pt has been afebrile since admission.      PAST MEDICAL & SURGICAL HISTORY:  Diabetes  History of open heart surgery      No Known Allergies      Meds:  acetaminophen   Tablet .. 650 milliGRAM(s) Oral every 6 hours PRN  bisacodyl 5 milliGRAM(s) Oral at bedtime  clopidogrel Tablet 75 milliGRAM(s) Oral daily  enoxaparin Injectable 40 milliGRAM(s) SubCutaneous daily  influenza   Vaccine 0.5 milliLiter(s) IntraMuscular once  insulin glargine Injectable (LANTUS) 11 Unit(s) SubCutaneous at bedtime  insulin lispro (HumaLOG) corrective regimen sliding scale   SubCutaneous three times a day before meals  insulin lispro (HumaLOG) corrective regimen sliding scale   SubCutaneous at bedtime  insulin lispro Injectable (HumaLOG) 5 Unit(s) SubCutaneous three times a day before meals  losartan 50 milliGRAM(s) Oral daily  metoprolol tartrate 12.5 milliGRAM(s) Oral two times a day  morphine  - Injectable 1 milliGRAM(s) IV Push once PRN  oxycodone    5 mG/acetaminophen 325 mG 1 Tablet(s) Oral every 6 hours PRN  oxycodone    5 mG/acetaminophen 325 mG 2 Tablet(s) Oral every 6 hours PRN  piperacillin/tazobactam IVPB.. 3.375 Gram(s) IV Intermittent every 8 hours  senna 2 Tablet(s) Oral at bedtime  simvastatin 40 milliGRAM(s) Oral at bedtime  sodium chloride 0.9%. 1000 milliLiter(s) IV Continuous <Continuous>  tamsulosin 0.4 milliGRAM(s) Oral at bedtime  vancomycin  IVPB 1000 milliGRAM(s) IV Intermittent every 12 hours      SOCIAL HISTORY:  Smoker:  NO         ETOH use:   NO      Ilicit Drug use:   NO  Assisted device use (Cane / Walker): no     FAMILY HISTORY: no family history of Diabetes       VITALS:  Vital Signs Last 24 Hrs  T(C): 36.8 (05 Nov 2019 07:36), Max: 36.8 (05 Nov 2019 07:36)  T(F): 98.3 (05 Nov 2019 07:36), Max: 98.3 (05 Nov 2019 07:36)  HR: 64 (05 Nov 2019 07:36) (64 - 75)  BP: 117/58 (05 Nov 2019 07:36) (109/62 - 134/74)  BP(mean): --  RR: 17 (05 Nov 2019 07:36) (16 - 17)  SpO2: 99% (05 Nov 2019 07:36) (95% - 99%)    LABS/DIAGNOSTIC TESTS:                          12.9   9.96  )-----------( 291      ( 05 Nov 2019 07:04 )             37.5     WBC Count: 9.96 K/uL (11-05 @ 07:04)  WBC Count: 9.63 K/uL (11-04 @ 06:14)      11-05    135  |  99  |  16  ----------------------------<  253<H>  3.8   |  29  |  0.85    Ca    8.7      05 Nov 2019 07:04  Phos  3.1     11-05  Mg     2.3     11-05    TPro  7.0  /  Alb  2.9<L>  /  TBili  1.0  /  DBili  x   /  AST  24  /  ALT  21  /  AlkPhos  83  11-05          LIVER FUNCTIONS - ( 05 Nov 2019 07:04 )  Alb: 2.9 g/dL / Pro: 7.0 g/dL / ALK PHOS: 83 U/L / ALT: 21 U/L DA / AST: 24 U/L / GGT: x             PT/INR - ( 04 Nov 2019 06:14 )   PT: 13.6 sec;   INR: 1.22 ratio         PTT - ( 04 Nov 2019 06:14 )  PTT:31.7 sec    LACTATE:    ABG -     CULTURES:   .Blood  11-04 @ 11:29   No growth to date.  --  --          RADIOLOGY:    < from: Xray Toes, Right Foot (11.04.19 @ 06:04) >  EXAM:  TOE(S) RIGHT FOOT                            PROCEDURE DATE:  11/04/2019          INTERPRETATION:  Right toes x-rays    Indication: Pain.    3 views of the right toes are acquired without a previous examination for   comparison.    Impression: No evidence for an acute fracture or dislocation.    The joint spaces are preserved.    The osseous mineralization is within normal limits.     < end of copied text >    < from: Xray Chest 1 View- PORTABLE-Urgent (11.04.19 @ 14:25) >  EXAM:  XR CHEST PORTABLE URGENT 1V                            PROCEDURE DATE:  11/04/2019          INTERPRETATION:  History: Follow-up fluid overload    Portable radiograph of the chest is performed. There are no prior studies   for comparison.  Sternal wires are appreciated. The heart is not enlarged. There is no   focal infiltrate or pleural effusion. There is no evidence of pulmonary   vascular congestion. There is osteopenia and degenerative change of the   bony structures with sutures seenoverlying the left shoulder.    Impression: No active pulmonary disease  Sternotomy wires    < end of copied text >    REVIEW OF SYSTEMS:  CONSTITUTIONAL: No weakness, fevers or chills  EYES/ENT: No visual changes;  No vertigo or throat pain   RESPIRATORY: No cough, wheezing, hemoptysis; No shortness of breath  CARDIOVASCULAR: No chest pain or palpitations  GASTROINTESTINAL: No abdominal  pain. No nausea, vomiting. No diarrhea or constipation. No melena or hematochezia.  GENITOURINARY: No dysuria, frequency or hematuria  NEUROLOGICAL: No numbness or weakness.  EXT: pain in the Right 2nd toe with blackening , cold feet   SKIN: No itching, rashes HPI:  79 year old Kazakh speaking male from home lives alone ambulates with cane at baseline with PMH of DM, HTN and PSH open heart surgery presents with toe pain. Reports R 2nd toe pain and wound since tripping and falling on it at [employer] on october 29th while wearing slippers . Saw PMD Dr. David Domínguez who prescribed gabapentin and oxycodone, noticed a cut on a toe, applied tar ointment and controlled pain with medications. pt states no imaging at that time. pt states 2/2 the pain has been having difficulty getting around and has not been eating, and feels that he needs to stay in the hospital pt reported excruciating pain with increased numbness and black discoloration on toe with severe pain intermittent cold sensation of feet.  No h/o foot wounds, no prior vascular testing, not on AC. pt denies fever or chills, CP or SOB. nausea , vomiting , diarrhea , exposure to dirty water or mosquitoes bites.     Pt is FULL CODE. (04 Nov 2019 08:38)    History as above. 79 y M Kazakh speaking with DM( on meds ,non compliant), HTn admitted with right 2nd Toe cellulitis. ID consulted for the same. History obtained via  ID [id number]. Pt denies any fever, nausea, SOB, CP , abd pain, diarrhea or constipation, foot ulcers. walks independently. Complains of cold feet. Pt was tachycardic on admission but afebrile , no white cell count. Pt has been afebrile since admission.      PAST MEDICAL & SURGICAL HISTORY:  Diabetes  History of open heart surgery      No Known Allergies      Meds:  acetaminophen   Tablet .. 650 milliGRAM(s) Oral every 6 hours PRN  bisacodyl 5 milliGRAM(s) Oral at bedtime  clopidogrel Tablet 75 milliGRAM(s) Oral daily  enoxaparin Injectable 40 milliGRAM(s) SubCutaneous daily  influenza   Vaccine 0.5 milliLiter(s) IntraMuscular once  insulin glargine Injectable (LANTUS) 11 Unit(s) SubCutaneous at bedtime  insulin lispro (HumaLOG) corrective regimen sliding scale   SubCutaneous three times a day before meals  insulin lispro (HumaLOG) corrective regimen sliding scale   SubCutaneous at bedtime  insulin lispro Injectable (HumaLOG) 5 Unit(s) SubCutaneous three times a day before meals  losartan 50 milliGRAM(s) Oral daily  metoprolol tartrate 12.5 milliGRAM(s) Oral two times a day  morphine  - Injectable 1 milliGRAM(s) IV Push once PRN  oxycodone    5 mG/acetaminophen 325 mG 1 Tablet(s) Oral every 6 hours PRN  oxycodone    5 mG/acetaminophen 325 mG 2 Tablet(s) Oral every 6 hours PRN  piperacillin/tazobactam IVPB.. 3.375 Gram(s) IV Intermittent every 8 hours  senna 2 Tablet(s) Oral at bedtime  simvastatin 40 milliGRAM(s) Oral at bedtime  sodium chloride 0.9%. 1000 milliLiter(s) IV Continuous <Continuous>  tamsulosin 0.4 milliGRAM(s) Oral at bedtime  vancomycin  IVPB 1000 milliGRAM(s) IV Intermittent every 12 hours      SOCIAL HISTORY:  Smoker:  NO         ETOH use:   NO      Ilicit Drug use:   NO  Assisted device use (Cane / Walker): no     FAMILY HISTORY: no family history of Diabetes       VITALS:  Vital Signs Last 24 Hrs  T(C): 36.8 (05 Nov 2019 07:36), Max: 36.8 (05 Nov 2019 07:36)  T(F): 98.3 (05 Nov 2019 07:36), Max: 98.3 (05 Nov 2019 07:36)  HR: 64 (05 Nov 2019 07:36) (64 - 75)  BP: 117/58 (05 Nov 2019 07:36) (109/62 - 134/74)  BP(mean): --  RR: 17 (05 Nov 2019 07:36) (16 - 17)  SpO2: 99% (05 Nov 2019 07:36) (95% - 99%)    LABS/DIAGNOSTIC TESTS:                          12.9   9.96  )-----------( 291      ( 05 Nov 2019 07:04 )             37.5     WBC Count: 9.96 K/uL (11-05 @ 07:04)  WBC Count: 9.63 K/uL (11-04 @ 06:14)      11-05    135  |  99  |  16  ----------------------------<  253<H>  3.8   |  29  |  0.85    Ca    8.7      05 Nov 2019 07:04  Phos  3.1     11-05  Mg     2.3     11-05    TPro  7.0  /  Alb  2.9<L>  /  TBili  1.0  /  DBili  x   /  AST  24  /  ALT  21  /  AlkPhos  83  11-05          LIVER FUNCTIONS - ( 05 Nov 2019 07:04 )  Alb: 2.9 g/dL / Pro: 7.0 g/dL / ALK PHOS: 83 U/L / ALT: 21 U/L DA / AST: 24 U/L / GGT: x             PT/INR - ( 04 Nov 2019 06:14 )   PT: 13.6 sec;   INR: 1.22 ratio         PTT - ( 04 Nov 2019 06:14 )  PTT:31.7 sec    LACTATE:    ABG -     CULTURES:   .Blood  11-04 @ 11:29   No growth to date.  --  --          RADIOLOGY:    < from: Xray Toes, Right Foot (11.04.19 @ 06:04) >  EXAM:  TOE(S) RIGHT FOOT                            PROCEDURE DATE:  11/04/2019          INTERPRETATION:  Right toes x-rays    Indication: Pain.    3 views of the right toes are acquired without a previous examination for   comparison.    Impression: No evidence for an acute fracture or dislocation.    The joint spaces are preserved.    The osseous mineralization is within normal limits.     < end of copied text >    < from: Xray Chest 1 View- PORTABLE-Urgent (11.04.19 @ 14:25) >  EXAM:  XR CHEST PORTABLE URGENT 1V                            PROCEDURE DATE:  11/04/2019          INTERPRETATION:  History: Follow-up fluid overload    Portable radiograph of the chest is performed. There are no prior studies   for comparison.  Sternal wires are appreciated. The heart is not enlarged. There is no   focal infiltrate or pleural effusion. There is no evidence of pulmonary   vascular congestion. There is osteopenia and degenerative change of the   bony structures with sutures seenoverlying the left shoulder.    Impression: No active pulmonary disease  Sternotomy wires    < end of copied text >    REVIEW OF SYSTEMS:  CONSTITUTIONAL: No weakness, fevers or chills  EYES/ENT: No visual changes;  No vertigo or throat pain   RESPIRATORY: No cough, wheezing, hemoptysis; No shortness of breath  CARDIOVASCULAR: No chest pain or palpitations  GASTROINTESTINAL: No abdominal  pain. No nausea, vomiting. No diarrhea or constipation. No melena or hematochezia.  GENITOURINARY: No dysuria, frequency or hematuria  NEUROLOGICAL: No numbness or weakness.  EXT: pain in the Right 2nd toe with blackening , cold feet on left   SKIN: No itching

## 2019-11-05 NOTE — PROGRESS NOTE ADULT - ATTENDING COMMENTS
Patient seen/evaluated at bedside 11/5/19. I agree with the resident progress note/outlined plan of care. My independent findings and conclusions are documented.    Green[place]ic  [id number]  Pt reports persistent pain at R second digit though overall improved from yesterday. He admits to non compliance w/ oral hypoglycemic agents because he felt well. He reports he has "many pills at home"    PE  AAOx3, non toxic appearing/NAD  right foot: slightly receding erythema at right foot extending to right second toe + tender to palpation  R 2nd toe tip necrosis w/ edema, w/ 0.6 cm plantar surface ulcer    ABIs: Right LESLIE compatible with moderate peripheral vascular disease. Segmental   disease within the right calf. Normal left LESLIE.    1. right toe/foot cellulitis  2. 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 DM T II w/ severe hyperglycemia (hgbA1c>10)  4. HTN  5. PAD    de-escalate abx to kefzol per ID  continue to monitor for improvement  local wound care, pain control  likely discharge in 48 hours pending continued improvement in cellulitis and glycemic control  appreciate discussion w/ endocrinology  lantus 11 units, lispro 7 unit tid  c/w plavix/ arb/beta blocker

## 2019-11-05 NOTE — PROGRESS NOTE ADULT - SUBJECTIVE AND OBJECTIVE BOX
Pt s- new complaints  ICU Vital Signs Last 24 Hrs  T(C): 36.8 (05 Nov 2019 07:36), Max: 36.8 (05 Nov 2019 07:36)  T(F): 98.3 (05 Nov 2019 07:36), Max: 98.3 (05 Nov 2019 07:36)  HR: 64 (05 Nov 2019 07:36) (64 - 75)  BP: 117/58 (05 Nov 2019 07:36) (109/62 - 139/71)  BP(mean): --  ABP: --  ABP(mean): --  RR: 17 (05 Nov 2019 07:36) (16 - 17)  SpO2: 99% (05 Nov 2019 07:36) (95% - 99%)    Right leg/foot: warm,   Toe hematoma unchanged,                           12.9   9.96  )-----------( 291      ( 05 Nov 2019 07:04 )             37.5

## 2019-11-05 NOTE — CONSULT NOTE ADULT - ASSESSMENT
Cellulitis of right 2nd toe and foot and leg  Mild skin necrosis on plantar aspect of 2nd toe    Plan - DC Zosyn  Start Kefzol 1 gm iv q8hrs

## 2019-11-05 NOTE — CONSULT NOTE ADULT - GASTROINTESTINAL DETAILS
no guarding/no rebound tenderness/soft/no masses palpable/bowel sounds normal/no distention/nontender bowel sounds normal/no rebound tenderness/soft/no guarding/no organomegaly/no masses palpable/no rigidity/no distention/nontender

## 2019-11-05 NOTE — CONSULT NOTE ADULT - EXTREMITIES COMMENTS
R 2nd digit: digit hematoma . erythema and tenderness R 2nd digit extending to Dorsal foot small wound on plantar second digit with no drainage

## 2019-11-05 NOTE — PROGRESS NOTE ADULT - SUBJECTIVE AND OBJECTIVE BOX
YEOM, CHANG  79y  Male      Patient is a 79y old  Male who presents with a chief complaint of 2nd right toe pain (05 Nov 2019 08:08)      INTERVAL HPI/OVERNIGHT EVENTS:  Patient is upset over the number of times his blood has been drawn since being admitted.  With the  on the phone, we discussed the importance of blood sugar testing before every meal, and the fact his blood will be drawn for labs in the AM.  Patient states he now understands.  Patient states his right 2nd toe is very painful.      REVIEW OF SYSTEMS:  CONSTITUTIONAL: No fever, weight loss, or fatigue  EYES: No eye pain, visual disturbances, or discharge  ENMT:  No difficulty hearing, tinnitus, vertigo  NECK: No pain or stiffness  RESPIRATORY: No cough, wheezing, chills or hemoptysis; No shortness of breath  CARDIOVASCULAR: No chest pain, dizziness, or leg swelling  GASTROINTESTINAL: Not eating frequently at home, no abdominal pain or discomfort  GENITOURINARY: No dysuria, frequency, or incontinence  NEUROLOGICAL: No headaches, numbness, or tremors  SKIN: Right 2nd toe swelling, hemorrhagic blister and erythema in dorsal right foot  LYMPH NODES: No enlarged glands  ENDOCRINE: No heat or cold intolerance; No hair loss  MUSCULOSKELETAL: Pain in right foot  PSYCHIATRIC: normal affect and behavior  HEME/LYMPH: No easy bruising, or bleeding gums  ALLERY AND IMMUNOLOGIC: No hives or eczema  FAMILY HISTORY:    T(C): 36.8 (11-05-19 @ 07:36), Max: 36.8 (11-05-19 @ 07:36)  HR: 64 (11-05-19 @ 07:36) (64 - 75)  BP: 117/58 (11-05-19 @ 07:36) (109/62 - 139/71)  RR: 17 (11-05-19 @ 07:36) (16 - 17)  SpO2: 99% (11-05-19 @ 07:36) (95% - 99%)  Wt(kg): --Vital Signs Last 24 Hrs  T(C): 36.8 (05 Nov 2019 07:36), Max: 36.8 (05 Nov 2019 07:36)  T(F): 98.3 (05 Nov 2019 07:36), Max: 98.3 (05 Nov 2019 07:36)  HR: 64 (05 Nov 2019 07:36) (64 - 75)  BP: 117/58 (05 Nov 2019 07:36) (109/62 - 139/71)  BP(mean): --  RR: 17 (05 Nov 2019 07:36) (16 - 17)  SpO2: 99% (05 Nov 2019 07:36) (95% - 99%)  No Known Allergies      PHYSICAL EXAM:  GENERAL: NAD, well-groomed, well-developed  HEAD:  normocephalic;   EYES: EOMI, PERRLA, conjunctiva and sclera clear  ENMT: mouth moist  NECK: Supple, No JVD, Normal thyroid  NERVOUS SYSTEM:  Alert & Oriented X3, Good concentration; Motor Strength 5/5 B/L upper and lower extremities; DTRs 2+ intact and symmetric  CHEST/LUNG: Clear to percussion bilaterally; No rales, rhonchi, wheezing, or rubs  HEART: Regular rate and rhythm; No murmurs, rubs, or gallops  ABDOMEN: Soft, Nontender, Nondistended; Bowel sounds present  EXTREMITIES:  no calf tenderness, no cyanosis, no edema, RLE 2nd digit with violaceous discoloration with surrounding erythema extending to dorsal foot  LYMPH: No lymphadenopathy noted  SKIN: No rashes or lesions    Consultant(s) Notes Reviewed:  [x ] YES  [ ] NO  Care Discussed with Consultants/Other Providers [ x] YES  [ ] NO    LABS:                        12.9   9.96  )-----------( 291      ( 05 Nov 2019 07:04 )             37.5   11-05    135  |  99  |  16  ----------------------------<  253<H>  3.8   |  29  |  0.85    Ca    8.7      05 Nov 2019 07:04  Phos  3.1     11-05  Mg     2.3     11-05    TPro  7.0  /  Alb  2.9<L>  /  TBili  1.0  /  DBili  x   /  AST  24  /  ALT  21  /  AlkPhos  83  11-05    WBC Count: 9.96 K/uL (11-05 @ 07:04)  WBC Count: 9.63 K/uL (11-04 @ 06:14)    RADIOLOGY & ADDITIONAL TESTS:    Imaging Personally Reviewed:  [ ] YES  [ ] NO  acetaminophen   Tablet .. 650 milliGRAM(s) Oral every 6 hours PRN  bisacodyl 5 milliGRAM(s) Oral at bedtime  enoxaparin Injectable 40 milliGRAM(s) SubCutaneous daily  influenza   Vaccine 0.5 milliLiter(s) IntraMuscular once  insulin glargine Injectable (LANTUS) 5 Unit(s) SubCutaneous at bedtime  insulin lispro (HumaLOG) corrective regimen sliding scale   SubCutaneous Before meals and at bedtime  lisinopril 2.5 milliGRAM(s) Oral daily  oxycodone    5 mG/acetaminophen 325 mG 1 Tablet(s) Oral every 6 hours PRN  oxycodone    5 mG/acetaminophen 325 mG 2 Tablet(s) Oral every 6 hours PRN  piperacillin/tazobactam IVPB.. 3.375 Gram(s) IV Intermittent every 8 hours  senna 2 Tablet(s) Oral at bedtime  sodium chloride 0.9%. 1000 milliLiter(s) IV Continuous <Continuous>  vancomycin  IVPB 1000 milliGRAM(s) IV Intermittent every 12 hours      HEALTH ISSUES - PROBLEM Dx:  Goals of care, counseling/discussion: Goals of care, counseling/discussion  Prophylactic measure: Prophylactic measure  HTN (hypertension): HTN (hypertension)  Diabetes: Diabetes  Cellulitis of toe of right foot: Cellulitis of toe of right foot YEOM, CHANG  79y  Male      Patient is a 79y old  Male who presents with a chief complaint of 2nd right toe pain (05 Nov 2019 08:08)      INTERVAL HPI/OVERNIGHT EVENTS:  Patient is upset over the number of times his blood has been drawn since being admitted.  With the  on the phone, we discussed the importance of blood sugar testing before every meal, and the fact his blood will be drawn for labs in the AM.  Patient states he now understands.  Patient states his right 2nd toe is very painful.      REVIEW OF SYSTEMS:  CONSTITUTIONAL: No fever, weight loss, or fatigue  EYES: No eye pain, visual disturbances, or discharge  ENMT:  No difficulty hearing, tinnitus, vertigo  NECK: No pain or stiffness  RESPIRATORY: No cough, wheezing, chills or hemoptysis; No shortness of breath  CARDIOVASCULAR: No chest pain, dizziness, or leg swelling  GASTROINTESTINAL: Not eating frequently at home, no abdominal pain or discomfort  GENITOURINARY: No dysuria, frequency, or incontinence  NEUROLOGICAL: No headaches, numbness, or tremors  SKIN: Right 2nd toe swelling, hemorrhagic blister and erythema in dorsal right foot  LYMPH NODES: No enlarged glands  ENDOCRINE: No heat or cold intolerance; No hair loss  MUSCULOSKELETAL: Pain in right foot  PSYCHIATRIC: normal affect and behavior  HEME/LYMPH: No easy bruising, or bleeding gums  ALLERY AND IMMUNOLOGIC: No hives or eczema  FAMILY HISTORY:    T(C): 36.8 (11-05-19 @ 07:36), Max: 36.8 (11-05-19 @ 07:36)  HR: 64 (11-05-19 @ 07:36) (64 - 75)  BP: 117/58 (11-05-19 @ 07:36) (109/62 - 139/71)  RR: 17 (11-05-19 @ 07:36) (16 - 17)  SpO2: 99% (11-05-19 @ 07:36) (95% - 99%)  Wt(kg): --Vital Signs Last 24 Hrs  T(C): 36.8 (05 Nov 2019 07:36), Max: 36.8 (05 Nov 2019 07:36)  T(F): 98.3 (05 Nov 2019 07:36), Max: 98.3 (05 Nov 2019 07:36)  HR: 64 (05 Nov 2019 07:36) (64 - 75)  BP: 117/58 (05 Nov 2019 07:36) (109/62 - 139/71)  BP(mean): --  RR: 17 (05 Nov 2019 07:36) (16 - 17)  SpO2: 99% (05 Nov 2019 07:36) (95% - 99%)  No Known Allergies      PHYSICAL EXAM:  GENERAL: NAD, well-groomed, well-developed  HEAD:  normocephalic;   EYES: EOMI, PERRLA, conjunctiva and sclera clear  ENMT: mouth moist  NECK: Supple, No JVD, Normal thyroid  NERVOUS SYSTEM:  Alert & Oriented X3, Good concentration; Motor Strength 5/5 B/L upper and lower extremities; DTRs 2+ intact and symmetric  CHEST/LUNG: Clear to percussion bilaterally; No rales, rhonchi, wheezing, or rubs  HEART: Regular rate and rhythm; No murmurs, rubs, or gallops  ABDOMEN: Soft, Nontender, Nondistended; Bowel sounds present  EXTREMITIES:  no calf tenderness, no cyanosis, no edema, RLE 2nd digit with violaceous discoloration with surrounding erythema extending to dorsal foot  LYMPH: No lymphadenopathy noted  SKIN: No rashes or lesions    Consultant(s) Notes Reviewed:  [x ] YES  [ ] NO  Care Discussed with Consultants/Other Providers [ x] YES  [ ] NO    LABS:                        12.9   9.96  )-----------( 291      ( 05 Nov 2019 07:04 )             37.5   11-05    135  |  99  |  16  ----------------------------<  253<H>  3.8   |  29  |  0.85    Ca    8.7      05 Nov 2019 07:04  Phos  3.1     11-05  Mg     2.3     11-05    TPro  7.0  /  Alb  2.9<L>  /  TBili  1.0  /  DBili  x   /  AST  24  /  ALT  21  /  AlkPhos  83  11-05    WBC Count: 9.96 K/uL (11-05 @ 07:04)  WBC Count: 9.63 K/uL (11-04 @ 06:14)    RADIOLOGY & ADDITIONAL TESTS:    Imaging Personally Reviewed:  [x ] YES  [ ] NO  acetaminophen   Tablet .. 650 milliGRAM(s) Oral every 6 hours PRN  bisacodyl 5 milliGRAM(s) Oral at bedtime  enoxaparin Injectable 40 milliGRAM(s) SubCutaneous daily  influenza   Vaccine 0.5 milliLiter(s) IntraMuscular once  insulin glargine Injectable (LANTUS) 5 Unit(s) SubCutaneous at bedtime  insulin lispro (HumaLOG) corrective regimen sliding scale   SubCutaneous Before meals and at bedtime  lisinopril 2.5 milliGRAM(s) Oral daily  oxycodone    5 mG/acetaminophen 325 mG 1 Tablet(s) Oral every 6 hours PRN  oxycodone    5 mG/acetaminophen 325 mG 2 Tablet(s) Oral every 6 hours PRN  piperacillin/tazobactam IVPB.. 3.375 Gram(s) IV Intermittent every 8 hours  senna 2 Tablet(s) Oral at bedtime  sodium chloride 0.9%. 1000 milliLiter(s) IV Continuous <Continuous>  vancomycin  IVPB 1000 milliGRAM(s) IV Intermittent every 12 hours      HEALTH ISSUES - PROBLEM Dx:  Goals of care, counseling/discussion: Goals of care, counseling/discussion  Prophylactic measure: Prophylactic measure  HTN (hypertension): HTN (hypertension)  Diabetes: Diabetes  Cellulitis of toe of right foot: Cellulitis of toe of right foot

## 2019-11-05 NOTE — CONSULT NOTE ADULT - RS GEN PE MLT RESP DETAILS PC
no rhonchi/no wheezes/no rales/clear to auscultation bilaterally/breath sounds equal no rhonchi/good air movement/no wheezes/breath sounds equal/clear to auscultation bilaterally/no rales

## 2019-11-06 ENCOUNTER — TRANSCRIPTION ENCOUNTER (OUTPATIENT)
Age: 79
End: 2019-11-06

## 2019-11-06 DIAGNOSIS — I73.9 PERIPHERAL VASCULAR DISEASE, UNSPECIFIED: ICD-10-CM

## 2019-11-06 DIAGNOSIS — E86.0 DEHYDRATION: ICD-10-CM

## 2019-11-06 DIAGNOSIS — R73.9 HYPERGLYCEMIA, UNSPECIFIED: ICD-10-CM

## 2019-11-06 LAB
ALBUMIN SERPL ELPH-MCNC: 2.7 G/DL — LOW (ref 3.5–5)
ALP SERPL-CCNC: 143 U/L — HIGH (ref 40–120)
ALT FLD-CCNC: 69 U/L DA — HIGH (ref 10–60)
ANION GAP SERPL CALC-SCNC: 6 MMOL/L — SIGNIFICANT CHANGE UP (ref 5–17)
AST SERPL-CCNC: 59 U/L — HIGH (ref 10–40)
BASOPHILS # BLD AUTO: 0.02 K/UL — SIGNIFICANT CHANGE UP (ref 0–0.2)
BASOPHILS NFR BLD AUTO: 0.2 % — SIGNIFICANT CHANGE UP (ref 0–2)
BILIRUB SERPL-MCNC: 0.5 MG/DL — SIGNIFICANT CHANGE UP (ref 0.2–1.2)
BUN SERPL-MCNC: 17 MG/DL — SIGNIFICANT CHANGE UP (ref 7–18)
CALCIUM SERPL-MCNC: 9 MG/DL — SIGNIFICANT CHANGE UP (ref 8.4–10.5)
CHLORIDE SERPL-SCNC: 101 MMOL/L — SIGNIFICANT CHANGE UP (ref 96–108)
CO2 SERPL-SCNC: 29 MMOL/L — SIGNIFICANT CHANGE UP (ref 22–31)
CREAT SERPL-MCNC: 0.83 MG/DL — SIGNIFICANT CHANGE UP (ref 0.5–1.3)
EOSINOPHIL # BLD AUTO: 0.04 K/UL — SIGNIFICANT CHANGE UP (ref 0–0.5)
EOSINOPHIL NFR BLD AUTO: 0.4 % — SIGNIFICANT CHANGE UP (ref 0–6)
GLUCOSE BLDC GLUCOMTR-MCNC: 123 MG/DL — HIGH (ref 70–99)
GLUCOSE BLDC GLUCOMTR-MCNC: 157 MG/DL — HIGH (ref 70–99)
GLUCOSE BLDC GLUCOMTR-MCNC: 168 MG/DL — HIGH (ref 70–99)
GLUCOSE BLDC GLUCOMTR-MCNC: 304 MG/DL — HIGH (ref 70–99)
GLUCOSE BLDC GLUCOMTR-MCNC: 312 MG/DL — HIGH (ref 70–99)
GLUCOSE SERPL-MCNC: 174 MG/DL — HIGH (ref 70–99)
HCT VFR BLD CALC: 36.4 % — LOW (ref 39–50)
HGB BLD-MCNC: 12.7 G/DL — LOW (ref 13–17)
IMM GRANULOCYTES NFR BLD AUTO: 0.3 % — SIGNIFICANT CHANGE UP (ref 0–1.5)
LYMPHOCYTES # BLD AUTO: 1.56 K/UL — SIGNIFICANT CHANGE UP (ref 1–3.3)
LYMPHOCYTES # BLD AUTO: 17.4 % — SIGNIFICANT CHANGE UP (ref 13–44)
MAGNESIUM SERPL-MCNC: 2.2 MG/DL — SIGNIFICANT CHANGE UP (ref 1.6–2.6)
MCHC RBC-ENTMCNC: 34 PG — SIGNIFICANT CHANGE UP (ref 27–34)
MCHC RBC-ENTMCNC: 34.9 GM/DL — SIGNIFICANT CHANGE UP (ref 32–36)
MCV RBC AUTO: 97.6 FL — SIGNIFICANT CHANGE UP (ref 80–100)
MONOCYTES # BLD AUTO: 0.67 K/UL — SIGNIFICANT CHANGE UP (ref 0–0.9)
MONOCYTES NFR BLD AUTO: 7.5 % — SIGNIFICANT CHANGE UP (ref 2–14)
NEUTROPHILS # BLD AUTO: 6.66 K/UL — SIGNIFICANT CHANGE UP (ref 1.8–7.4)
NEUTROPHILS NFR BLD AUTO: 74.2 % — SIGNIFICANT CHANGE UP (ref 43–77)
NRBC # BLD: 0 /100 WBCS — SIGNIFICANT CHANGE UP (ref 0–0)
PHOSPHATE SERPL-MCNC: 2.9 MG/DL — SIGNIFICANT CHANGE UP (ref 2.5–4.5)
PLATELET # BLD AUTO: 292 K/UL — SIGNIFICANT CHANGE UP (ref 150–400)
POTASSIUM SERPL-MCNC: 4.2 MMOL/L — SIGNIFICANT CHANGE UP (ref 3.5–5.3)
POTASSIUM SERPL-SCNC: 4.2 MMOL/L — SIGNIFICANT CHANGE UP (ref 3.5–5.3)
PROT SERPL-MCNC: 6.8 G/DL — SIGNIFICANT CHANGE UP (ref 6–8.3)
RBC # BLD: 3.73 M/UL — LOW (ref 4.2–5.8)
RBC # FLD: 11.9 % — SIGNIFICANT CHANGE UP (ref 10.3–14.5)
SODIUM SERPL-SCNC: 136 MMOL/L — SIGNIFICANT CHANGE UP (ref 135–145)
VANCOMYCIN TROUGH SERPL-MCNC: 3.3 UG/ML — LOW (ref 10–20)
WBC # BLD: 8.98 K/UL — SIGNIFICANT CHANGE UP (ref 3.8–10.5)
WBC # FLD AUTO: 8.98 K/UL — SIGNIFICANT CHANGE UP (ref 3.8–10.5)

## 2019-11-06 PROCEDURE — 99232 SBSQ HOSP IP/OBS MODERATE 35: CPT | Mod: GC

## 2019-11-06 PROCEDURE — 99232 SBSQ HOSP IP/OBS MODERATE 35: CPT

## 2019-11-06 RX ORDER — MORPHINE SULFATE 50 MG/1
1 CAPSULE, EXTENDED RELEASE ORAL ONCE
Refills: 0 | Status: DISCONTINUED | OUTPATIENT
Start: 2019-11-06 | End: 2019-11-06

## 2019-11-06 RX ORDER — CHLORHEXIDINE GLUCONATE 213 G/1000ML
1 SOLUTION TOPICAL ONCE
Refills: 0 | Status: DISCONTINUED | OUTPATIENT
Start: 2019-11-06 | End: 2019-11-07

## 2019-11-06 RX ORDER — INSULIN GLARGINE 100 [IU]/ML
5 INJECTION, SOLUTION SUBCUTANEOUS AT BEDTIME
Refills: 0 | Status: DISCONTINUED | OUTPATIENT
Start: 2019-11-06 | End: 2019-11-07

## 2019-11-06 RX ORDER — INSULIN LISPRO 100/ML
4 VIAL (ML) SUBCUTANEOUS
Refills: 0 | Status: DISCONTINUED | OUTPATIENT
Start: 2019-11-06 | End: 2019-11-07

## 2019-11-06 RX ORDER — LANOLIN ALCOHOL/MO/W.PET/CERES
1 CREAM (GRAM) TOPICAL AT BEDTIME
Refills: 0 | Status: DISCONTINUED | OUTPATIENT
Start: 2019-11-06 | End: 2019-11-07

## 2019-11-06 RX ADMIN — SIMVASTATIN 40 MILLIGRAM(S): 20 TABLET, FILM COATED ORAL at 21:57

## 2019-11-06 RX ADMIN — OXYCODONE AND ACETAMINOPHEN 1 TABLET(S): 5; 325 TABLET ORAL at 09:55

## 2019-11-06 RX ADMIN — OXYCODONE AND ACETAMINOPHEN 1 TABLET(S): 5; 325 TABLET ORAL at 21:57

## 2019-11-06 RX ADMIN — SENNA PLUS 2 TABLET(S): 8.6 TABLET ORAL at 21:57

## 2019-11-06 RX ADMIN — Medication 8: at 17:33

## 2019-11-06 RX ADMIN — ENOXAPARIN SODIUM 40 MILLIGRAM(S): 100 INJECTION SUBCUTANEOUS at 11:55

## 2019-11-06 RX ADMIN — Medication 100 MILLIGRAM(S): at 21:56

## 2019-11-06 RX ADMIN — Medication 4 UNIT(S): at 17:33

## 2019-11-06 RX ADMIN — Medication 100 MILLIGRAM(S): at 05:36

## 2019-11-06 RX ADMIN — OXYCODONE AND ACETAMINOPHEN 2 TABLET(S): 5; 325 TABLET ORAL at 18:11

## 2019-11-06 RX ADMIN — OXYCODONE AND ACETAMINOPHEN 2 TABLET(S): 5; 325 TABLET ORAL at 18:59

## 2019-11-06 RX ADMIN — Medication 5 MILLIGRAM(S): at 21:57

## 2019-11-06 RX ADMIN — Medication 12.5 MILLIGRAM(S): at 17:34

## 2019-11-06 RX ADMIN — OXYCODONE AND ACETAMINOPHEN 1 TABLET(S): 5; 325 TABLET ORAL at 09:14

## 2019-11-06 RX ADMIN — Medication 8: at 11:55

## 2019-11-06 RX ADMIN — MORPHINE SULFATE 1 MILLIGRAM(S): 50 CAPSULE, EXTENDED RELEASE ORAL at 18:59

## 2019-11-06 RX ADMIN — MORPHINE SULFATE 1 MILLIGRAM(S): 50 CAPSULE, EXTENDED RELEASE ORAL at 19:15

## 2019-11-06 RX ADMIN — INSULIN GLARGINE 5 UNIT(S): 100 INJECTION, SOLUTION SUBCUTANEOUS at 21:56

## 2019-11-06 RX ADMIN — OXYCODONE AND ACETAMINOPHEN 1 TABLET(S): 5; 325 TABLET ORAL at 22:57

## 2019-11-06 RX ADMIN — TAMSULOSIN HYDROCHLORIDE 0.4 MILLIGRAM(S): 0.4 CAPSULE ORAL at 21:57

## 2019-11-06 RX ADMIN — Medication 2: at 09:14

## 2019-11-06 RX ADMIN — CLOPIDOGREL BISULFATE 75 MILLIGRAM(S): 75 TABLET, FILM COATED ORAL at 11:55

## 2019-11-06 RX ADMIN — Medication 100 MILLIGRAM(S): at 14:15

## 2019-11-06 RX ADMIN — Medication 1 MILLIGRAM(S): at 21:56

## 2019-11-06 NOTE — DIETITIAN INITIAL EVALUATION ADULT. - OTHER INFO
Communicated with patient with pacific interpreters (ID#[id number]), [place]ian , reports adequate oral intake PTA and in-house. no information on wt loss available . Reports adhering to DM diet. declined additional DM education

## 2019-11-06 NOTE — PROGRESS NOTE ADULT - SUBJECTIVE AND OBJECTIVE BOX
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 Peripheral Arterial Disease    Planned Procedure: Right angiogram with ballooning, possible stent placement                          12.7   8.98  )-----------( 292      ( 06 Nov 2019 07:10 )             36.4       11-06    136  |  101  |  17  ----------------------------<  174<H>  4.2   |  29  |  0.83    Ca    9.0      06 Nov 2019 07:10  Phos  2.9     11-06  Mg     2.2     11-06    TPro  6.8  /  Alb  2.7<L>  /  TBili  0.5  /  DBili  x   /  AST  59<H>  /  ALT  69<H>  /  AlkPhos  143<H>  11-06      LIVER FUNCTIONS - ( 06 Nov 2019 07:10 )  Alb: 2.7 g/dL / Pro: 6.8 g/dL / ALK PHOS: 143 U/L / ALT: 69 U/L DA / AST: 59 U/L / GGT: x                 Type & Screen:  Type + Screen (11.05.19 @ 07:38)    ABO RH Interpretation: B POS: 11/05/2019 8:24  SCHEUNG  Attention: Second specimen is required for ABORH Confirmation.        EKG:  < from: 12 Lead ECG (11.04.19 @ 07:43) >  Diagnosis Line Normal sinus rhythm  Left axis deviation  Left ventricular hypertrophy with repolarization abnormality  Cannot rule out Septal infarct , age undetermined  Abnormal ECG    < end of copied text >      CXR:  < from: Xray Chest 1 View- PORTABLE-Urgent (11.04.19 @ 14:25) >  Impression: No active pulmonary disease  Sternotomy wires    < end of copied text >

## 2019-11-06 NOTE — PROGRESS NOTE ADULT - SUBJECTIVE AND OBJECTIVE BOX
79 year old Uzbek speaking male from home with PMH of type 2 DM, CAD s/p CABG, and HTN presented with complaint of right toe pain. Endocrinology was consulted for management of uncontrolled type 2 DM with hyperglycemia, A1c 10.9%.     Patient seen and examined at bedside. He reports feeling well overall and reports good appetite. He has a Sprite at bedside and reports he is drinking only small sips. He reports continued pain of the right foot. FS reviewed. Patient with borderline hypoglycemia at bedtime yesterday to 78. Mealtime insulin was discontinued this AM resulting in hyperglycemia >300 before lunch. Plan for angio tomorrow. Patient will be NPO after midnight.       MEDICATIONS  (STANDING):  bisacodyl 5 milliGRAM(s) Oral at bedtime  ceFAZolin   IVPB      ceFAZolin   IVPB 1000 milliGRAM(s) IV Intermittent every 8 hours  influenza   Vaccine 0.5 milliLiter(s) IntraMuscular once  insulin lispro (HumaLOG) corrective regimen sliding scale   SubCutaneous three times a day before meals  insulin lispro (HumaLOG) corrective regimen sliding scale   SubCutaneous at bedtime  insulin lispro Injectable (HumaLOG) 4 Unit(s) SubCutaneous three times a day before meals  losartan 50 milliGRAM(s) Oral daily  metoprolol tartrate 12.5 milliGRAM(s) Oral two times a day  senna 2 Tablet(s) Oral at bedtime  simvastatin 40 milliGRAM(s) Oral at bedtime  sodium chloride 0.9%. 1000 milliLiter(s) (70 mL/Hr) IV Continuous <Continuous>  tamsulosin 0.4 milliGRAM(s) Oral at bedtime    MEDICATIONS  (PRN):  acetaminophen   Tablet .. 650 milliGRAM(s) Oral every 6 hours PRN Mild Pain (1 - 3)  oxycodone    5 mG/acetaminophen 325 mG 1 Tablet(s) Oral every 6 hours PRN Moderate Pain (4 - 6)  oxycodone    5 mG/acetaminophen 325 mG 2 Tablet(s) Oral every 6 hours PRN Severe Pain (7 - 10)      REVIEW OF SYSTEMS:  CONSTITUTIONAL: No fever, weight loss, or fatigue  EYES: No eye pain, visual disturbances, or discharge  ENMT:  No difficulty hearing, tinnitus, vertigo; No sinus or throat pain  NECK: No pain or stiffness  RESPIRATORY: No cough, wheezing, chills or hemoptysis; No shortness of breath  CARDIOVASCULAR: No chest pain, palpitations, dizziness, or leg swelling  GASTROINTESTINAL: No abdominal or epigastric pain. No nausea, vomiting, or hematemesis; No diarrhea or constipation. No melena or hematochezia.  GENITOURINARY: No dysuria, frequency, hematuria, or incontinence  NEUROLOGICAL: No headaches, memory loss, loss of strength, numbness, or tremors  SKIN: right second toe wound with pain and darkening of the skin, No itching, burning, or rashes  LYMPH NODES: No enlarged glands  ENDOCRINE: No heat or cold intolerance; No hair loss  MUSCULOSKELETAL: No joint pain or swelling; No muscle, back, or extremity pain  PSYCHIATRIC: No depression, anxiety, mood swings, or difficulty sleeping  HEME/LYMPH: No easy bruising, or bleeding gums  ALLERGY AND IMMUNOLOGIC: No hives or eczema      PHYSICAL EXAM:    VITAL SIGNS  T(C): 36.7 (11-06-19 @ 08:03), Max: 36.7 (11-06-19 @ 08:03)  HR: 75 (11-06-19 @ 08:03) (65 - 75)  BP: 121/54 (11-06-19 @ 08:03) (101/50 - 133/67)  RR: 16 (11-06-19 @ 08:03) (16 - 16)  SpO2: 98% (11-06-19 @ 08:03) (95% - 98%)    General: Alert and oriented. No acute distress.   Eye: Extraocular movements are intact.    HENT:  Normocephalic.    Respiratory: Respirations are non-labored, Symmetrical chest wall expansion.    Gastrointestinal:  Non-distended.    Neurologic:  Alert and oriented X 3, No focal defects, Cranial Nerves II-XII are grossly intact.    Psychiatric:  Cooperative, Appropriate mood & affect.  SKIN: right second toe wound with skin discoloration, no discharge. No rashes or lesions    LABS:                        12.7   8.98  )-----------( 292      ( 06 Nov 2019 07:10 )             36.4     11-06    136  |  101  |  17  ----------------------------<  174<H>  4.2   |  29  |  0.83    Ca    9.0      06 Nov 2019 07:10  Phos  2.9     11-06  Mg     2.2     11-06    TPro  6.8  /  Alb  2.7<L>  /  TBili  0.5  /  DBili  x   /  AST  59<H>  /  ALT  69<H>  /  AlkPhos  143<H>  11-06        CAPILLARY BLOOD GLUCOSE      POCT Blood Glucose.: 312 mg/dL (06 Nov 2019 11:31)  POCT Blood Glucose.: 157 mg/dL (06 Nov 2019 08:25)  POCT Blood Glucose.: 168 mg/dL (06 Nov 2019 06:02)  POCT Blood Glucose.: 78 mg/dL (05 Nov 2019 21:07)  POCT Blood Glucose.: 272 mg/dL (05 Nov 2019 17:59)  POCT Blood Glucose.: 179 mg/dL (05 Nov 2019 16:33)

## 2019-11-06 NOTE — PROGRESS NOTE ADULT - ASSESSMENT
A 80yo M with h/o PAD and chronic Right 2nd toe wound, nonhealing  P:  1. D/w patient via [facility]  #[id number] about procedure tomorrow and all risks and benefits involved in the angiogram w/ballooning possible stent. Pt agreed to proceed  2. NPO after midnight  3. IV fluids while NPO  4. AM labs  5. Hold AM lovenox and Plavix  6. Medical clearance needed  7. Consent to be obtained

## 2019-11-06 NOTE — PROGRESS NOTE ADULT - PROBLEM SELECTOR PLAN 3
- Pt does not remember his meds   - will continue Lisinopril  for now with parameters (given hx of DM)  - Monitor BP closely and adjust medications if clinically indicated.  - DASH diet. -vascular consult reviewed  -plan for angiogram (+/- intervention) 11/7   -LESLIE reviewed- 0.71 on the right foot, moderate   medical clearance    -EKG 11/4: normal sinus rhythm, left axis deviation, left ventricular hypertrophy with repolarization abnormality  -RCRI score 2 points   -Echo ordered -vascular consult reviewed  -plan for angiogram (+/- intervention) 11/7   -LESLIE reviewed- 0.71 on the right foot, moderate   medical clearance    -EKG 11/4: normal sinus rhythm, left axis deviation, left ventricular hypertrophy with repolarization abnormality  -RCRI score 3 points for 15% risk   -Echo ordered

## 2019-11-06 NOTE — PROGRESS NOTE ADULT - PROBLEM SELECTOR PLAN 1
- p/w right 2nd Toe and Cellulitis with erythema, swelling, distally purple  - warm to touch with some tenderness and cool at the tip   - s/p Vanco adn Zosyn in ED  - No Leukocytosis   - Afebrile   - c/w vanco and Zosyn   - Recommended right leg elevation to the patient  - lactate 1.7  - ESR 45   - CRP 4.07  - x-ray right foot : no osteomyelitis of toes   - c/w pain meds Tylenol and oxycodone PRN   - c/w senna and dulcolax  - LESLIE reviewed- 0.71 on the right foot, moderate   - f/u Blood Cx (Specimen received)  - f/u swab cx of wound- cancelled  - f/u PT- pain right foot with ambulation  - Podiatry consult noted  - Vacular Consult noted - possible angiogram on thursday  ** ID consulted Dr. Corral - p/w right 2nd Toe and Cellulitis with erythema, swelling, distally purple  - warm to touch with some tenderness and cool at the tip   - s/p Vanco adn Zosyn in ED  - No Leukocytosis   - Afebrile   - c/w vanco and Zosyn   - Recommended right leg elevation to the patient  - x-ray right foot : no osteomyelitis of toes   - LESLIE reviewed- 0.71 on the right foot, moderate   - f/u Blood Cx (Specimen received)  - Vacular Consult noted - possible angiogram on thursday  - ID consulted reviewed  - Patient is a class III risk - p/w right 2nd Toe and Cellulitis with erythema, swelling, distally purple  - warm to touch with some tenderness and cool at the tip   - s/p Vanco adn Zosyn in ED  - No Leukocytosis   - Afebrile   - c/w vanco and Zosyn   - Recommended right leg elevation to the patient  - x-ray right foot : no osteomyelitis of toes   - LESLIE reviewed- 0.71 on the right foot, moderate   - podiatry consult reviewed- no intervention  - ID consulted reviewed-

## 2019-11-06 NOTE — DIETITIAN INITIAL EVALUATION ADULT. - PROBLEM SELECTOR PLAN 2
- Pt does not remember his meds but he does not take insulin   - will start sliding scale and Lantus 5 units at bedtime   - Monitor blood sugars AC/HS and adjust as needed  - goal -180.   - f/u HgA1c  - Carbohydrate consistent diabetic diet with evening snacks.   ****Morning team to contact patients pharmacy for home med rec  [phone] or [phone] **** Pt does not remember meds and I called pharmacy but no response

## 2019-11-06 NOTE — DISCHARGE NOTE PROVIDER - PROVIDER TOKENS
FREE:[LAST:[Cirlincione],FIRST:[Bryant],PHONE:[phone]],FAX:[(   [phone]    -],ADDRESS:[Podiatry/Wound Care  [address] [id number]],FOLLOWUP:[1 week]]

## 2019-11-06 NOTE — PROGRESS NOTE ADULT - ATTENDING COMMENTS
Patient seen/evaluated at bedside 11/6/2019. I agree with the resident progress note/outlined plan of care. My independent findings and conclusions are documented.    Hebrew  [id number]  Overnight FS to 78. Pt reports significant pain at right toe today. Reports insomnia. No fevers/chills. He is aware of plan for angiogram tomorrow and agrees    Pt denies orthopnea, PND, chest pain, sob, palpitations. Prior to injuring his right foot, he was able to ambulate >1 city block briskly or ascend 1 flight of stairs w/out sob, chest pain, BEJARANO. Currently his ability to manuever/ambulate is limited by right foot pain    PE  AAOx3, non toxic appearing/NAD  right foot: slight extension erythema at right foot extending to right second toe + tender to palpation  R 2nd toe tip necrosis w/ edema, w/ 0.6 cm plantar surface ulcer    ABIs: Right LESLIE compatible with moderate peripheral vascular disease. Segmental   disease within the right calf. Normal left LESLIE.    EKG on admission: NSR at 71 BMP, LVH w/ early repolarization pattern , TWI in V2-V6  1. right toe/foot cellulitis  2. 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 DM T II w/ severe hyperglycemia (hgbA1c>10)  4. HTN  5. PAD  6. CAD s/p CABG    de-escalate abx to kefzol per ID  continue to monitor for improvement--> today there appears to be some slight extension for erythema to forefoot  local wound care, pain control--> can give prn doses of morphine if percocet is ineffective  angiogram to [facility] tomorrow  **Pt is intermediate risk for an intermediate  risk procedure. RCRI class III risk. No medical contraindication to planned angiogram. May proceed to intervention w/out further testing. If other vascular interventions required, would risk stratify w/ cardiology consultant  plavix/lovenox deferred in light of planned intervention  c/w beta blocker, resume ARB after procedure  lantus decreased to 5 units w/ lispro 5 tid due to npo status this evening

## 2019-11-06 NOTE — DIETITIAN INITIAL EVALUATION ADULT. - PROBLEM SELECTOR PLAN 3
- Pt does not remember his meds   - will start Lisinopril  for now with parameters (given hx of DM)  - Monitor BP closely and adjust medications if clinically indicated.  - DASH diet.    **** Morning team to contact patients pharmacy for home med rec  [phone] or [phone] **** Pt does not remember meds and I called pharmacy but no response

## 2019-11-06 NOTE — DISCHARGE NOTE PROVIDER - NSDCCPCAREPLAN_GEN_ALL_CORE_FT
PRINCIPAL DISCHARGE DIAGNOSIS  Diagnosis: PVD (peripheral vascular disease)  Assessment and Plan of Treatment: You came with pain in the right foot.  Ankle Branchial Index with Pulse Volume recordings were performed indicated decreased blood flow in your right leg and foot.  Vascular Surgery was consulted and performed an angiogram and found_________      SECONDARY DISCHARGE DIAGNOSES  Diagnosis: HTN (hypertension)  Assessment and Plan of Treatment: Blood pressure was well controlled during hospital stay.  Continue home blood pressure medication upon discharge.  Monitor your blood pressure regularly.    Diagnosis: Ambulatory dysfunction  Assessment and Plan of Treatment: You came with right foot pain secondary to PVD and cellulitis.  Physical therapy evaluated you.    Diagnosis: Cellulitis of toe of right foot  Assessment and Plan of Treatment: You came with right foot pain and cellulitis.  Podiatry evaluated and recommended betadine dressings to keep your wound dry.  Infectious disease evaluated your cellulitis and recommended IV antibiotics while in the hospital.   ****    Diagnosis: Diabetes  Assessment and Plan of Treatment: You came to the ED with uncontrolled blood sugar.  You stated that you discontinued your home medications because you felt you had improved and the multiple medications lead to upset stomach.  Endocrinology evaluated your diabetic medications and recommended increased insulin dosing.  While you were in the hospital, your blood sugars were better controlled.  Endocrinology recommended you follow up with your primary care provider to re-evaluate your home diabetic medications PRINCIPAL DISCHARGE DIAGNOSIS  Diagnosis: PVD (peripheral vascular disease)  Assessment and Plan of Treatment: You came with pain in the right foot.  Ankle Branchial Index with Pulse Volume recordings were performed indicating decreased blood flow in your right leg and foot.  Vascular Surgery was consulted and performed an angiogram which was unsuccessful.  Vascular team recommended      SECONDARY DISCHARGE DIAGNOSES  Diagnosis: HTN (hypertension)  Assessment and Plan of Treatment: Blood pressure was well controlled during hospital stay.  Continue home blood pressure medication upon discharge.  Monitor your blood pressure regularly.    Diagnosis: Diabetes  Assessment and Plan of Treatment: You came to the ED with uncontrolled blood sugar.  You stated that you discontinued your home medications because you felt you had improved and the multiple medications lead to upset stomach.  Endocrinology evaluated your diabetic medications and recommended increased insulin dosing.  While you were in the hospital, your blood sugars were better controlled.  Endocrinology recommended you follow up with your primary care provider to re-evaluate your home diabetic medications      Diagnosis: Ambulatory dysfunction  Assessment and Plan of Treatment: You came with right foot pain secondary to PVD and cellulitis.  Physical therapy evaluated you.    Diagnosis: Cellulitis of toe of right foot  Assessment and Plan of Treatment: You came with right foot pain and cellulitis.  Podiatry evaluated and recommended betadine dressings to keep your wound dry.  Infectious disease evaluated your cellulitis and recommended IV antibiotics while in the hospital.   **** PRINCIPAL DISCHARGE DIAGNOSIS  Diagnosis: PVD (peripheral vascular disease)  Assessment and Plan of Treatment: You came with pain in the right foot.  Ankle Branchial Index with Pulse Volume recordings were performed indicating decreased blood flow in your right leg and foot.  Vascular Surgery was consulted and performed an angiogram which was unsuccessful. After being placed on heparin drip, Vascular Surgery performed a second angiogram with success.  Podiatric Surgery Team performed a right partial second ray amputation to remove the painful gangrenous second toe as an attempt to minimize pain and salvage your foot.  You are an increased risk of limb loss with your limited blood flow.  Please continue applying nitroglycerin paste and please follow up at -[address] [place] [address] Podiatry/Wound care clinic.      SECONDARY DISCHARGE DIAGNOSES  Diagnosis: HTN (hypertension)  Assessment and Plan of Treatment: Blood pressure was well controlled during hospital stay.  Continue home blood pressure medication upon discharge.  Monitor your blood pressure regularly.    Diagnosis: Ambulatory dysfunction  Assessment and Plan of Treatment: You came with right foot pain secondary to PVD and cellulitis.  Physical therapy evaluated you before and after surgery.  They recommend home physical therapy to increasing your ability to ambulate.    Diagnosis: Cellulitis of toe of right foot  Assessment and Plan of Treatment: You came with right foot pain and cellulitis.  Podiatry evaluated and recommended betadine dressings to keep your wound dry.  Infectious disease evaluated your cellulitis and recommended IV antibiotics while in the hospital.   Podiatric Surgery performed a right partial second ray amputation and your cellulitis resolved.  Please monitor your foot for any signs of infection.    Diagnosis: Diabetes  Assessment and Plan of Treatment: You came to the ED with uncontrolled blood sugar.  You stated that you discontinued your home medications because you felt you had improved and the multiple medications lead to upset stomach.  Endocrinology evaluated your diabetic medications and recommended increased insulin dosing.  While you were in the hospital, your blood sugars were better controlled.  Endocrinology recommended you follow up with your primary care provider to re-evaluate your home diabetic medications PRINCIPAL DISCHARGE DIAGNOSIS  Diagnosis: PVD (peripheral vascular disease)  Assessment and Plan of Treatment: You came with pain in the right foot.  Ankle Branchial Index with Pulse Volume recordings were performed indicating decreased blood flow in your right leg and foot.  Vascular Surgery was consulted and performed an angiogram which was initially unsuccessful. After being placed on heparin drip, Vascular Surgery performed a second angiogram with success.  Podiatric Surgery Team performed a right partial second ray amputation to remove the painful gangrenous second toe as an attempt to minimize pain and salvage your foot.  You are at an increased risk of limb loss with your limited blood flow.  Please continue applying nitroglycerin paste and please follow up at -[address] [place] [address] Podiatry/Wound care clinic.      SECONDARY DISCHARGE DIAGNOSES  Diagnosis: HTN (hypertension)  Assessment and Plan of Treatment: Blood pressure was well controlled during hospital stay.  Continue home blood pressure medication upon discharge.  Monitor your blood pressure regularly.    Diagnosis: Ambulatory dysfunction  Assessment and Plan of Treatment: You came with right foot pain secondary to PVD and cellulitis.  Physical therapy evaluated you before and after surgery.  They recommend home physical therapy to increasing your ability to ambulate.    Diagnosis: Cellulitis of toe of right foot  Assessment and Plan of Treatment: You came with right foot pain and cellulitis.  Podiatry evaluated and recommended betadine dressings to keep your wound dry.  Infectious disease evaluated your cellulitis and recommended IV antibiotics while in the hospital.   Podiatric Surgery performed a right partial second ray amputation and your cellulitis resolved.  Please monitor your foot for any signs of infection.    Diagnosis: Diabetes  Assessment and Plan of Treatment: You came to the ED with uncontrolled blood sugar.  You stated that you discontinued your home medications because you felt you had improved and the multiple medications lead to upset stomach.  Endocrinology evaluated your diabetic medications and recommended increased insulin dosing.  While you were in the hospital, your blood sugars were better controlled.  Endocrinology recommended you follow up with your primary care provider to re-evaluate your home diabetic medications PRINCIPAL DISCHARGE DIAGNOSIS  Diagnosis: PVD (peripheral vascular disease)  Assessment and Plan of Treatment: You came with pain in the right foot.  Ankle Branchial Index with Pulse Volume recordings were performed indicating decreased blood flow in your right leg and foot.  Vascular Surgery was consulted and performed an angiogram which was initially unsuccessful. After being placed on heparin drip, Vascular Surgery performed a second angiogram with success.  Podiatric Surgery Team performed a right partial second ray amputation to remove the painful gangrenous second toe as an attempt to minimize pain and salvage your foot.  You are at an increased risk of limb loss with your limited blood flow.  Please continue applying nitroglycerin paste and please follow up at [address] [place] [address] Podiatry/Wound care clinic.      SECONDARY DISCHARGE DIAGNOSES  Diagnosis: HTN (hypertension)  Assessment and Plan of Treatment: Blood pressure was well controlled during hospital stay.  Continue home blood pressure medication upon discharge.  Monitor your blood pressure regularly.    Diagnosis: Ambulatory dysfunction  Assessment and Plan of Treatment: You came with right foot pain secondary to PVD and cellulitis.  Physical therapy evaluated you before and after surgery.  They recommend home physical therapy to increasing your ability to ambulate.    Diagnosis: Cellulitis of toe of right foot  Assessment and Plan of Treatment: You came with right foot pain and cellulitis.  Podiatry evaluated and recommended betadine dressings to keep your wound dry.  Infectious disease evaluated your cellulitis and recommended IV antibiotics while in the hospital.   Podiatric Surgery performed a right partial second ray amputation and your cellulitis resolved.  Right foot surgical site dressing changes every other day to include 4x4 gauze and Kel. Please monitor your foot for any signs of infection. Please make an appointment and follow up at [address] [place] [address] Wound care/podiatry clinic. [phone] next week.    Diagnosis: Diabetes  Assessment and Plan of Treatment: You came to the ED with uncontrolled blood sugar.  You stated that you discontinued your home medications because you felt you had improved and the multiple medications lead to upset stomach.  Endocrinology evaluated your diabetic medications and recommended increased insulin dosing.  While you were in the hospital, your blood sugars were better controlled.  Endocrinology recommended 11 units of Lantus at bedtimes and 4 units Humalog before each meal.  Please use a moderate sliding scale during the daytime and a simple sliding scale overnight. PRINCIPAL DISCHARGE DIAGNOSIS  Diagnosis: PVD (peripheral vascular disease)  Assessment and Plan of Treatment: You came with pain in the right foot.  Ankle Branchial Index with Pulse Volume recordings were performed indicating decreased blood flow in your right leg and foot.  Vascular Surgery was consulted and performed an angiogram which was initially unsuccessful. After being placed on heparin drip, Vascular Surgery performed a second angiogram with success.  Podiatric Surgery Team performed a right partial second ray amputation to remove the painful gangrenous second toe as an attempt to minimize pain and salvage your foot.  You are at an increased risk of limb loss with your limited blood flow.  Please continue change the dressing every other day with 4x4 gauze and kel and please follow up at [address] [place] [address] Podiatry/Wound care clinic.      SECONDARY DISCHARGE DIAGNOSES  Diagnosis: HTN (hypertension)  Assessment and Plan of Treatment: Blood pressure was well controlled during hospital stay.  Continue home blood pressure medication upon discharge.  Monitor your blood pressure regularly.    Diagnosis: Ambulatory dysfunction  Assessment and Plan of Treatment: You came with right foot pain secondary to PVD and cellulitis.  Physical therapy evaluated you before and after surgery.  They recommend home physical therapy to increasing your ability to ambulate.    Diagnosis: Cellulitis of toe of right foot  Assessment and Plan of Treatment: You came with right foot pain and cellulitis.  Infectious disease evaluated your cellulitis and recommended IV antibiotics while in the hospital.   Podiatric Surgery performed a right partial second ray amputation and your cellulitis resolved.  Right foot surgical site dressing changes every other day to include 4x4 gauze and Kel. Please monitor your foot for any signs of infection. Please make an appointment and follow up at [address] [place] [address] Wound care/podiatry clinic. [phone] next week.    Diagnosis: Diabetes  Assessment and Plan of Treatment: You came to the ED with uncontrolled blood sugar.  You stated that you discontinued your home medications because you felt you had improved and the multiple medications lead to upset stomach.  Endocrinology evaluated your diabetic medications and recommended increased insulin dosing.  While you were in the hospital, your blood sugars were better controlled.  Endocrinology recommended 11 units of Lantus at bedtimes and 4 units Humalog before each meal.  Please use a moderate sliding scale during the daytime and a simple sliding scale overnight.

## 2019-11-06 NOTE — PROGRESS NOTE ADULT - PROBLEM SELECTOR PLAN 4
IMPROVE VTE Individual Risk Assessment    RISK                                                                Points    [  ] Previous VTE                                                  3    [  ] Thrombophilia                                               2    [  ] Lower limb paralysis                                      2        (unable to hold up >15 seconds)    [  ] Current Cancer                                              2         (within 6 months)  [X] Immobilization > 24 hrs                                1  [  ] ICU/CCU stay > 24 hours                              1  [X] Age > 60                                                      1    IMPROVE VTE Score _____2____  c/w Lovenox 40 mg qd - Pt does not remember his meds   - will continue Lisinopril  for now with parameters (given hx of DM)  - Monitor BP closely and adjust medications if clinically indicated.  - DASH diet.

## 2019-11-06 NOTE — PROGRESS NOTE ADULT - PROBLEM SELECTOR PLAN 2
- Pt does not remember his meds but he does not take insulin   - will start sliding scale and Lantus 5 units at bedtime   - Monitor blood sugars AC/HS and adjust as needed  - goal -180.   - HgA1c 10.9  - Carbohydrate consistent diabetic diet with evening snacks.  -Endocrine Consult - Pt does not remember his meds but he does not take insulin   - will start sliding scale and Lantus 5 units at bedtime   - Monitor blood sugars AC/HS and adjust as needed  - goal -180.   - Carbohydrate consistent diabetic diet with evening snacks.  -Endocrine Consult reviewed

## 2019-11-06 NOTE — PROGRESS NOTE ADULT - SUBJECTIVE AND OBJECTIVE BOX
YEOM, CHANG  79y  Male      Patient is a 79y old  Male who presents with a chief complaint of 2nd right toe pain (05 Nov 2019 19:51)      INTERVAL HPI/OVERNIGHT EVENTS: Blood sugar dropped to 78 at 21:00 last night.  Patient states overall he is improved but still complains of pain in the right leg and foot.        REVIEW OF SYSTEMS:  CONSTITUTIONAL: No fever, weight loss, or fatigue  EYES: No eye pain, visual disturbances, or discharge  ENMT:  No difficulty hearing, tinnitus, vertigo  NECK: No pain or stiffness  RESPIRATORY: No cough, wheezing, chills or hemoptysis; No shortness of breath  CARDIOVASCULAR: No chest pain, dizziness, or leg swelling  GASTROINTESTINAL: Not eating frequently at home, no abdominal pain or discomfort  GENITOURINARY: No dysuria, frequency, or incontinence  NEUROLOGICAL: No headaches, numbness, or tremors  SKIN: Right 2nd toe swelling, hemorrhagic blister and erythema in dorsal right foot  LYMPH NODES: No enlarged glands  ENDOCRINE: No heat or cold intolerance; No hair loss  MUSCULOSKELETAL: Pain in right foot  PSYCHIATRIC: normal affect and behavior  HEME/LYMPH: No easy bruising, or bleeding gums  ALLERY AND IMMUNOLOGIC: No hives or eczema  FAMILY HISTORY:    T(C): 36.2 (11-06-19 @ 00:03), Max: 36.8 (11-05-19 @ 07:36)  HR: 72 (11-06-19 @ 04:57) (64 - 73)  BP: 105/59 (11-06-19 @ 04:57) (101/50 - 133/67)  RR: 16 (11-06-19 @ 00:03) (16 - 17)  SpO2: 97% (11-06-19 @ 00:03) (95% - 99%)  Wt(kg): --Vital Signs Last 24 Hrs  T(C): 36.2 (06 Nov 2019 00:03), Max: 36.8 (05 Nov 2019 07:36)  T(F): 97.2 (06 Nov 2019 00:03), Max: 98.3 (05 Nov 2019 07:36)  HR: 72 (06 Nov 2019 04:57) (64 - 73)  BP: 105/59 (06 Nov 2019 04:57) (101/50 - 133/67)  BP(mean): --  RR: 16 (06 Nov 2019 00:03) (16 - 17)  SpO2: 97% (06 Nov 2019 00:03) (95% - 99%)  No Known Allergies    PHYSICAL EXAM:  GENERAL: NAD, well-groomed, well-developed  HEAD:  normocephalic;   EYES: EOMI, PERRLA, conjunctiva and sclera clear  ENMT: mouth moist  NECK: Supple, No JVD, Normal thyroid  NERVOUS SYSTEM:  Alert & Oriented X3, Good concentration; Motor Strength 5/5 B/L upper and lower extremities; DTRs 2+ intact and symmetric  CHEST/LUNG: Clear to percussion bilaterally; No rales, rhonchi, wheezing, or rubs  HEART: Regular rate and rhythm; No murmurs, rubs, or gallops  ABDOMEN: Soft, Nontender, Nondistended; Bowel sounds present  EXTREMITIES:  no calf tenderness, no cyanosis, no edema, RLE 2nd digit with violaceous discoloration with surrounding erythema extending to dorsal foot  LYMPH: No lymphadenopathy noted  SKIN: No rashes or lesions      Consultant(s) Notes Reviewed:  [x ] YES  [ ] NO  Care Discussed with Consultants/Other Providers [ x] YES  [ ] NO    LABS:      RADIOLOGY & ADDITIONAL TESTS:    Imaging Personally Reviewed:  [X ] YES  [ ] NO  acetaminophen   Tablet .. 650 milliGRAM(s) Oral every 6 hours PRN  bisacodyl 5 milliGRAM(s) Oral at bedtime  ceFAZolin   IVPB      ceFAZolin   IVPB 1000 milliGRAM(s) IV Intermittent every 8 hours  clopidogrel Tablet 75 milliGRAM(s) Oral daily  enoxaparin Injectable 40 milliGRAM(s) SubCutaneous daily  influenza   Vaccine 0.5 milliLiter(s) IntraMuscular once  insulin glargine Injectable (LANTUS) 11 Unit(s) SubCutaneous at bedtime  insulin lispro (HumaLOG) corrective regimen sliding scale   SubCutaneous three times a day before meals  insulin lispro (HumaLOG) corrective regimen sliding scale   SubCutaneous at bedtime  insulin lispro Injectable (HumaLOG) 7 Unit(s) SubCutaneous three times a day before meals  losartan 50 milliGRAM(s) Oral daily  metoprolol tartrate 12.5 milliGRAM(s) Oral two times a day  oxycodone    5 mG/acetaminophen 325 mG 1 Tablet(s) Oral every 6 hours PRN  oxycodone    5 mG/acetaminophen 325 mG 2 Tablet(s) Oral every 6 hours PRN  senna 2 Tablet(s) Oral at bedtime  simvastatin 40 milliGRAM(s) Oral at bedtime  sodium chloride 0.9%. 1000 milliLiter(s) IV Continuous <Continuous>  tamsulosin 0.4 milliGRAM(s) Oral at bedtime      HEALTH ISSUES - PROBLEM Dx:  Goals of care, counseling/discussion: Goals of care, counseling/discussion  Prophylactic measure: Prophylactic measure  HTN (hypertension): HTN (hypertension)  Diabetes: Diabetes  Cellulitis of toe of right foot: Cellulitis of toe of right foot

## 2019-11-06 NOTE — PROGRESS NOTE ADULT - PROBLEM SELECTOR PLAN 5
Goals of care discussed with patient  meaning of CPR described in detail and wants everything to be done after understanding risk associated with age.  Pt is FULL CODE. IMPROVE VTE Individual Risk Assessment    RISK                                                                Points    [  ] Previous VTE                                                  3    [  ] Thrombophilia                                               2    [  ] Lower limb paralysis                                      2        (unable to hold up >15 seconds)    [  ] Current Cancer                                              2         (within 6 months)  [X] Immobilization > 24 hrs                                1  [  ] ICU/CCU stay > 24 hours                              1  [X] Age > 60                                                      1    IMPROVE VTE Score _____2____  c/w Lovenox 40 mg qd

## 2019-11-06 NOTE — DISCHARGE NOTE PROVIDER - NSDCMRMEDTOKEN_GEN_ALL_CORE_FT
Actos 30 mg oral tablet: 1 tab(s) orally once a day  glipizide-metformin 5 mg-500 mg oral tablet: 2 tab(s) orally 2 times a day  Jardiance 25 mg oral tablet: 1 tab(s) orally once a day (in the morning)  linagliptin 5 mg oral tablet: 1 tab(s) orally once a day  losartan 50 mg oral tablet: 1 tab(s) orally once a day  metoprolol succinate 50 mg oral tablet, extended release: 1 tab(s) orally once a day  Percocet 5/325 oral tablet: 1 tab(s) orally once a day  Plavix 75 mg oral tablet: 1 tab(s) orally once a day  simvastatin 40 mg oral tablet: 1 tab(s) orally once a day (at bedtime)  tamsulosin 0.4 mg oral capsule: 1 cap(s) orally once a day  Vascepa 1 g oral capsule: 2 cap(s) orally 2 times a day aspirin 81 mg oral delayed release tablet: 1 tab(s) orally once a day  gabapentin 300 mg oral capsule: 1 cap(s) orally every 12 hours  HumaLOG 100 units/mL injectable solution: 4 unit(s) injectable 3 times a day (before meals)   Lantus 100 units/mL subcutaneous solution: 11 unit(s) subcutaneous once a day (at bedtime)  losartan 50 mg oral tablet: 1 tab(s) orally once a day  metoprolol succinate 50 mg oral tablet, extended release: 1 tab(s) orally once a day  oxycodone-acetaminophen 5 mg-325 mg oral tablet: 1 tab(s) orally 2 times a day, As Needed - 10)  Plavix 75 mg oral tablet: 1 tab(s) orally once a day  polyethylene glycol 3350 oral powder for reconstitution: 17 gram(s) orally once a day, As needed, Constipation  senna oral tablet: 2 tab(s) orally once a day (at bedtime)  simvastatin 40 mg oral tablet: 1 tab(s) orally once a day (at bedtime)  tamsulosin 0.4 mg oral capsule: 1 cap(s) orally once a day  Vascepa 1 g oral capsule: 2 cap(s) orally 2 times a day aspirin 81 mg oral delayed release tablet: 1 tab(s) orally once a day  gabapentin 300 mg oral capsule: 1 cap(s) orally every 12 hours  HumaLOG 100 units/mL injectable solution: 4 unit(s) injectable 3 times a day (before meals)   Keflex 500 mg oral capsule: 1 cap(s) orally every 6 hours   Lantus 100 units/mL subcutaneous solution: 11 unit(s) subcutaneous once a day (at bedtime)  losartan 50 mg oral tablet: 1 tab(s) orally once a day  metoprolol succinate 50 mg oral tablet, extended release: 1 tab(s) orally once a day  oxycodone-acetaminophen 5 mg-325 mg oral tablet: 1 tab(s) orally 2 times a day, As Needed - 10)  Plavix 75 mg oral tablet: 1 tab(s) orally once a day  polyethylene glycol 3350 oral powder for reconstitution: 17 gram(s) orally once a day, As needed, Constipation  senna oral tablet: 2 tab(s) orally once a day (at bedtime)  simvastatin 40 mg oral tablet: 1 tab(s) orally once a day (at bedtime)  tamsulosin 0.4 mg oral capsule: 1 cap(s) orally once a day  Vascepa 1 g oral capsule: 2 cap(s) orally 2 times a day

## 2019-11-06 NOTE — PROGRESS NOTE ADULT - ASSESSMENT
79 year old Lithuanian speaking male from home lives alone ambulates with cane at baseline with PMH of DM, HTN and PSH open heart surgery presents with toe pain. Reports R 2nd toe pain and wound since tripping and falling on it at [employer] on october 29th while wearing slippers . Saw PMD Dr. David Domínguez who prescribed gabapentin and oxycodone, noticed a cut on a toe, applied tar ointment and controlled pain with medications. pt states no imaging at that time. pt states 2/2 the pain has been having difficulty getting around and has not been eating, and feels that he needs to stay in the hospital pt reported excruciating pain with increased numbness and black discoloration on toe with severe pain intermittent cold sensation of feet.  No h/o foot wounds, no prior vascular testing, not on AC. pt denies fever or chills, CP or SOB. nausea , vomiting , diarrhea , exposure to dirty water or mosquitoes bites.     Pt is FULL CODE.  pt is admitted for management of Cellulitis

## 2019-11-06 NOTE — PROGRESS NOTE ADULT - ASSESSMENT
79 year old Slovak speaking male from home with PMH of type 2 DM, CAD s/p CABG, and HTN presented with complaint of right toe pain. Endocrinology was consulted for management of uncontrolled type 2 DM with hyperglycemia, A1c 10.9%.     1. Uncontrolled type 2 DM with hyperglycemia   -A1c 10.9% likely secondary to dietary indiscretion and medication noncompliance  -Pre-lunch BG >300 secondary to insulin held at breakfast  -recommend to decrease to Lantus 5 units at bedtime as patient will be NPO after midnight. Can resume Lantus 11 units at bedtime tomorrow (11/7/19)  -start Humalog 4 units TID with meals  -recommend to continue moderate dose mealtime rapid acting insulin as below  BG 70-100mg/dL 0 units  -150 mg/dL 0 units  -200 mg/dL 2 units  -250 mg/dL 4 units  -300 mg/dL 6 units  -350 mg/dL 8 units  -400 mg/dL 10 units  BG > 400mg/dL 12 units  -FS AC HS  -ensure consistent carbohydrate diet   -will monitor FS and adjust accordingly   --if patient is NPO for any reason please change FS and sliding scale to NPO lispro scale     2. Right second toe wound  -wound care as per podiatry  -plan for right leg angio tomorrow (11/7/19)  -continue antibiotics as per ID  -optimize glycemic control    3. HTN  -BP at goal  -continue losartan and metoprolol    Plan discussed with primary team  Please  call  w/ any questions or concerns [phone]

## 2019-11-06 NOTE — DISCHARGE NOTE PROVIDER - CARE PROVIDER_API CALL
Bryant Jenkins  Podiatry/Wound Care  [address] [place] [address]  Phone: [phone]  Fax: (   )    -  Follow Up Time: 1 week

## 2019-11-06 NOTE — DISCHARGE NOTE PROVIDER - HOSPITAL COURSE
79 year old Telugu speaking male from home lives alone ambulates with cane at baseline with PMH of DM, HTN and PSH open heart surgery presents with toe pain. Reports R 2nd toe pain and wound since tripping and falling on it at [employer] on october 29th while wearing slippers . Saw PMD Dr. David Domínguez who prescribed gabapentin and oxycodone, noticed a cut on a toe, applied tar ointment and controlled pain with medications. pt states no imaging at that time. pt states 2/2 the pain has been having difficulty getting around and has not been eating, and feels that he needs to stay in the hospital pt reported excruciating pain with increased numbness and black discoloration on toe with severe pain intermittent cold sensation of feet.  No h/o foot wounds, no prior vascular testing, not on AC. pt denies fever or chills, CP or SOB. nausea , vomiting , diarrhea , exposure to dirty water or mosquitoes bites.         Hospital Course:  Upon arrival in the emergency department, vascular was consulted to evaluated blood flow to the extremities and recommended LESLIE/PVR.  Podiatry was consulted to evaluated the ulcer on the second toe and recommended LESLIE/PVR with local wound care consisting of Betadine dsd. Infection disease was consulted to manage cellulitis of the leg and foot and recommended choice of IV antibiotics.  Endocrinology was consulted to assisted in stabilizing blood sugars.  Following LESLIE/PVR results, vascular surgery ***performed an angiogram Patient is a 79 year old Hungarian speaking male from home lives alone ambulates with cane at baseline with PMH of DM, HTN and PSH of open heart surgery, who presents with toe pain. Reports R 2nd toe pain and wound since tripping and falling on it at [employer] on October 29th while wearing slippers. Saw PMD Dr. David Domínguez who prescribed gabapentin and oxycodone, noticed a cut on a toe, applied tar ointment and controlled pain with medications. Pt states no imaging at that time. Pt states 2/2 the pain has been having difficulty getting around and has not been eating, and feels that he needs to stay in the hospital pt reported excruciating pain with increased numbness and black discoloration on toe with severe pain intermittent cold sensation of feet.  No h/o foot wounds, no prior vascular testing, not on AC. pt denies fever or chills, CP or SOB. nausea , vomiting , diarrhea , exposure to dirty water or mosquitoes bites.         Hospital Course:  Upon arrival in the emergency department, vascular was consulted to evaluate blood flow to the extremities and recommended LESLIE/PVR.  Podiatry was consulted to evaluated the ulcer on the second toe and recommended LESLIE/PVR with local wound care consisting of Betadine dsd. Infection disease was consulted to manage cellulitis of the leg and foot and recommended choice of IV antibiotics.  Endocrinology was consulted to assisted in stabilizing blood sugars.         Vascular surgery performed an angiogram which was unsuccessful. Patient had rizzo placed in OR and was noticed to have hematuria. Patient was also started on heparin drip.    Hematuria resolved and rizzo was removed.         Vascular recommended         NOTE NOT COMPLETE Patient is a 79 year old Telugu speaking male from home lives alone ambulates with cane at baseline with PMH of DM, HTN and PSH of open heart surgery, who presents with toe pain. Reports R 2nd toe pain and wound since tripping and falling on it at [employer] on October 29th while wearing slippers. Saw PMD Dr. David Domínguez who prescribed gabapentin and oxycodone, noticed a cut on a toe, applied tar ointment and controlled pain with medications. Pt states no imaging at that time. Pt states 2/2 the pain has been having difficulty getting around and has not been eating, and feels that he needs to stay in the hospital pt reported excruciating pain with increased numbness and black discoloration on toe with severe pain intermittent cold sensation of feet.  No h/o foot wounds, no prior vascular testing, not on AC. pt denies fever or chills, CP or SOB. nausea , vomiting , diarrhea , exposure to dirty water or mosquitoes bites.         Hospital Course:  Upon arrival in the emergency department, vascular was consulted to evaluate blood flow to the extremities and recommended LESLIE/PVR.  Podiatry was consulted to evaluated the ulcer on the second toe and recommended LESLIE/PVR with local wound care consisting of Betadine dsd. Infection disease was consulted to manage cellulitis of the leg and foot and recommended choice of IV antibiotics.  Endocrinology was consulted to assisted in stabilizing blood sugars.         Vascular surgery performed an angiogram which was unsuccessful. Patient had rizzo placed in OR and was noticed to have hematuria. Patient was also started on heparin drip.     Hematuria resolved and rizzo was removed.  Vascular surgery performed a second angiogram with successful angioplasty. Patient was started on Plavix and Aspirin.  Podiatric Surgery performed a right partial 2nd ray amputation with minimal blood loss noted during surgery. Both vascular surgery and podiatric surgery indicate patient is at risk for further amputation in the future.            NOTE NOT COMPLETE Patient is a 79 year old Vietnamese speaking male from home lives alone ambulates with cane at baseline with PMH of DM, HTN and PSH of open heart surgery, who presents with toe pain. Reports R 2nd toe pain and wound since tripping and falling on it at [employer] on October 29th while wearing slippers. Saw PMD Dr. David Domínguez who prescribed gabapentin and oxycodone, noticed a cut on a toe, applied tar ointment and controlled pain with medications. Pt states no imaging at that time. Pt states 2/2 the pain has been having difficulty getting around and has not been eating, and feels that he needs to stay in the hospital pt reported excruciating pain with increased numbness and black discoloration on toe with severe pain intermittent cold sensation of feet.  No h/o foot wounds, no prior vascular testing, not on AC. pt denies fever or chills, CP or SOB. nausea , vomiting , diarrhea , exposure to dirty water or mosquitoes bites.         Hospital Course:  Upon arrival in the emergency department, vascular was consulted to evaluate blood flow to the extremities and recommended LESLIE/PVR.  Podiatry was consulted to evaluated the ulcer on the second toe and recommended LESLIE/PVR with local wound care consisting of Betadine dsd. Infection disease was consulted to manage cellulitis of the leg and foot and recommended choice of IV antibiotics.  Endocrinology was consulted to assisted in stabilizing blood sugars.         Vascular surgery performed an angiogram which was initially unsuccessful. Patient had rizzo placed in OR and was noticed to have hematuria. Patient was also started on heparin drip.     Hematuria resolved and rizzo was removed.  Vascular surgery performed a second angiogram with successful angioplasty. Patient was started on Plavix and Aspirin.  Podiatric Surgery performed a right partial 2nd ray amputation with minimal blood loss noted during surgery. Both vascular surgery and podiatric surgery indicate patient is at risk for further amputation in the future.              NOTE NOT COMPLETE Patient is a 79 year old Georgian speaking male from home lives alone ambulates with cane at baseline with PMH of DM, HTN and PSH of open heart surgery, who presents with toe pain. Reports R 2nd toe pain and wound since tripping and falling on it at [employer] on October 29th while wearing slippers. Saw PMD Dr. David Domínguez who prescribed gabapentin and oxycodone, noticed a cut on a toe, applied tar ointment and controlled pain with medications. Pt states no imaging at that time. Pt states 2/2 the pain has been having difficulty getting around and has not been eating, and feels that he needs to stay in the hospital pt reported excruciating pain with increased numbness and black discoloration on toe with severe pain intermittent cold sensation of feet.  No h/o foot wounds, no prior vascular testing, not on AC. pt denies fever or chills, CP or SOB. nausea , vomiting , diarrhea , exposure to dirty water or mosquitoes bites.         Hospital Course:  Upon arrival in the emergency department, vascular was consulted to evaluate blood flow to the extremities and recommended LESLIE/PVR.  Podiatry was consulted to evaluated the ulcer on the second toe and recommended LESLIE/PVR with local wound care consisting of Betadine dsd. Infection disease was consulted to manage cellulitis of the leg and foot and recommended choice of IV antibiotics.  Endocrinology was consulted to assisted in stabilizing blood sugars.         Vascular surgery performed an angiogram which was initially unsuccessful. Patient had rizzo placed in OR and was noticed to have hematuria. Patient was also started on heparin drip.     Hematuria resolved and rizzo was removed.  Vascular surgery performed a second angiogram with successful angioplasty. Patient was started on Plavix and Aspirin.  Podiatric Surgery performed a right partial 2nd ray amputation with minimal blood loss noted during surgery. Both vascular surgery and podiatric surgery indicate patient is at risk for further amputation in the future.

## 2019-11-07 LAB
ALBUMIN SERPL ELPH-MCNC: 2.5 G/DL — LOW (ref 3.5–5)
ALP SERPL-CCNC: 110 U/L — SIGNIFICANT CHANGE UP (ref 40–120)
ALT FLD-CCNC: 37 U/L DA — SIGNIFICANT CHANGE UP (ref 10–60)
ANION GAP SERPL CALC-SCNC: 6 MMOL/L — SIGNIFICANT CHANGE UP (ref 5–17)
APTT BLD: 35.7 SEC — SIGNIFICANT CHANGE UP (ref 27.5–36.3)
AST SERPL-CCNC: 19 U/L — SIGNIFICANT CHANGE UP (ref 10–40)
BASOPHILS # BLD AUTO: 0.01 K/UL — SIGNIFICANT CHANGE UP (ref 0–0.2)
BASOPHILS NFR BLD AUTO: 0.1 % — SIGNIFICANT CHANGE UP (ref 0–2)
BILIRUB SERPL-MCNC: 0.4 MG/DL — SIGNIFICANT CHANGE UP (ref 0.2–1.2)
BUN SERPL-MCNC: 16 MG/DL — SIGNIFICANT CHANGE UP (ref 7–18)
CALCIUM SERPL-MCNC: 8.1 MG/DL — LOW (ref 8.4–10.5)
CHLORIDE SERPL-SCNC: 101 MMOL/L — SIGNIFICANT CHANGE UP (ref 96–108)
CO2 SERPL-SCNC: 29 MMOL/L — SIGNIFICANT CHANGE UP (ref 22–31)
CREAT SERPL-MCNC: 0.71 MG/DL — SIGNIFICANT CHANGE UP (ref 0.5–1.3)
EOSINOPHIL # BLD AUTO: 0.14 K/UL — SIGNIFICANT CHANGE UP (ref 0–0.5)
EOSINOPHIL NFR BLD AUTO: 1.8 % — SIGNIFICANT CHANGE UP (ref 0–6)
GLUCOSE BLDC GLUCOMTR-MCNC: 169 MG/DL — HIGH (ref 70–99)
GLUCOSE BLDC GLUCOMTR-MCNC: 178 MG/DL — HIGH (ref 70–99)
GLUCOSE BLDC GLUCOMTR-MCNC: 235 MG/DL — HIGH (ref 70–99)
GLUCOSE SERPL-MCNC: 213 MG/DL — HIGH (ref 70–99)
HCT VFR BLD CALC: 31.7 % — LOW (ref 39–50)
HCT VFR BLD CALC: 34.9 % — LOW (ref 39–50)
HGB BLD-MCNC: 11 G/DL — LOW (ref 13–17)
HGB BLD-MCNC: 11.9 G/DL — LOW (ref 13–17)
IMM GRANULOCYTES NFR BLD AUTO: 0.3 % — SIGNIFICANT CHANGE UP (ref 0–1.5)
INR BLD: 1.14 RATIO — SIGNIFICANT CHANGE UP (ref 0.88–1.16)
LYMPHOCYTES # BLD AUTO: 1.95 K/UL — SIGNIFICANT CHANGE UP (ref 1–3.3)
LYMPHOCYTES # BLD AUTO: 25 % — SIGNIFICANT CHANGE UP (ref 13–44)
MAGNESIUM SERPL-MCNC: 2.3 MG/DL — SIGNIFICANT CHANGE UP (ref 1.6–2.6)
MCHC RBC-ENTMCNC: 33.2 PG — SIGNIFICANT CHANGE UP (ref 27–34)
MCHC RBC-ENTMCNC: 34.1 GM/DL — SIGNIFICANT CHANGE UP (ref 32–36)
MCHC RBC-ENTMCNC: 34.2 PG — HIGH (ref 27–34)
MCHC RBC-ENTMCNC: 34.7 GM/DL — SIGNIFICANT CHANGE UP (ref 32–36)
MCV RBC AUTO: 97.5 FL — SIGNIFICANT CHANGE UP (ref 80–100)
MCV RBC AUTO: 98.4 FL — SIGNIFICANT CHANGE UP (ref 80–100)
MONOCYTES # BLD AUTO: 0.65 K/UL — SIGNIFICANT CHANGE UP (ref 0–0.9)
MONOCYTES NFR BLD AUTO: 8.3 % — SIGNIFICANT CHANGE UP (ref 2–14)
NEUTROPHILS # BLD AUTO: 5.03 K/UL — SIGNIFICANT CHANGE UP (ref 1.8–7.4)
NEUTROPHILS NFR BLD AUTO: 64.5 % — SIGNIFICANT CHANGE UP (ref 43–77)
NRBC # BLD: 0 /100 WBCS — SIGNIFICANT CHANGE UP (ref 0–0)
NRBC # BLD: 0 /100 WBCS — SIGNIFICANT CHANGE UP (ref 0–0)
PHOSPHATE SERPL-MCNC: 2.4 MG/DL — LOW (ref 2.5–4.5)
PLATELET # BLD AUTO: 264 K/UL — SIGNIFICANT CHANGE UP (ref 150–400)
PLATELET # BLD AUTO: 285 K/UL — SIGNIFICANT CHANGE UP (ref 150–400)
POTASSIUM SERPL-MCNC: 3.8 MMOL/L — SIGNIFICANT CHANGE UP (ref 3.5–5.3)
POTASSIUM SERPL-SCNC: 3.8 MMOL/L — SIGNIFICANT CHANGE UP (ref 3.5–5.3)
PROT SERPL-MCNC: 6.3 G/DL — SIGNIFICANT CHANGE UP (ref 6–8.3)
PROTHROM AB SERPL-ACNC: 12.7 SEC — SIGNIFICANT CHANGE UP (ref 10–12.9)
RBC # BLD: 3.22 M/UL — LOW (ref 4.2–5.8)
RBC # BLD: 3.58 M/UL — LOW (ref 4.2–5.8)
RBC # FLD: 11.9 % — SIGNIFICANT CHANGE UP (ref 10.3–14.5)
RBC # FLD: 11.9 % — SIGNIFICANT CHANGE UP (ref 10.3–14.5)
SODIUM SERPL-SCNC: 136 MMOL/L — SIGNIFICANT CHANGE UP (ref 135–145)
WBC # BLD: 7.8 K/UL — SIGNIFICANT CHANGE UP (ref 3.8–10.5)
WBC # BLD: 8.5 K/UL — SIGNIFICANT CHANGE UP (ref 3.8–10.5)
WBC # FLD AUTO: 7.8 K/UL — SIGNIFICANT CHANGE UP (ref 3.8–10.5)
WBC # FLD AUTO: 8.5 K/UL — SIGNIFICANT CHANGE UP (ref 3.8–10.5)

## 2019-11-07 PROCEDURE — 99232 SBSQ HOSP IP/OBS MODERATE 35: CPT | Mod: GC

## 2019-11-07 PROCEDURE — 75710 ARTERY X-RAYS ARM/LEG: CPT | Mod: 26

## 2019-11-07 PROCEDURE — 36247 INS CATH ABD/L-EXT ART 3RD: CPT

## 2019-11-07 RX ORDER — SODIUM CHLORIDE 9 MG/ML
1000 INJECTION, SOLUTION INTRAVENOUS
Refills: 0 | Status: DISCONTINUED | OUTPATIENT
Start: 2019-11-07 | End: 2019-11-07

## 2019-11-07 RX ORDER — HEPARIN SODIUM 5000 [USP'U]/ML
INJECTION INTRAVENOUS; SUBCUTANEOUS
Qty: 25000 | Refills: 0 | Status: DISCONTINUED | OUTPATIENT
Start: 2019-11-07 | End: 2019-11-12

## 2019-11-07 RX ORDER — OXYCODONE AND ACETAMINOPHEN 5; 325 MG/1; MG/1
2 TABLET ORAL EVERY 6 HOURS
Refills: 0 | Status: DISCONTINUED | OUTPATIENT
Start: 2019-11-07 | End: 2019-11-09

## 2019-11-07 RX ORDER — ACETAMINOPHEN 500 MG
650 TABLET ORAL EVERY 6 HOURS
Refills: 0 | Status: DISCONTINUED | OUTPATIENT
Start: 2019-11-07 | End: 2019-11-09

## 2019-11-07 RX ORDER — SODIUM CHLORIDE 9 MG/ML
75 INJECTION INTRAMUSCULAR; INTRAVENOUS; SUBCUTANEOUS ONCE
Refills: 0 | Status: COMPLETED | OUTPATIENT
Start: 2019-11-07 | End: 2019-11-07

## 2019-11-07 RX ORDER — HYDROMORPHONE HYDROCHLORIDE 2 MG/ML
1 INJECTION INTRAMUSCULAR; INTRAVENOUS; SUBCUTANEOUS
Refills: 0 | Status: DISCONTINUED | OUTPATIENT
Start: 2019-11-07 | End: 2019-11-07

## 2019-11-07 RX ORDER — CLOPIDOGREL BISULFATE 75 MG/1
75 TABLET, FILM COATED ORAL DAILY
Refills: 0 | Status: DISCONTINUED | OUTPATIENT
Start: 2019-11-07 | End: 2019-11-07

## 2019-11-07 RX ORDER — INSULIN LISPRO 100/ML
4 VIAL (ML) SUBCUTANEOUS
Refills: 0 | Status: DISCONTINUED | OUTPATIENT
Start: 2019-11-07 | End: 2019-11-14

## 2019-11-07 RX ORDER — METOPROLOL TARTRATE 50 MG
12.5 TABLET ORAL
Refills: 0 | Status: DISCONTINUED | OUTPATIENT
Start: 2019-11-07 | End: 2019-11-14

## 2019-11-07 RX ORDER — CEFAZOLIN SODIUM 1 G
1000 VIAL (EA) INJECTION EVERY 8 HOURS
Refills: 0 | Status: DISCONTINUED | OUTPATIENT
Start: 2019-11-07 | End: 2019-11-14

## 2019-11-07 RX ORDER — SENNA PLUS 8.6 MG/1
2 TABLET ORAL AT BEDTIME
Refills: 0 | Status: DISCONTINUED | OUTPATIENT
Start: 2019-11-07 | End: 2019-11-14

## 2019-11-07 RX ORDER — SIMVASTATIN 20 MG/1
40 TABLET, FILM COATED ORAL AT BEDTIME
Refills: 0 | Status: DISCONTINUED | OUTPATIENT
Start: 2019-11-07 | End: 2019-11-14

## 2019-11-07 RX ORDER — ENOXAPARIN SODIUM 100 MG/ML
40 INJECTION SUBCUTANEOUS DAILY
Refills: 0 | Status: DISCONTINUED | OUTPATIENT
Start: 2019-11-07 | End: 2019-11-07

## 2019-11-07 RX ORDER — SODIUM CHLORIDE 9 MG/ML
1000 INJECTION INTRAMUSCULAR; INTRAVENOUS; SUBCUTANEOUS
Refills: 0 | Status: DISCONTINUED | OUTPATIENT
Start: 2019-11-07 | End: 2019-11-07

## 2019-11-07 RX ORDER — INSULIN LISPRO 100/ML
VIAL (ML) SUBCUTANEOUS EVERY 6 HOURS
Refills: 0 | Status: DISCONTINUED | OUTPATIENT
Start: 2019-11-07 | End: 2019-11-07

## 2019-11-07 RX ORDER — TAMSULOSIN HYDROCHLORIDE 0.4 MG/1
0.4 CAPSULE ORAL AT BEDTIME
Refills: 0 | Status: DISCONTINUED | OUTPATIENT
Start: 2019-11-07 | End: 2019-11-14

## 2019-11-07 RX ORDER — INSULIN LISPRO 100/ML
VIAL (ML) SUBCUTANEOUS AT BEDTIME
Refills: 0 | Status: DISCONTINUED | OUTPATIENT
Start: 2019-11-07 | End: 2019-11-14

## 2019-11-07 RX ORDER — HYDROMORPHONE HYDROCHLORIDE 2 MG/ML
0.5 INJECTION INTRAMUSCULAR; INTRAVENOUS; SUBCUTANEOUS
Refills: 0 | Status: DISCONTINUED | OUTPATIENT
Start: 2019-11-07 | End: 2019-11-07

## 2019-11-07 RX ORDER — INSULIN LISPRO 100/ML
VIAL (ML) SUBCUTANEOUS
Refills: 0 | Status: DISCONTINUED | OUTPATIENT
Start: 2019-11-07 | End: 2019-11-14

## 2019-11-07 RX ORDER — OXYCODONE AND ACETAMINOPHEN 5; 325 MG/1; MG/1
1 TABLET ORAL EVERY 6 HOURS
Refills: 0 | Status: DISCONTINUED | OUTPATIENT
Start: 2019-11-07 | End: 2019-11-09

## 2019-11-07 RX ORDER — INSULIN GLARGINE 100 [IU]/ML
11 INJECTION, SOLUTION SUBCUTANEOUS AT BEDTIME
Refills: 0 | Status: DISCONTINUED | OUTPATIENT
Start: 2019-11-07 | End: 2019-11-11

## 2019-11-07 RX ADMIN — SIMVASTATIN 40 MILLIGRAM(S): 20 TABLET, FILM COATED ORAL at 21:47

## 2019-11-07 RX ADMIN — Medication 12.5 MILLIGRAM(S): at 05:48

## 2019-11-07 RX ADMIN — OXYCODONE AND ACETAMINOPHEN 1 TABLET(S): 5; 325 TABLET ORAL at 23:10

## 2019-11-07 RX ADMIN — Medication 1: at 21:47

## 2019-11-07 RX ADMIN — LOSARTAN POTASSIUM 50 MILLIGRAM(S): 100 TABLET, FILM COATED ORAL at 05:48

## 2019-11-07 RX ADMIN — Medication 650 MILLIGRAM(S): at 20:38

## 2019-11-07 RX ADMIN — OXYCODONE AND ACETAMINOPHEN 2 TABLET(S): 5; 325 TABLET ORAL at 18:33

## 2019-11-07 RX ADMIN — SODIUM CHLORIDE 5 MILLILITER(S): 9 INJECTION INTRAMUSCULAR; INTRAVENOUS; SUBCUTANEOUS at 21:04

## 2019-11-07 RX ADMIN — OXYCODONE AND ACETAMINOPHEN 2 TABLET(S): 5; 325 TABLET ORAL at 19:28

## 2019-11-07 RX ADMIN — Medication 5 MILLIGRAM(S): at 21:47

## 2019-11-07 RX ADMIN — INSULIN GLARGINE 11 UNIT(S): 100 INJECTION, SOLUTION SUBCUTANEOUS at 21:46

## 2019-11-07 RX ADMIN — Medication 100 MILLIGRAM(S): at 22:07

## 2019-11-07 RX ADMIN — Medication 12.5 MILLIGRAM(S): at 18:28

## 2019-11-07 RX ADMIN — TAMSULOSIN HYDROCHLORIDE 0.4 MILLIGRAM(S): 0.4 CAPSULE ORAL at 21:47

## 2019-11-07 RX ADMIN — SODIUM CHLORIDE 70 MILLILITER(S): 9 INJECTION INTRAMUSCULAR; INTRAVENOUS; SUBCUTANEOUS at 01:33

## 2019-11-07 RX ADMIN — HEPARIN SODIUM 1100 UNIT(S)/HR: 5000 INJECTION INTRAVENOUS; SUBCUTANEOUS at 23:22

## 2019-11-07 RX ADMIN — Medication 2: at 08:42

## 2019-11-07 RX ADMIN — Medication 100 MILLIGRAM(S): at 05:48

## 2019-11-07 RX ADMIN — OXYCODONE AND ACETAMINOPHEN 1 TABLET(S): 5; 325 TABLET ORAL at 22:10

## 2019-11-07 RX ADMIN — SENNA PLUS 2 TABLET(S): 8.6 TABLET ORAL at 21:47

## 2019-11-07 RX ADMIN — Medication 650 MILLIGRAM(S): at 21:55

## 2019-11-07 NOTE — PROGRESS NOTE ADULT - ASSESSMENT
79 year old Luxembourgish speaking male from home lives alone ambulates with cane at baseline with PMH of DM, HTN and PSH open heart surgery presents with toe pain. Reports R 2nd toe pain and wound since tripping and falling on it at [employer] on october 29th while wearing slippers . Saw PMD Dr. David Domínguez who prescribed gabapentin and oxycodone, noticed a cut on a toe, applied tar ointment and controlled pain with medications. pt states no imaging at that time. pt states 2/2 the pain has been having difficulty getting around and has not been eating, and feels that he needs to stay in the hospital pt reported excruciating pain with increased numbness and black discoloration on toe with severe pain intermittent cold sensation of feet.  No h/o foot wounds, no prior vascular testing, not on AC. pt denies fever or chills, CP or SOB. nausea , vomiting , diarrhea , exposure to dirty water or mosquitoes bites.     Pt is FULL CODE.  pt is admitted for management of Cellulitis

## 2019-11-07 NOTE — PROGRESS NOTE ADULT - ATTENDING COMMENTS
Patient seen/evaluated at bedside post intervention 11/7/19. I agree with the resident progress note/outlined plan of care. My independent findings and conclusions are documented.    Somewhat sleepy at time of assessment.  Case discussed with Dr. Marte, technically difficult case. No stents placed. Pt is high risk to proceed to limb amputation.  Rizzo catheter was placed in the OR. Advised by vasculary surgery to initiate heparin gtt 6 hours post procedure.  Called to bedside later in the evening by RN for gross hematuria. On assessment, no clots noted, small amount of hematuria  started on IVF hydration. Case d/w overnight staff including Pgy3 Dr. Delacruz. Will monitor for ongoing hematuria. If not significant, start heparin gtt. I have discontinued th plavix for now. If hematuria worsens/progresses would need placement of CBI system  dc rizzo catheter in am of 11/8/19 if gross hematuria resolved.

## 2019-11-07 NOTE — PROGRESS NOTE ADULT - PROBLEM SELECTOR PLAN 2
- Home meds recovered from outside pharmacy  - Lantus dose decreased 50% last night for NPO today,   - Monitor blood sugars AC/HS and adjust as needed  - goal -180.   - Carbohydrate consistent diabetic diet with evening snacks.  -Endocrine Consult reviewed

## 2019-11-07 NOTE — PROGRESS NOTE ADULT - PROBLEM SELECTOR PLAN 4
- Home meds recovered from pharmacy  - will continue Lisinopril  for now with parameters (given hx of DM)  - Monitor BP closely and adjust medications if clinically indicated.  - DASH diet.

## 2019-11-07 NOTE — PROGRESS NOTE ADULT - SUBJECTIVE AND OBJECTIVE BOX
YEOM, CHANG  79y  Male      Patient is a 79y old  Male who presents with a chief complaint of 2nd right toe pain (06 Nov 2019 17:29)      INTERVAL HPI/OVERNIGHT EVENTS: Patient states he was unable to sleep because of pain in his right leg.  He states the pain medication minimally relieves the symptoms.  Patient understands he will not eat or drink today.        REVIEW OF SYSTEMS:  CONSTITUTIONAL: No fever, weight loss, or fatigue  EYES: No eye pain, visual disturbances, or discharge  ENMT:  No difficulty hearing, tinnitus, vertigo; No sinus or throat pain  NECK: No pain or stiffness  RESPIRATORY: No cough, wheezing, chills or hemoptysis; No shortness of breath  CARDIOVASCULAR: No chest pain, palpitations, dizziness, or leg swelling  GASTROINTESTINAL: No abdominal or epigastric pain. No nausea, vomiting, or hematemesis; No diarrhea or constipation.  GENITOURINARY: No dysuria, frequency, hematuria, or incontinence  NEUROLOGICAL: No headaches, memory loss, loss of strength, numbness, or tremors  SKIN: Erythema to right 2nd toe and dorsal foot  LYMPH NODES: No enlarged glands  ENDOCRINE: No heat or cold intolerance; No hair loss  MUSCULOSKELETAL: Pain in right lower extremity  PSYCHIATRIC: difficulty sleeping due to pain  HEME/LYMPH: No easy bruising, or bleeding gums  ALLERY AND IMMUNOLOGIC: No hives or eczema  FAMILY HISTORY:    T(C): 36.8 (11-07-19 @ 00:08), Max: 36.8 (11-07-19 @ 00:08)  HR: 74 (11-07-19 @ 05:31) (72 - 74)  BP: 131/61 (11-07-19 @ 05:31) (100/52 - 139/61)  RR: 16 (11-07-19 @ 00:08) (16 - 16)  SpO2: 95% (11-07-19 @ 00:08) (95% - 96%)  Wt(kg): --Vital Signs Last 24 Hrs  T(C): 36.8 (07 Nov 2019 00:08), Max: 36.8 (07 Nov 2019 00:08)  T(F): 98.3 (07 Nov 2019 00:08), Max: 98.3 (07 Nov 2019 00:08)  HR: 74 (07 Nov 2019 05:31) (72 - 74)  BP: 131/61 (07 Nov 2019 05:31) (100/52 - 139/61)  BP(mean): --  RR: 16 (07 Nov 2019 00:08) (16 - 16)  SpO2: 95% (07 Nov 2019 00:08) (95% - 96%)  No Known Allergies      PHYSICAL EXAM:  GENERAL: NAD, well-groomed, well-developed  HEAD:  Atraumatic, Normocephalic  EYES: EOMI, PERRLA, conjunctiva and sclera clear  ENMT:  Moist mucous membranes  NECK: Supple, No JVD, Normal thyroid  NERVOUS SYSTEM:  Alert & Oriented X3, Good concentration; Motor Strength 5/5 B/L upper and lower extremities;   CHEST/LUNG: Clear to percussion bilaterally; No rales, rhonchi, wheezing, or rubs  HEART: Regular rate and rhythm; No murmurs, rubs, or gallops  ABDOMEN: Soft, Nontender, Nondistended; Bowel sounds present  EXTREMITIES:  2+ Peripheral Pulses, No clubbing, cyanosis, or edema  LYMPH: No lymphadenopathy noted  SKIN: Erythema right dorsal foot, edema right 2nd toe, hemorrhagic blister distal right toe, Wound plantar aspect right 2nd toe      Consultant(s) Notes Reviewed:  [x ] YES  [ ] NO  Care Discussed with Consultants/Other Providers [ x] YES  [ ] NO    LABS:      RADIOLOGY & ADDITIONAL TESTS:    Imaging Personally Reviewed:  [x ] YES  [ ] NO  acetaminophen   Tablet .. 650 milliGRAM(s) Oral every 6 hours PRN  bisacodyl 5 milliGRAM(s) Oral at bedtime  ceFAZolin   IVPB      ceFAZolin   IVPB 1000 milliGRAM(s) IV Intermittent every 8 hours  chlorhexidine 4% Liquid 1 Application(s) Topical once  influenza   Vaccine 0.5 milliLiter(s) IntraMuscular once  insulin glargine Injectable (LANTUS) 5 Unit(s) SubCutaneous at bedtime  insulin lispro (HumaLOG) corrective regimen sliding scale   SubCutaneous three times a day before meals  insulin lispro (HumaLOG) corrective regimen sliding scale   SubCutaneous at bedtime  insulin lispro Injectable (HumaLOG) 4 Unit(s) SubCutaneous three times a day before meals  melatonin 1 milliGRAM(s) Oral at bedtime  metoprolol tartrate 12.5 milliGRAM(s) Oral two times a day  oxycodone    5 mG/acetaminophen 325 mG 1 Tablet(s) Oral every 6 hours PRN  oxycodone    5 mG/acetaminophen 325 mG 2 Tablet(s) Oral every 6 hours PRN  senna 2 Tablet(s) Oral at bedtime  simvastatin 40 milliGRAM(s) Oral at bedtime  sodium chloride 0.9%. 1000 milliLiter(s) IV Continuous <Continuous>  tamsulosin 0.4 milliGRAM(s) Oral at bedtime      HEALTH ISSUES - PROBLEM Dx:  Dehydration  Hyperglycemia  Dehydration  Dehydration  Goals of care, counseling/discussion: Goals of care, counseling/discussion  Prophylactic measure: Prophylactic measure  Cellulitis of toe of right foot: Cellulitis of toe of right foot  PVD (peripheral vascular disease): PVD (peripheral vascular disease)  Diabetes: Diabetes  HTN (hypertension): HTN (hypertension)

## 2019-11-07 NOTE — PROGRESS NOTE ADULT - PROBLEM SELECTOR PLAN 1
- p/w right 2nd Toe and Cellulitis with erythema, swelling, distally purple  - warm to touch with some tenderness and cool at the tip   - s/p Vanco adn Zosyn in ED  - No Leukocytosis   - Afebrile   - c/w vanco and Zosyn   - Recommended right leg elevation to the patient  - x-ray right foot : no osteomyelitis of toes   - LESLIE reviewed- 0.71 on the right foot, moderate   - podiatry consult reviewed- no intervention  - ID consulted reviewed-

## 2019-11-07 NOTE — PROGRESS NOTE ADULT - PROBLEM SELECTOR PLAN 3
- Patient NPO for angiogram today  -vascular consult reviewed  -plan for angiogram (+/- intervention) 11/7   -LESLIE reviewed- 0.71 on the right foot, moderate   medical clearance  -EKG 11/4: normal sinus rhythm, left axis deviation, left ventricular hypertrophy with repolarization abnormality  -RCRI score 3 points for 15% risk   -Echo ordered

## 2019-11-07 NOTE — PROGRESS NOTE ADULT - SUBJECTIVE AND OBJECTIVE BOX
Clinically stable  Plan angio today +/- intervention  Informed consent obtained via tel interprter and Wolof speaking HCP

## 2019-11-08 LAB
ANION GAP SERPL CALC-SCNC: 5 MMOL/L — SIGNIFICANT CHANGE UP (ref 5–17)
APTT BLD: 140.5 SEC — CRITICAL HIGH (ref 27.5–36.3)
APTT BLD: 73.7 SEC — HIGH (ref 27.5–36.3)
APTT BLD: 83.9 SEC — HIGH (ref 27.5–36.3)
BASOPHILS # BLD AUTO: 0.02 K/UL — SIGNIFICANT CHANGE UP (ref 0–0.2)
BASOPHILS NFR BLD AUTO: 0.3 % — SIGNIFICANT CHANGE UP (ref 0–2)
BUN SERPL-MCNC: 12 MG/DL — SIGNIFICANT CHANGE UP (ref 7–18)
CALCIUM SERPL-MCNC: 8 MG/DL — LOW (ref 8.4–10.5)
CHLORIDE SERPL-SCNC: 106 MMOL/L — SIGNIFICANT CHANGE UP (ref 96–108)
CO2 SERPL-SCNC: 28 MMOL/L — SIGNIFICANT CHANGE UP (ref 22–31)
CREAT SERPL-MCNC: 0.56 MG/DL — SIGNIFICANT CHANGE UP (ref 0.5–1.3)
EOSINOPHIL # BLD AUTO: 0.14 K/UL — SIGNIFICANT CHANGE UP (ref 0–0.5)
EOSINOPHIL NFR BLD AUTO: 1.8 % — SIGNIFICANT CHANGE UP (ref 0–6)
GLUCOSE BLDC GLUCOMTR-MCNC: 123 MG/DL — HIGH (ref 70–99)
GLUCOSE BLDC GLUCOMTR-MCNC: 137 MG/DL — HIGH (ref 70–99)
GLUCOSE BLDC GLUCOMTR-MCNC: 229 MG/DL — HIGH (ref 70–99)
GLUCOSE BLDC GLUCOMTR-MCNC: 248 MG/DL — HIGH (ref 70–99)
GLUCOSE SERPL-MCNC: 117 MG/DL — HIGH (ref 70–99)
HCT VFR BLD CALC: 30.6 % — LOW (ref 39–50)
HCT VFR BLD CALC: 30.8 % — LOW (ref 39–50)
HGB BLD-MCNC: 10.3 G/DL — LOW (ref 13–17)
HGB BLD-MCNC: 10.7 G/DL — LOW (ref 13–17)
IMM GRANULOCYTES NFR BLD AUTO: 0.3 % — SIGNIFICANT CHANGE UP (ref 0–1.5)
LYMPHOCYTES # BLD AUTO: 2.24 K/UL — SIGNIFICANT CHANGE UP (ref 1–3.3)
LYMPHOCYTES # BLD AUTO: 29.4 % — SIGNIFICANT CHANGE UP (ref 13–44)
MAGNESIUM SERPL-MCNC: 2.1 MG/DL — SIGNIFICANT CHANGE UP (ref 1.6–2.6)
MCHC RBC-ENTMCNC: 33.3 PG — SIGNIFICANT CHANGE UP (ref 27–34)
MCHC RBC-ENTMCNC: 33.7 GM/DL — SIGNIFICANT CHANGE UP (ref 32–36)
MCHC RBC-ENTMCNC: 34 PG — SIGNIFICANT CHANGE UP (ref 27–34)
MCHC RBC-ENTMCNC: 34.7 GM/DL — SIGNIFICANT CHANGE UP (ref 32–36)
MCV RBC AUTO: 97.8 FL — SIGNIFICANT CHANGE UP (ref 80–100)
MCV RBC AUTO: 99 FL — SIGNIFICANT CHANGE UP (ref 80–100)
MONOCYTES # BLD AUTO: 0.6 K/UL — SIGNIFICANT CHANGE UP (ref 0–0.9)
MONOCYTES NFR BLD AUTO: 7.9 % — SIGNIFICANT CHANGE UP (ref 2–14)
NEUTROPHILS # BLD AUTO: 4.6 K/UL — SIGNIFICANT CHANGE UP (ref 1.8–7.4)
NEUTROPHILS NFR BLD AUTO: 60.3 % — SIGNIFICANT CHANGE UP (ref 43–77)
NRBC # BLD: 0 /100 WBCS — SIGNIFICANT CHANGE UP (ref 0–0)
NRBC # BLD: 0 /100 WBCS — SIGNIFICANT CHANGE UP (ref 0–0)
PHOSPHATE SERPL-MCNC: 2.6 MG/DL — SIGNIFICANT CHANGE UP (ref 2.5–4.5)
PLATELET # BLD AUTO: 252 K/UL — SIGNIFICANT CHANGE UP (ref 150–400)
PLATELET # BLD AUTO: 268 K/UL — SIGNIFICANT CHANGE UP (ref 150–400)
POTASSIUM SERPL-MCNC: 3.7 MMOL/L — SIGNIFICANT CHANGE UP (ref 3.5–5.3)
POTASSIUM SERPL-SCNC: 3.7 MMOL/L — SIGNIFICANT CHANGE UP (ref 3.5–5.3)
RBC # BLD: 3.09 M/UL — LOW (ref 4.2–5.8)
RBC # BLD: 3.15 M/UL — LOW (ref 4.2–5.8)
RBC # FLD: 11.9 % — SIGNIFICANT CHANGE UP (ref 10.3–14.5)
RBC # FLD: 12 % — SIGNIFICANT CHANGE UP (ref 10.3–14.5)
SODIUM SERPL-SCNC: 139 MMOL/L — SIGNIFICANT CHANGE UP (ref 135–145)
WBC # BLD: 7.62 K/UL — SIGNIFICANT CHANGE UP (ref 3.8–10.5)
WBC # BLD: 7.82 K/UL — SIGNIFICANT CHANGE UP (ref 3.8–10.5)
WBC # FLD AUTO: 7.62 K/UL — SIGNIFICANT CHANGE UP (ref 3.8–10.5)
WBC # FLD AUTO: 7.82 K/UL — SIGNIFICANT CHANGE UP (ref 3.8–10.5)

## 2019-11-08 PROCEDURE — 99231 SBSQ HOSP IP/OBS SF/LOW 25: CPT

## 2019-11-08 PROCEDURE — 99232 SBSQ HOSP IP/OBS MODERATE 35: CPT | Mod: GC

## 2019-11-08 RX ORDER — LIDOCAINE 4 G/100G
1 CREAM TOPICAL DAILY
Refills: 0 | Status: DISCONTINUED | OUTPATIENT
Start: 2019-11-08 | End: 2019-11-14

## 2019-11-08 RX ADMIN — HEPARIN SODIUM 900 UNIT(S)/HR: 5000 INJECTION INTRAVENOUS; SUBCUTANEOUS at 12:35

## 2019-11-08 RX ADMIN — Medication 100 MILLIGRAM(S): at 14:44

## 2019-11-08 RX ADMIN — OXYCODONE AND ACETAMINOPHEN 2 TABLET(S): 5; 325 TABLET ORAL at 16:06

## 2019-11-08 RX ADMIN — OXYCODONE AND ACETAMINOPHEN 2 TABLET(S): 5; 325 TABLET ORAL at 17:00

## 2019-11-08 RX ADMIN — OXYCODONE AND ACETAMINOPHEN 2 TABLET(S): 5; 325 TABLET ORAL at 01:55

## 2019-11-08 RX ADMIN — Medication 2: at 21:46

## 2019-11-08 RX ADMIN — INSULIN GLARGINE 11 UNIT(S): 100 INJECTION, SOLUTION SUBCUTANEOUS at 21:45

## 2019-11-08 RX ADMIN — OXYCODONE AND ACETAMINOPHEN 2 TABLET(S): 5; 325 TABLET ORAL at 08:50

## 2019-11-08 RX ADMIN — Medication 4 UNIT(S): at 11:58

## 2019-11-08 RX ADMIN — Medication 100 MILLIGRAM(S): at 21:46

## 2019-11-08 RX ADMIN — Medication 650 MILLIGRAM(S): at 21:48

## 2019-11-08 RX ADMIN — Medication 5 MILLIGRAM(S): at 21:45

## 2019-11-08 RX ADMIN — Medication 650 MILLIGRAM(S): at 22:30

## 2019-11-08 RX ADMIN — LIDOCAINE 1 PATCH: 4 CREAM TOPICAL at 21:45

## 2019-11-08 RX ADMIN — SENNA PLUS 2 TABLET(S): 8.6 TABLET ORAL at 21:45

## 2019-11-08 RX ADMIN — Medication 4 UNIT(S): at 08:31

## 2019-11-08 RX ADMIN — OXYCODONE AND ACETAMINOPHEN 2 TABLET(S): 5; 325 TABLET ORAL at 00:49

## 2019-11-08 RX ADMIN — Medication 100 MILLIGRAM(S): at 05:58

## 2019-11-08 RX ADMIN — TAMSULOSIN HYDROCHLORIDE 0.4 MILLIGRAM(S): 0.4 CAPSULE ORAL at 21:45

## 2019-11-08 RX ADMIN — Medication 12.5 MILLIGRAM(S): at 05:57

## 2019-11-08 RX ADMIN — SIMVASTATIN 40 MILLIGRAM(S): 20 TABLET, FILM COATED ORAL at 21:45

## 2019-11-08 RX ADMIN — HEPARIN SODIUM 0 UNIT(S)/HR: 5000 INJECTION INTRAVENOUS; SUBCUTANEOUS at 11:39

## 2019-11-08 RX ADMIN — Medication 12.5 MILLIGRAM(S): at 17:22

## 2019-11-08 RX ADMIN — Medication 4 UNIT(S): at 17:22

## 2019-11-08 RX ADMIN — OXYCODONE AND ACETAMINOPHEN 2 TABLET(S): 5; 325 TABLET ORAL at 07:59

## 2019-11-08 RX ADMIN — HEPARIN SODIUM 1100 UNIT(S)/HR: 5000 INJECTION INTRAVENOUS; SUBCUTANEOUS at 05:36

## 2019-11-08 RX ADMIN — HEPARIN SODIUM 900 UNIT(S)/HR: 5000 INJECTION INTRAVENOUS; SUBCUTANEOUS at 18:47

## 2019-11-08 RX ADMIN — Medication 4: at 11:58

## 2019-11-08 NOTE — PROGRESS NOTE ADULT - ATTENDING COMMENTS
Patient seen/evaluated at bedside 11/8/2019. I agree with the resident progress note/outlined plan of care. My independent findings and conclusions are documented.    Yoruba  [id number]    pt c/o persistent right foot pain, worse at 2nd digit, though it is improved when compared to yesterday. Gross hematuria from overnight has resolved. s/p gentle IVF hydration. Plam catheter is now out (since 12 pm) w/ trial of void in progress    AAOx3, non toxic appearing/NAD  right foot: slight extension erythema at right foot extending to right second toe + tender to palpation  R 2nd toe tip necrosis w/ edema, w/ 0.6 cm plantar surface ulcer    ABIs: Right LESLIE compatible with moderate peripheral vascular disease. Segmental   disease within the right calf. Normal left LESLIE.    EKG on admission: NSR at 71 BMP, LVH w/ early repolarization pattern , TWI in V2-V6    1. right toe/foot cellulitis  2. 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 DM T II w/ severe hyperglycemia (hgbA1c>10)  4. gross hematuria (resolved)  5. HTN  6. PAD with threatened limb  7. CAD h/o CABG  8. BPH    s/p post unsuccessful angiogram yesterday w/ technically difficult case  tentatively on the OR schedule for Tuesday for potential TMA vs. BKA  case discussed with vascular surgery, will continue with heparin gtt through the weekend  pain management, lidoderm patch, percocet  lantus dose increased back to 11 units w lispro of 5 unit tid, diabetic/low cholesterol diet  trial of void in progress  resume ARB tomorrow at lower dose w/ holding parameters, c/w beta blocker, statin  as pt now on heparin gtt, can likely defer plavix in setting of tentatively planned intervention--> will clarify w/ vasculary as typically may maintain on Plavix during vascular procedures, however in light of heparin infusion may have to defer  will have cardiology asssess prior to procedure on Tuesday Patient seen/evaluated at bedside 11/8/2019. I agree with the resident progress note/outlined plan of care. My independent findings and conclusions are documented.    [place]ian  [id number]    pt c/o persistent right foot pain, worse at 2nd digit, though it is improved when compared to yesterday. Gross hematuria from overnight has resolved. s/p gentle IVF hydration. Palm catheter is now out (since 12 pm) w/ trial of void in progress    AAOx3, non toxic appearing/NAD  right foot: slight extension erythema at right foot extending to right second toe + tender to palpation  R 2nd toe tip necrosis w/ edema, w/ 0.6 cm plantar surface ulcer    ABIs: Right LESLIE compatible with moderate peripheral vascular disease. Segmental   disease within the right calf. Normal left LESLIE.    EKG on admission: NSR at 71 BMP, LVH w/ early repolarization pattern , TWI in V2-V6    1. right toe/foot cellulitis  2. right 2nd distal toe necrosis w/ plantar surface ulcer s/p angiogram  3. DM T II w/ severe hyperglycemia (hgbA1c>10)  4. gross hematuria (resolved)  5. HTN  6. PAD with threatened limb  7. CAD h/o CABG  8. BPH    s/p post unsuccessful angiogram yesterday w/ technically difficult case  tentatively on the OR schedule for Tuesday for potential TMA vs. BKA  case discussed with vascular surgery, will continue with heparin gtt through the weekend  pain management, lidoderm patch, percocet  lantus dose increased back to 11 units w lispro of 5 unit tid, diabetic/low cholesterol diet  trial of void in progress  resume ARB tomorrow at lower dose w/ holding parameters, c/w beta blocker, statin  as pt now on heparin gtt, can likely defer plavix in setting of tentatively planned intervention--> will clarify w/ vasculary as typically may maintain on Plavix during vascular procedures, however in light of heparin infusion may have to defer  will have cardiology asssess prior to procedure on Tuesday

## 2019-11-08 NOTE — PROGRESS NOTE ADULT - SUBJECTIVE AND OBJECTIVE BOX
Vascular Surgery    Subjective:  Pt resting comfortably. c/o R foot pain similar to pre-op complaint  Tolerating diet  Resident reported hematuria upon starting heparin drip post op. Palm catheter placed.    T(C): 36.6 (11-07-19 @ 23:32), Max: 36.7 (11-07-19 @ 17:24)  HR: 60 (11-07-19 @ 23:32) (60 - 74)  BP: 120/60 (11-07-19 @ 23:32) (116/63 - 156/55)  RR: 16 (11-07-19 @ 23:32) (14 - 20)  SpO2: 98% (11-07-19 @ 23:32) (95% - 98%)    Physical:  Gen: NAD  RLE: Warm, sensation intact. Popliteal pulse intact. Nonpalpable distal pulses. Stable necrosis of 3rd toe. Groin Dressing C/D/I without surrounding fluctuance, induration or skin discoloration.     79y.o. Male s/p angiogram, unsuccessful angioplasty    -Pain control prn  -Hep drip  -Palm catheter  -Medical optimization for further surgical intervention, bypass vs BKA. Will need to discuss next surgical options with patient.

## 2019-11-08 NOTE — PROGRESS NOTE ADULT - PROBLEM SELECTOR PLAN 3
-vascular consult reviewed  -angiogram done yesterday; unsuccessful angioplasty   -LESLIE reviewed- 0.71 on the right foot, moderate   -plan for vein mapping today

## 2019-11-08 NOTE — PROGRESS NOTE ADULT - PROBLEM SELECTOR PLAN 2
- Monitor blood sugars AC/HS and adjust as needed  - goal -180.   - Carbohydrate consistent diabetic diet with evening snacks.  -Endocrine Consult reviewed

## 2019-11-08 NOTE — PROGRESS NOTE ADULT - ASSESSMENT
79 year old German speaking male from home with PMH of type 2 DM, CAD s/p CABG, and HTN presented with complaint of right toe pain. Endocrinology was consulted for management of uncontrolled type 2 DM with hyperglycemia, A1c 10.9%.     1. Uncontrolled type 2 DM with hyperglycemia   -BG overall at goal with few excursions >200, patient reporting poor appetite   -continue Lantus 11 units at bedtime  -start Humalog 4 units TID with meals  -recommend to continue moderate dose mealtime rapid acting insulin as below  BG 70-100mg/dL 0 units  -150 mg/dL 0 units  -200 mg/dL 2 units  -250 mg/dL 4 units  -300 mg/dL 6 units  -350 mg/dL 8 units  -400 mg/dL 10 units  BG > 400mg/dL 12 units  -FS AC HS  -ensure consistent carbohydrate diet   -will monitor FS and adjust accordingly   --if patient is NPO for any reason please change FS and sliding scale to NPO lispro scale     2. Right second toe wound  -wound care as per podiatry  -s/p unsuccessful angioplasty yesterday  -Vascular surgery considering bypass vs BKA.  -continue antibiotics as per ID  -optimize glycemic control    3. HTN  -BP at goal  -continue losartan and metoprolol    Plan discussed with primary team  Please  call  w/ any questions or concerns [phone] 79 year old Yi speaking male from home with PMH of type 2 DM, CAD s/p CABG, and HTN presented with complaint of right toe pain. Endocrinology was consulted for management of uncontrolled type 2 DM with hyperglycemia, A1c 10.9%.     1. Uncontrolled type 2 DM with hyperglycemia   -BG overall at goal with few excursions >200, patient reporting poor appetite   -continue Lantus 11 units at bedtime  -continue Humalog 4 units TID with meals  -recommend to continue moderate dose mealtime rapid acting insulin as below  BG 70-100mg/dL 0 units  -150 mg/dL 0 units  -200 mg/dL 2 units  -250 mg/dL 4 units  -300 mg/dL 6 units  -350 mg/dL 8 units  -400 mg/dL 10 units  BG > 400mg/dL 12 units  -FS AC HS  -ensure consistent carbohydrate diet   -will monitor FS and adjust accordingly   --if patient is NPO for any reason please change FS and sliding scale to NPO lispro scale     2. Right second toe wound  -wound care as per podiatry  -s/p unsuccessful angioplasty yesterday  -Vascular surgery considering bypass vs BKA.  -continue antibiotics as per ID  -optimize glycemic control    3. HTN  -BP at goal  -continue losartan and metoprolol    Plan discussed with primary team  Please  call  w/ any questions or concerns [phone]

## 2019-11-08 NOTE — PROGRESS NOTE ADULT - PROBLEM SELECTOR PLAN 5
IMPROVE VTE Individual Risk Assessment    RISK                                                                Points    [  ] Previous VTE                                                  3    [  ] Thrombophilia                                               2    [  ] Lower limb paralysis                                      2        (unable to hold up >15 seconds)    [  ] Current Cancer                                              2         (within 6 months)  [X] Immobilization > 24 hrs                                1  [  ] ICU/CCU stay > 24 hours                              1  [X] Age > 60                                                      1    IMPROVE VTE Score _____2____  c/w heparin drip

## 2019-11-08 NOTE — PROGRESS NOTE ADULT - SUBJECTIVE AND OBJECTIVE BOX
79 year old Polish speaking male from home with PMH of type 2 DM, CAD s/p CABG, and HTN presented with complaint of right toe pain. Endocrinology was consulted for management of uncontrolled type 2 DM with hyperglycemia, A1c 10.9%.     Patient seen and examined at bedside. He reports improved pain in right lower extremity and states he has poor appetite. As per nurse at bedside he has been eating food brought by family from [address]. FS overall well controlled with pre-lunch BG above goal >200. He had unsuccessful angioplasty yesterday. Vascular surgery considering bypass vs BKA.    MEDICATIONS  (STANDING):  bisacodyl 5 milliGRAM(s) Oral at bedtime  ceFAZolin   IVPB 1000 milliGRAM(s) IV Intermittent every 8 hours  heparin  Infusion.  Unit(s)/Hr (11 mL/Hr) IV Continuous <Continuous>  influenza   Vaccine 0.5 milliLiter(s) IntraMuscular once  insulin glargine Injectable (LANTUS) 11 Unit(s) SubCutaneous at bedtime  insulin lispro (HumaLOG) corrective regimen sliding scale   SubCutaneous at bedtime  insulin lispro (HumaLOG) corrective regimen sliding scale   SubCutaneous three times a day before meals  insulin lispro Injectable (HumaLOG) 4 Unit(s) SubCutaneous three times a day before meals  metoprolol tartrate 12.5 milliGRAM(s) Oral two times a day  senna 2 Tablet(s) Oral at bedtime  simvastatin 40 milliGRAM(s) Oral at bedtime  tamsulosin 0.4 milliGRAM(s) Oral at bedtime    MEDICATIONS  (PRN):  acetaminophen   Tablet .. 650 milliGRAM(s) Oral every 6 hours PRN Mild Pain (1 - 3)  oxycodone    5 mG/acetaminophen 325 mG 1 Tablet(s) Oral every 6 hours PRN Moderate Pain (4 - 6)  oxycodone    5 mG/acetaminophen 325 mG 2 Tablet(s) Oral every 6 hours PRN Severe Pain (7 - 10)      REVIEW OF SYSTEMS:  CONSTITUTIONAL: No fever, weight loss, or fatigue  EYES: No eye pain, visual disturbances, or discharge  ENMT:  No difficulty hearing, tinnitus, vertigo; No sinus or throat pain  NECK: No pain or stiffness  RESPIRATORY: No cough, wheezing, chills or hemoptysis; No shortness of breath  CARDIOVASCULAR: No chest pain, palpitations, dizziness, or leg swelling  GASTROINTESTINAL: No abdominal or epigastric pain. No nausea, vomiting, or hematemesis; No diarrhea or constipation. No melena or hematochezia.  GENITOURINARY: No dysuria, frequency, hematuria, or incontinence  NEUROLOGICAL: No headaches, memory loss, loss of strength, numbness, or tremors  SKIN: right second toe wound with pain and darkening of the skin, No itching, burning, or rashes  LYMPH NODES: No enlarged glands  ENDOCRINE: No heat or cold intolerance; No hair loss  MUSCULOSKELETAL: No joint pain or swelling; No muscle, back, or extremity pain  PSYCHIATRIC: No depression, anxiety, mood swings, or difficulty sleeping  HEME/LYMPH: No easy bruising, or bleeding gums  ALLERGY AND IMMUNOLOGIC: No hives or eczema      PHYSICAL EXAM:    VITAL SIGNS  T(C): 36.3 (11-08-19 @ 07:42), Max: 36.7 (11-07-19 @ 17:24)  HR: 56 (11-08-19 @ 07:42) (56 - 67)  BP: 107/52 (11-08-19 @ 07:42) (107/52 - 156/55)  RR: 17 (11-08-19 @ 07:42) (14 - 20)  SpO2: 99% (11-08-19 @ 07:42) (95% - 99%)    General: Alert and oriented. No acute distress.   Eye: Extraocular movements are intact.    HENT:  Normocephalic.    Respiratory: Respirations are non-labored, Symmetrical chest wall expansion.    Gastrointestinal:  Non-distended.    Neurologic:  Alert and oriented X 3, No focal defects, Cranial Nerves II-XII are grossly intact.    Psychiatric:  Cooperative, Appropriate mood & affect.  SKIN: right second toe wound with skin discoloration, no discharge. No rashes or lesions      LABS:                        10.3   7.82  )-----------( 252      ( 08 Nov 2019 11:13 )             30.6     11-08    139  |  106  |  12  ----------------------------<  117<H>  3.7   |  28  |  0.56    Ca    8.0<L>      08 Nov 2019 05:09  Phos  2.6     11-08  Mg     2.1     11-08    TPro  6.3  /  Alb  2.5<L>  /  TBili  0.4  /  DBili  x   /  AST  19  /  ALT  37  /  AlkPhos  110  11-07    PT/INR - ( 07 Nov 2019 06:40 )   PT: 12.7 sec;   INR: 1.14 ratio         PTT - ( 08 Nov 2019 11:13 )  PTT:140.5 sec    CAPILLARY BLOOD GLUCOSE      POCT Blood Glucose.: 229 mg/dL (08 Nov 2019 11:47)  POCT Blood Glucose.: 123 mg/dL (08 Nov 2019 08:09)  POCT Blood Glucose.: 169 mg/dL (07 Nov 2019 21:14)  POCT Blood Glucose.: 178 mg/dL (07 Nov 2019 16:57)

## 2019-11-08 NOTE — PROGRESS NOTE ADULT - PROBLEM SELECTOR PLAN 1
- p/w right 2nd Toe and Cellulitis with erythema, swelling, distally purple  - warm to touch with some tenderness and cool at the tip   - No Leukocytosis   - Afebrile   - c/w cefazolin  - LESLIE reviewed- 0.71 on the right foot, moderate   - podiatry consult reviewed- no intervention  - ID consulted reviewed-  -plan for vein mapping to assess for options for bypass vs BKA  -f/u vascular

## 2019-11-08 NOTE — PROGRESS NOTE ADULT - SUBJECTIVE AND OBJECTIVE BOX
PGY 1 Note discussed with supervising resident and primary attending    Patient is a 79y old  Male who presents with a chief complaint of 2nd right toe pain (08 Nov 2019 00:11)    INTERVAL HPI/OVERNIGHT EVENTS: Patient seen and examined at the bedside. Patient had angiogram done yesterday. As per vascular, angioplasty was unsuccessful. Heparin drip started overnight. Had gross hematuria overnight. Plavix was stopped. Rizzo placed in OR was continued. Hematuria resolving this morning. Will remove rizzo and monitor. Plan for vein mapping today to assess options for possible bypass vs BKA. Patient complains of pain in right foot. Denies any other concerns or complaints at this time.     MEDICATIONS  (STANDING):  bisacodyl 5 milliGRAM(s) Oral at bedtime  ceFAZolin   IVPB 1000 milliGRAM(s) IV Intermittent every 8 hours  heparin  Infusion.  Unit(s)/Hr (11 mL/Hr) IV Continuous <Continuous>  influenza   Vaccine 0.5 milliLiter(s) IntraMuscular once  insulin glargine Injectable (LANTUS) 11 Unit(s) SubCutaneous at bedtime  insulin lispro (HumaLOG) corrective regimen sliding scale   SubCutaneous at bedtime  insulin lispro (HumaLOG) corrective regimen sliding scale   SubCutaneous three times a day before meals  insulin lispro Injectable (HumaLOG) 4 Unit(s) SubCutaneous three times a day before meals  metoprolol tartrate 12.5 milliGRAM(s) Oral two times a day  senna 2 Tablet(s) Oral at bedtime  simvastatin 40 milliGRAM(s) Oral at bedtime  tamsulosin 0.4 milliGRAM(s) Oral at bedtime    MEDICATIONS  (PRN):  acetaminophen   Tablet .. 650 milliGRAM(s) Oral every 6 hours PRN Mild Pain (1 - 3)  oxycodone    5 mG/acetaminophen 325 mG 1 Tablet(s) Oral every 6 hours PRN Moderate Pain (4 - 6)  oxycodone    5 mG/acetaminophen 325 mG 2 Tablet(s) Oral every 6 hours PRN Severe Pain (7 - 10)      __________________________________________________  REVIEW OF SYSTEMS:  CONSTITUTIONAL: No fever   EYES: no visual disturbances  NECK: No pain  RESPIRATORY: No cough; No shortness of breath  CARDIOVASCULAR: No chest pain  GASTROINTESTINAL: No pain. No nausea or vomiting   NEUROLOGICAL: No headache   MUSCULOSKELETAL: pain in right foot  GENITOURINARY: no dysuria  PSYCHIATRY: no depression, no anxiety  ALL OTHER  ROS negative        Vital Signs Last 24 Hrs  T(C): 36.3 (08 Nov 2019 07:42), Max: 36.7 (07 Nov 2019 17:24)  T(F): 97.3 (08 Nov 2019 07:42), Max: 98.1 (07 Nov 2019 17:24)  HR: 56 (08 Nov 2019 07:42) (56 - 67)  BP: 107/52 (08 Nov 2019 07:42) (107/52 - 156/55)  BP(mean): 87 (07 Nov 2019 17:54) (73 - 87)  RR: 17 (08 Nov 2019 07:42) (14 - 20)  SpO2: 99% (08 Nov 2019 07:42) (95% - 99%)    ________________________________________________  PHYSICAL EXAM:  GENERAL: Patient seen resting in bed and in no apparent distress  HEENT: Normocephalic; conjunctivae and sclerae clear   NECK: supple  CHEST/LUNG: Clear to auscultation bilaterally with good air entry   HEART: S1 S2, regular; no murmurs  ABDOMEN: Soft, Nontender, Nondistended; Bowel sounds present  EXTREMITIES: no edema; no calf tenderness; discoloration of right second toe; pulses were palpable bilaterally on lower extremities  SKIN: warm and dry  NERVOUS SYSTEM: Awake and alert; Oriented to place, person and time     _________________________________________________  LABS:                        10.7   7.62  )-----------( 268      ( 08 Nov 2019 05:09 )             30.8     11-08    139  |  106  |  12  ----------------------------<  117<H>  3.7   |  28  |  0.56    Ca    8.0<L>      08 Nov 2019 05:09  Phos  2.6     11-08  Mg     2.1     11-08    TPro  6.3  /  Alb  2.5<L>  /  TBili  0.4  /  DBili  x   /  AST  19  /  ALT  37  /  AlkPhos  110  11-07    PT/INR - ( 07 Nov 2019 06:40 )   PT: 12.7 sec;   INR: 1.14 ratio         PTT - ( 08 Nov 2019 05:09 )  PTT:83.9 sec    CAPILLARY BLOOD GLUCOSE      POCT Blood Glucose.: 123 mg/dL (08 Nov 2019 08:09)  POCT Blood Glucose.: 169 mg/dL (07 Nov 2019 21:14)  POCT Blood Glucose.: 178 mg/dL (07 Nov 2019 16:57)    RADIOLOGY & ADDITIONAL TESTS:    Imaging Personally Reviewed:  YES    No new imaging     Consultant(s) Notes Reviewed:   YES     Care Discussed with Consultants :     Plan of care was discussed with patient and /or primary care giver; all questions and concerns were addressed and care was aligned with patient's wishes.

## 2019-11-08 NOTE — PROGRESS NOTE ADULT - ASSESSMENT
79 year old [place] speaking male from home lives alone ambulates with cane at baseline with PMH of DM, HTN and PSH open heart surgery presents with toe pain. Reports R 2nd toe pain and wound since tripping and falling on it at [employer] on october 29th while wearing slippers.

## 2019-11-08 NOTE — PROGRESS NOTE ADULT - ASSESSMENT
79y.o. Male with PAD s/p angiogram, unsuccessful angioplasty    -wound care per protocol  -Hep drip  -Palm catheter  -Medical optimization for further surgical intervention, bypass vs BKA. Will need to discuss next surgical options with patient  -d/w attending

## 2019-11-08 NOTE — PROGRESS NOTE ADULT - ATTENDING COMMENTS
Seen ex'ed w Turkish speaking PA  R foot/toes pain back to baseline  R groin ok  Palp fem/pop pulses.  R 2nd toe/dist foot w resid isch changes. Dry    A/P:   PAD/R toe post traumatic pregang  Angio yest: Ext tibial dz.  Unable to cross AT lesion.  Limb threatened    Rec:   Pain control  Lidoderm patch to foot.  Will consider additional vasc options vs amp.  May need TMA/BKA  DW'ed pt

## 2019-11-08 NOTE — PROGRESS NOTE ADULT - PROBLEM SELECTOR PLAN 4
- continue Lisinopril  for now with parameters    - Monitor BP closely and adjust medications if clinically indicated.  - DASH diet.

## 2019-11-08 NOTE — PROGRESS NOTE ADULT - SUBJECTIVE AND OBJECTIVE BOX
INTERVAL HPI/OVERNIGHT EVENTS:    No acute events overnight.   Pt resting comfortably. No acute complaints.   s/p angio yesterday, pt denies pain    MEDICATIONS  (STANDING):  bisacodyl 5 milliGRAM(s) Oral at bedtime  ceFAZolin   IVPB 1000 milliGRAM(s) IV Intermittent every 8 hours  heparin  Infusion.  Unit(s)/Hr (11 mL/Hr) IV Continuous <Continuous>  influenza   Vaccine 0.5 milliLiter(s) IntraMuscular once  insulin glargine Injectable (LANTUS) 11 Unit(s) SubCutaneous at bedtime  insulin lispro (HumaLOG) corrective regimen sliding scale   SubCutaneous at bedtime  insulin lispro (HumaLOG) corrective regimen sliding scale   SubCutaneous three times a day before meals  insulin lispro Injectable (HumaLOG) 4 Unit(s) SubCutaneous three times a day before meals  metoprolol tartrate 12.5 milliGRAM(s) Oral two times a day  senna 2 Tablet(s) Oral at bedtime  simvastatin 40 milliGRAM(s) Oral at bedtime  tamsulosin 0.4 milliGRAM(s) Oral at bedtime    MEDICATIONS  (PRN):  acetaminophen   Tablet .. 650 milliGRAM(s) Oral every 6 hours PRN Mild Pain (1 - 3)  oxycodone    5 mG/acetaminophen 325 mG 1 Tablet(s) Oral every 6 hours PRN Moderate Pain (4 - 6)  oxycodone    5 mG/acetaminophen 325 mG 2 Tablet(s) Oral every 6 hours PRN Severe Pain (7 - 10)      Vital Signs Last 24 Hrs  T(C): 36.3 (08 Nov 2019 07:42), Max: 36.7 (07 Nov 2019 17:24)  T(F): 97.3 (08 Nov 2019 07:42), Max: 98.1 (07 Nov 2019 17:24)  HR: 56 (08 Nov 2019 07:42) (56 - 67)  BP: 107/52 (08 Nov 2019 07:42) (107/52 - 156/55)  BP(mean): 87 (07 Nov 2019 17:54) (73 - 87)  RR: 17 (08 Nov 2019 07:42) (14 - 20)  SpO2: 99% (08 Nov 2019 07:42) (95% - 99%)      PHYSICAL EXAM  General: Alert and oriented, not in acute distress  Resp: Breathing unlabored  Abdomen: Soft, nondistended, nontender  : No rizzo catheter, no dysuria or hematuria  Extremities: R 2nd toe with hematoma, wound unchanged without drainage, unpalpable DP, slightly cold to touch, s/m still intact; RLE compartments soft, no ecchymoses or hematoma    I&O's Detail    07 Nov 2019 07:01  -  08 Nov 2019 07:00  --------------------------------------------------------  IN:    Lactated Ringers IV Bolus: 1000 mL  Total IN: 1000 mL    OUT:    Indwelling Catheter - Urethral: 1440 mL  Total OUT: 1440 mL    Total NET: -440 mL      08 Nov 2019 07:01  -  08 Nov 2019 13:21  --------------------------------------------------------  IN:  Total IN: 0 mL    OUT:    Indwelling Catheter - Urethral: 200 mL  Total OUT: 200 mL    Total NET: -200 mL          LABS:                        10.3   7.82  )-----------( 252      ( 08 Nov 2019 11:13 )             30.6             11-08    139  |  106  |  12  ----------------------------<  117<H>  3.7   |  28  |  0.56    Ca    8.0<L>      08 Nov 2019 05:09  Phos  2.6     11-08  Mg     2.1     11-08    TPro  6.3  /  Alb  2.5<L>  /  TBili  0.4  /  DBili  x   /  AST  19  /  ALT  37  /  AlkPhos  110  11-07

## 2019-11-09 LAB
ANION GAP SERPL CALC-SCNC: 5 MMOL/L — SIGNIFICANT CHANGE UP (ref 5–17)
APTT BLD: 94.6 SEC — HIGH (ref 27.5–36.3)
BUN SERPL-MCNC: 8 MG/DL — SIGNIFICANT CHANGE UP (ref 7–18)
CALCIUM SERPL-MCNC: 7.9 MG/DL — LOW (ref 8.4–10.5)
CHLORIDE SERPL-SCNC: 109 MMOL/L — HIGH (ref 96–108)
CO2 SERPL-SCNC: 27 MMOL/L — SIGNIFICANT CHANGE UP (ref 22–31)
CREAT SERPL-MCNC: 0.54 MG/DL — SIGNIFICANT CHANGE UP (ref 0.5–1.3)
CULTURE RESULTS: SIGNIFICANT CHANGE UP
CULTURE RESULTS: SIGNIFICANT CHANGE UP
GLUCOSE BLDC GLUCOMTR-MCNC: 104 MG/DL — HIGH (ref 70–99)
GLUCOSE BLDC GLUCOMTR-MCNC: 142 MG/DL — HIGH (ref 70–99)
GLUCOSE BLDC GLUCOMTR-MCNC: 170 MG/DL — HIGH (ref 70–99)
GLUCOSE BLDC GLUCOMTR-MCNC: 172 MG/DL — HIGH (ref 70–99)
GLUCOSE SERPL-MCNC: 111 MG/DL — HIGH (ref 70–99)
HCT VFR BLD CALC: 31.8 % — LOW (ref 39–50)
HGB BLD-MCNC: 10.8 G/DL — LOW (ref 13–17)
MCHC RBC-ENTMCNC: 32.6 PG — SIGNIFICANT CHANGE UP (ref 27–34)
MCHC RBC-ENTMCNC: 34 GM/DL — SIGNIFICANT CHANGE UP (ref 32–36)
MCV RBC AUTO: 96.1 FL — SIGNIFICANT CHANGE UP (ref 80–100)
NRBC # BLD: 0 /100 WBCS — SIGNIFICANT CHANGE UP (ref 0–0)
PLATELET # BLD AUTO: 302 K/UL — SIGNIFICANT CHANGE UP (ref 150–400)
POTASSIUM SERPL-MCNC: 3.6 MMOL/L — SIGNIFICANT CHANGE UP (ref 3.5–5.3)
POTASSIUM SERPL-SCNC: 3.6 MMOL/L — SIGNIFICANT CHANGE UP (ref 3.5–5.3)
RBC # BLD: 3.31 M/UL — LOW (ref 4.2–5.8)
RBC # FLD: 11.8 % — SIGNIFICANT CHANGE UP (ref 10.3–14.5)
SODIUM SERPL-SCNC: 141 MMOL/L — SIGNIFICANT CHANGE UP (ref 135–145)
SPECIMEN SOURCE: SIGNIFICANT CHANGE UP
SPECIMEN SOURCE: SIGNIFICANT CHANGE UP
WBC # BLD: 7.88 K/UL — SIGNIFICANT CHANGE UP (ref 3.8–10.5)
WBC # FLD AUTO: 7.88 K/UL — SIGNIFICANT CHANGE UP (ref 3.8–10.5)

## 2019-11-09 PROCEDURE — 99231 SBSQ HOSP IP/OBS SF/LOW 25: CPT

## 2019-11-09 PROCEDURE — 99232 SBSQ HOSP IP/OBS MODERATE 35: CPT

## 2019-11-09 RX ORDER — ACETAMINOPHEN 500 MG
1000 TABLET ORAL ONCE
Refills: 0 | Status: COMPLETED | OUTPATIENT
Start: 2019-11-10 | End: 2019-11-10

## 2019-11-09 RX ORDER — OXYCODONE HYDROCHLORIDE 5 MG/1
10 TABLET ORAL EVERY 6 HOURS
Refills: 0 | Status: DISCONTINUED | OUTPATIENT
Start: 2019-11-09 | End: 2019-11-14

## 2019-11-09 RX ORDER — ACETAMINOPHEN 500 MG
1000 TABLET ORAL ONCE
Refills: 0 | Status: COMPLETED | OUTPATIENT
Start: 2019-11-09 | End: 2019-11-09

## 2019-11-09 RX ORDER — GABAPENTIN 400 MG/1
300 CAPSULE ORAL AT BEDTIME
Refills: 0 | Status: DISCONTINUED | OUTPATIENT
Start: 2019-11-09 | End: 2019-11-10

## 2019-11-09 RX ADMIN — SIMVASTATIN 40 MILLIGRAM(S): 20 TABLET, FILM COATED ORAL at 21:43

## 2019-11-09 RX ADMIN — TAMSULOSIN HYDROCHLORIDE 0.4 MILLIGRAM(S): 0.4 CAPSULE ORAL at 21:44

## 2019-11-09 RX ADMIN — LIDOCAINE 1 PATCH: 4 CREAM TOPICAL at 10:26

## 2019-11-09 RX ADMIN — Medication 100 MILLIGRAM(S): at 05:39

## 2019-11-09 RX ADMIN — SENNA PLUS 2 TABLET(S): 8.6 TABLET ORAL at 21:43

## 2019-11-09 RX ADMIN — Medication 1000 MILLIGRAM(S): at 11:45

## 2019-11-09 RX ADMIN — LIDOCAINE 1 PATCH: 4 CREAM TOPICAL at 11:41

## 2019-11-09 RX ADMIN — LIDOCAINE 1 PATCH: 4 CREAM TOPICAL at 23:13

## 2019-11-09 RX ADMIN — Medication 4 UNIT(S): at 08:47

## 2019-11-09 RX ADMIN — GABAPENTIN 300 MILLIGRAM(S): 400 CAPSULE ORAL at 21:43

## 2019-11-09 RX ADMIN — OXYCODONE AND ACETAMINOPHEN 1 TABLET(S): 5; 325 TABLET ORAL at 09:19

## 2019-11-09 RX ADMIN — Medication 1000 MILLIGRAM(S): at 20:07

## 2019-11-09 RX ADMIN — OXYCODONE AND ACETAMINOPHEN 2 TABLET(S): 5; 325 TABLET ORAL at 06:30

## 2019-11-09 RX ADMIN — OXYCODONE HYDROCHLORIDE 10 MILLIGRAM(S): 5 TABLET ORAL at 18:54

## 2019-11-09 RX ADMIN — Medication 400 MILLIGRAM(S): at 19:26

## 2019-11-09 RX ADMIN — Medication 400 MILLIGRAM(S): at 11:39

## 2019-11-09 RX ADMIN — Medication 5 MILLIGRAM(S): at 21:43

## 2019-11-09 RX ADMIN — Medication 100 MILLIGRAM(S): at 14:24

## 2019-11-09 RX ADMIN — LIDOCAINE 1 PATCH: 4 CREAM TOPICAL at 08:28

## 2019-11-09 RX ADMIN — OXYCODONE AND ACETAMINOPHEN 1 TABLET(S): 5; 325 TABLET ORAL at 08:49

## 2019-11-09 RX ADMIN — Medication 1: at 21:44

## 2019-11-09 RX ADMIN — Medication 100 MILLIGRAM(S): at 21:44

## 2019-11-09 RX ADMIN — OXYCODONE AND ACETAMINOPHEN 2 TABLET(S): 5; 325 TABLET ORAL at 05:38

## 2019-11-09 RX ADMIN — OXYCODONE AND ACETAMINOPHEN 2 TABLET(S): 5; 325 TABLET ORAL at 14:53

## 2019-11-09 RX ADMIN — LIDOCAINE 1 PATCH: 4 CREAM TOPICAL at 19:41

## 2019-11-09 RX ADMIN — Medication 12.5 MILLIGRAM(S): at 17:32

## 2019-11-09 RX ADMIN — OXYCODONE AND ACETAMINOPHEN 2 TABLET(S): 5; 325 TABLET ORAL at 14:23

## 2019-11-09 RX ADMIN — Medication 2: at 17:31

## 2019-11-09 RX ADMIN — OXYCODONE HYDROCHLORIDE 10 MILLIGRAM(S): 5 TABLET ORAL at 18:24

## 2019-11-09 RX ADMIN — INSULIN GLARGINE 11 UNIT(S): 100 INJECTION, SOLUTION SUBCUTANEOUS at 21:38

## 2019-11-09 RX ADMIN — HEPARIN SODIUM 900 UNIT(S)/HR: 5000 INJECTION INTRAVENOUS; SUBCUTANEOUS at 01:08

## 2019-11-09 RX ADMIN — Medication 4 UNIT(S): at 17:31

## 2019-11-09 NOTE — PROGRESS NOTE ADULT - ASSESSMENT
Cellulitis of right 2nd toe and foot and leg  Mild skin necrosis on plantar aspect of 2nd toe    Plan  - Continue Kefzol 1 gm iv q8hrs (day #5)  - Awaiting for right lower extremity by pass vs BKA

## 2019-11-09 NOTE — CONSULT NOTE ADULT - PROBLEM SELECTOR RECOMMENDATION 9
- pain of 2nd and 3rd digits of right foot  - gabapentin 300mg po q hs, will titrate upwards  - oxycodone 10mg po q 6 hours prn  - acetaminophen 1 gram for 3 doses  - stool softeners  - lidocaine patch   - OR tues  bypass vs. bka

## 2019-11-09 NOTE — PROGRESS NOTE ADULT - PROBLEM SELECTOR PLAN 1
- p/w right 2nd Toe and Cellulitis with erythema, swelling, distally purple  - No Leukocytosis   - Afebrile   - c/w cefazolin as per ID  - LESLIE reviewed- 0.71 on the right foot, moderate   -s/p unsuccessful angioplasty  -plan for OR on tuesday for possible bypass vs BKA vs. TMA  -f/u vascular  -heparin drip over weekend - p/w right 2nd Toe and Cellulitis with erythema, swelling, distally purple  - No Leukocytosis   - Afebrile   - c/w cefazolin as per ID  - LESLIE reviewed- 0.71 on the right foot, moderate   -s/p unsuccessful angioplasty  -plan for OR on tuesday for possible bypass vs BKA vs. TMA  -f/u vascular  -heparin drip over weekend  -f/u pain management [place]

## 2019-11-09 NOTE — CONSULT NOTE ADULT - SUBJECTIVE AND OBJECTIVE BOX
HPI:  79 year old Welsh speaking male from home lives alone ambulates with cane at baseline with PMH of DM, HTN and PSH open heart surgery presents with toe pain. Reports R 2nd toe pain and wound since tripping and falling on it at [employer] on october 29th while wearing slippers . Saw PMD Dr. David Domínguez who prescribed gabapentin and oxycodone, noticed a cut on a toe, applied tar ointment and controlled pain with medications. pt states no imaging at that time. pt states 2/2 the pain has been having difficulty getting around and has not been eating, and feels that he needs to stay in the hospital pt reported excruciating pain with increased numbness and black discoloration on toe with severe pain intermittent cold sensation of feet.  No h/o foot wounds, no prior vascular testing, not on AC. pt denies fever or chills, CP or SOB. nausea , vomiting , diarrhea , exposure to dirty water or mosquitoes bites.     Pt is FULL CODE. (04 Nov 2019 08:38)          PAIN SCORE:         SCALE USED: (1-10 VNRS)      PAST MEDICAL & SURGICAL HISTORY:  Diabetes  History of open heart surgery      FAMILY HISTORY:      SOCIAL HISTORY:  [ ] Denies Smoking, Alcohol, or Drug Use    Allergies    No Known Allergies    Intolerances        PAIN MEDICATIONS:  gabapentin 300 milliGRAM(s) Oral at bedtime  oxyCODONE    IR 10 milliGRAM(s) Oral every 6 hours PRN    Heme:  heparin  Infusion.  Unit(s)/Hr IV Continuous <Continuous>    Antibiotics:  ceFAZolin   IVPB 1000 milliGRAM(s) IV Intermittent every 8 hours    Cardiovascular:  metoprolol tartrate 12.5 milliGRAM(s) Oral two times a day  tamsulosin 0.4 milliGRAM(s) Oral at bedtime    GI:  bisacodyl 5 milliGRAM(s) Oral at bedtime  senna 2 Tablet(s) Oral at bedtime    Endocrine:  insulin glargine Injectable (LANTUS) 11 Unit(s) SubCutaneous at bedtime  insulin lispro (HumaLOG) corrective regimen sliding scale   SubCutaneous at bedtime  insulin lispro (HumaLOG) corrective regimen sliding scale   SubCutaneous three times a day before meals  insulin lispro Injectable (HumaLOG) 4 Unit(s) SubCutaneous three times a day before meals  simvastatin 40 milliGRAM(s) Oral at bedtime    All Other Medications:  influenza   Vaccine 0.5 milliLiter(s) IntraMuscular once  lidocaine   Patch 1 Patch Transdermal daily        Vital Signs Last 24 Hrs  T(C): 36.5 (09 Nov 2019 15:46), Max: 36.7 (09 Nov 2019 00:28)  T(F): 97.7 (09 Nov 2019 15:46), Max: 98.1 (09 Nov 2019 00:28)  HR: 60 (09 Nov 2019 15:46) (59 - 62)  BP: 108/44 (09 Nov 2019 15:46) (108/44 - 111/45)  BP(mean): --  RR: 18 (09 Nov 2019 15:46) (16 - 18)  SpO2: 95% (09 Nov 2019 15:46) (95% - 98%)    LABS:                          10.8   7.88  )-----------( 302      ( 09 Nov 2019 05:49 )             31.8     11-09    141  |  109<H>  |  8   ----------------------------<  111<H>  3.6   |  27  |  0.54    Ca    7.9<L>      09 Nov 2019 05:49  Phos  2.6     11-08  Mg     2.1     11-08      PTT - ( 09 Nov 2019 00:30 )  PTT:94.6 sec        REVIEW OF SYSTEMS:  CONSTITUTIONAL: No fever, weight loss, or fatigue  NEUROLOGICAL: No headaches, memory loss, loss of strength, numbness, tremors, dizziness or blurred vision  RESPIRATORY: No cough, wheezing, chills or hemoptysis; No shortness of breath  CARDIOVASCULAR: No chest pain, palpitations, dizziness, or leg swelling  GASTROINTESTINAL: No abdominal or epigastric pain. No nausea, vomiting, or hematemesis; No diarrhea or constipation. No melena or hematochezia.  GENITOURINARY: No dysuria, frequency, hematuria, retention or incontinence  MUSCULOSKELETAL: No joint pain or swelling; No muscle, back, or extremity pain, no upper or lower motor strength weakness, no saddle anesthesia, bowel/bladder incontinence, no falls  SKIN: No itching, burning, rashes, or lesions   PSYCHIATRIC: No depression, anxiety, mood swings, or difficulty sleeping    PHYSICAL EXAM:  GENERAL: NAD, well-groomed, well-developed, no signs of toxicity  NERVOUS SYSTEM:  Alert & Oriented X3, Good concentration  HEAD:  Atraumatic, Normocephalic, symmetrical  CHEST/LUNG: Clear to auscultation bilaterally; No rales, rhonchi, wheezing, or rubs  HEART: Regular rate and rhythm; No murmurs, rubs, or gallops  ABDOMEN: Soft, Nontender, Nondistended; Bowel sounds present  EXTREMITIES:  2+ Peripheral Pulses, No clubbing, cyanosis, or edema  MUSCULOSKELETAL: Motor Strength 5/5 B/L upper and lower extremities; moves all extremities equally against gravity; ROM intact; + decreased rom  SKIN: No rashes or lesions    RADIOLOGY:    Drug Screen:          ORT Score -   [ ]  TRYEL  Reviewed and Copied to Chart HPI:  79 year old Maori speaking male from home lives alone ambulates with cane at baseline with PMH of DM, HTN and PSH open heart surgery presents with toe pain. Reports R 2nd toe pain and wound since tripping and falling on it at [employer] on october 29th while wearing slippers . Saw PMD Dr. David Domínguez who prescribed gabapentin and oxycodone, noticed a cut on a toe, applied tar ointment and controlled pain with medications. pt states no imaging at that time. pt states 2/2 the pain has been having difficulty getting around and has not been eating, and feels that he needs to stay in the hospital pt reported excruciating pain with increased numbness and black discoloration on toe with severe pain intermittent cold sensation of feet.  No h/o foot wounds, no prior vascular testing, not on AC. pt denies fever or chills, CP or SOB. nausea , vomiting , diarrhea , exposure to dirty water or mosquitoes bites.     79 year old male complaining of right 2nd toe pain.  + severe pain.  Pain worsens with movement.  No nausea or vomiting  NO chest pain or sob.  +Pt with cellulitis of right foot 1st and 2nd toe.  + ischemia to 2nd.  Pt s/p unsuccessful angioplasty  Pt to go to OR Tuesday bypass vs bka.  services used - [id number] ([place])    Pt is FULL CODE. (04 Nov 2019 08:38)          PAIN SCORE:      6/10   SCALE USED: (1-10 VNRS)      PAST MEDICAL & SURGICAL HISTORY:  Diabetes  History of open heart surgery      FAMILY HISTORY:      SOCIAL HISTORY:  [x ] Denies Smoking, Alcohol, or Drug Use    Allergies    No Known Allergies    Intolerances        PAIN MEDICATIONS:  gabapentin 300 milliGRAM(s) Oral at bedtime  oxyCODONE    IR 10 milliGRAM(s) Oral every 6 hours PRN    Heme:  heparin  Infusion.  Unit(s)/Hr IV Continuous <Continuous>    Antibiotics:  ceFAZolin   IVPB 1000 milliGRAM(s) IV Intermittent every 8 hours    Cardiovascular:  metoprolol tartrate 12.5 milliGRAM(s) Oral two times a day  tamsulosin 0.4 milliGRAM(s) Oral at bedtime    GI:  bisacodyl 5 milliGRAM(s) Oral at bedtime  senna 2 Tablet(s) Oral at bedtime    Endocrine:  insulin glargine Injectable (LANTUS) 11 Unit(s) SubCutaneous at bedtime  insulin lispro (HumaLOG) corrective regimen sliding scale   SubCutaneous at bedtime  insulin lispro (HumaLOG) corrective regimen sliding scale   SubCutaneous three times a day before meals  insulin lispro Injectable (HumaLOG) 4 Unit(s) SubCutaneous three times a day before meals  simvastatin 40 milliGRAM(s) Oral at bedtime    All Other Medications:  influenza   Vaccine 0.5 milliLiter(s) IntraMuscular once  lidocaine   Patch 1 Patch Transdermal daily        Vital Signs Last 24 Hrs  T(C): 36.5 (09 Nov 2019 15:46), Max: 36.7 (09 Nov 2019 00:28)  T(F): 97.7 (09 Nov 2019 15:46), Max: 98.1 (09 Nov 2019 00:28)  HR: 60 (09 Nov 2019 15:46) (59 - 62)  BP: 108/44 (09 Nov 2019 15:46) (108/44 - 111/45)  BP(mean): --  RR: 18 (09 Nov 2019 15:46) (16 - 18)  SpO2: 95% (09 Nov 2019 15:46) (95% - 98%)    LABS:                          10.8   7.88  )-----------( 302      ( 09 Nov 2019 05:49 )             31.8     11-09    141  |  109<H>  |  8   ----------------------------<  111<H>  3.6   |  27  |  0.54    Ca    7.9<L>      09 Nov 2019 05:49  Phos  2.6     11-08  Mg     2.1     11-08      PTT - ( 09 Nov 2019 00:30 )  PTT:94.6 sec        REVIEW OF SYSTEMS:  CONSTITUTIONAL: No fever, weight loss, or fatigue  NEUROLOGICAL: No headaches, memory loss, loss of strength, numbness, tremors, dizziness or blurred vision  RESPIRATORY: No cough, wheezing, chills or hemoptysis; No shortness of breath  CARDIOVASCULAR: No chest pain, palpitations, dizziness, or leg swelling  GASTROINTESTINAL: No abdominal or epigastric pain. No nausea, vomiting, or hematemesis; No diarrhea or constipation. No melena or hematochezia.  GENITOURINARY: No dysuria, frequency, hematuria, retention or incontinence  MUSCULOSKELETAL: No joint pain or swelling; No muscle, back, or extremity pain, no upper or lower motor strength weakness, no saddle anesthesia, bowel/bladder incontinence, no falls  SKIN: + right foot - 2nd toe and 3rd toe - + discoloration, right foot warm      PHYSICAL EXAM:  GENERAL: NAD, nad  NERVOUS SYSTEM:  Alert & Oriented X3, Good concentration  HEAD:  Atraumatic, Normocephalic, symmetrical  CHEST/LUNG: Clear to auscultation bilaterally; No rales, rhonchi, wheezing, or rubs  HEART: Regular rate and rhythm; No murmurs, rubs, or gallops  ABDOMEN: Soft, Nontender, Nondistended; Bowel sounds present  EXTREMITIES:  + right foot - 2nd and 3rd toe discoloration, cool, Right foot - warm   MUSCULOSKELETAL: Motor Strength 5/5 B/L upper and lower extremities; moves all extremities equally against gravity; ROM intact; + decreased rom    RADIOLOGY:  < from: Xray Toes, Right Foot (11.04.19 @ 06:04) >  EXAM:  TOE(S) RIGHT FOOT                            PROCEDURE DATE:  11/04/2019          INTERPRETATION:  Right toes x-rays    Indication: Pain.    3 views of the right toes are acquired without a previous examination for   comparison.    Impression: No evidence for an acute fracture or dislocation.    The joint spaces are preserved.    The osseous mineralization is within normal limits.     < end of copied text >      Drug Screen:          ORT Score -   [ ]  [place]  Reviewed and Copied to Chart

## 2019-11-09 NOTE — PROGRESS NOTE ADULT - SUBJECTIVE AND OBJECTIVE BOX
Vascular Surgery    Subjective:  Pt resting comfortably. No acute complaints  Tolerating pain with meds.  Tolerating diet  Voiding well without hematuria after rizzo removal  On hep drip.     T(C): 36.6 (11-09-19 @ 07:48), Max: 36.8 (11-08-19 @ 16:18)  HR: 62 (11-09-19 @ 07:48) (59 - 67)  BP: 111/45 (11-09-19 @ 07:48) (108/48 - 131/52)  RR: 18 (11-09-19 @ 07:48) (16 - 18)  SpO2: 97% (11-09-19 @ 07:48) (97% - 98%)    Physical:  Gen: A&O x3. NAD  RLE: Warm, sensation intact. Stable 3rd toe ischemia. Palpable popliteal pulse.    79y.o. Male s/p angiogram, unsuccessful angioplasty POD#2    -Pain control prn  -Hep drip  -RLE vein mapping  -Possible bypass vs BKA  -Medical optimization

## 2019-11-09 NOTE — PROGRESS NOTE ADULT - PROBLEM SELECTOR PLAN 3
-vascular consult  -unsuccessful angioplasty   -LESLIE reviewed- 0.71 on the right foot, moderate   -plan for OR on tuesday for possible bypass vs BKA vs. TMA

## 2019-11-09 NOTE — PROGRESS NOTE ADULT - ATTENDING COMMENTS
Patient was examined and discussed with Dr. Carrillo.    He still c/o pain, which is relieved by analgesics.     Vital Signs Last 24 Hrs  T(C): 36.6 (09 Nov 2019 07:48), Max: 36.8 (08 Nov 2019 16:18)  T(F): 97.8 (09 Nov 2019 07:48), Max: 98.3 (08 Nov 2019 16:18)  HR: 62 (09 Nov 2019 07:48) (59 - 67)  BP: 111/45 (09 Nov 2019 07:48) (108/48 - 131/52)  BP(mean): --  RR: 18 (09 Nov 2019 07:48) (16 - 18)  SpO2: 97% (09 Nov 2019 07:48) (97% - 98%)    Vascular surgery note, appreciated.    IMP:  Gangrene R second toe with decrease perfusion.  Bypass is being considered vs TMA or BKA          Pain secondary to above          DM, stable          BPH, difficulty voiding, resolved  Plan: Continue current Rx and analgesics.  Vascular surgery and Podiatry f/u.

## 2019-11-09 NOTE — PROGRESS NOTE ADULT - SUBJECTIVE AND OBJECTIVE BOX
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 Pt is in bed, no distress, remains afebrile, no leukocytosis, blood cultures collected on 11/04/19 with no growth. Surgical plan for the pt is right lower extremity bypass vs BKA. Pt complains of pain in his right foot, + erythema, warm to touch, tender, the tip of the 2nd toe is necrotic.     MEDS:  ceFAZolin   IVPB 1000 milliGRAM(s) IV Intermittent every 8 hours    No Known Allergies        VITALS:  Vital Signs Last 24 Hrs  T(C): 36.6 (09 Nov 2019 07:48), Max: 36.8 (08 Nov 2019 16:18)  T(F): 97.8 (09 Nov 2019 07:48), Max: 98.3 (08 Nov 2019 16:18)  HR: 62 (09 Nov 2019 07:48) (59 - 67)  BP: 111/45 (09 Nov 2019 07:48) (108/48 - 131/52)  BP(mean): --  RR: 18 (09 Nov 2019 07:48) (16 - 18)  SpO2: 97% (09 Nov 2019 07:48) (97% - 98%)    LABS/DIAGNOSTIC TESTS:                          10.8   7.88  )-----------( 302      ( 09 Nov 2019 05:49 )             31.8         11-09    141  |  109<H>  |  8   ----------------------------<  111<H>  3.6   |  27  |  0.54    Ca    7.9<L>      09 Nov 2019 05:49  Phos  2.6     11-08  Mg     2.1     11-08        CULTURES:   .Blood  11-04 @ 11:29   No growth to date.  --  --

## 2019-11-09 NOTE — PROGRESS NOTE ADULT - ASSESSMENT
79 year old Kinyarwanda speaking male from home lives alone ambulates with cane at baseline with PMH of DM, HTN and PSH open heart surgery presents with toe pain. Reports R 2nd toe pain and wound since tripping and falling on it at [employer] on october 29th while wearing slippers.

## 2019-11-09 NOTE — CHART NOTE - NSCHARTNOTEFT_GEN_A_CORE
Was signed out by call team that pt didn't void by 8:30pm and bladder scan showed 200ml at 8pm. Checked bladder scan at 10pm and it showed 300ml, we ordered straight cath and pt had 200ml of yosef color urine. Notified by RN that pt urinated 700ml of urine around 6am.

## 2019-11-09 NOTE — PROGRESS NOTE ADULT - SUBJECTIVE AND OBJECTIVE BOX
PGY 1 Note discussed with supervising resident and primary attending    Patient is a 79y old  Male who presents with a chief complaint of 2nd right toe pain (09 Nov 2019 09:24)    INTERVAL HPI/OVERNIGHT EVENTS: Patient seen and examined at the bedside. Patient passed TOV overnight. No other acute events. Patient on heparin drip. Plan for vascular intervention on Tuesday. Patient has pain in right foot but denies any other concerns or complaints at this time.     MEDICATIONS  (STANDING):  acetaminophen  IVPB .. 1000 milliGRAM(s) IV Intermittent once  bisacodyl 5 milliGRAM(s) Oral at bedtime  ceFAZolin   IVPB 1000 milliGRAM(s) IV Intermittent every 8 hours  heparin  Infusion.  Unit(s)/Hr (11 mL/Hr) IV Continuous <Continuous>  influenza   Vaccine 0.5 milliLiter(s) IntraMuscular once  insulin glargine Injectable (LANTUS) 11 Unit(s) SubCutaneous at bedtime  insulin lispro (HumaLOG) corrective regimen sliding scale   SubCutaneous at bedtime  insulin lispro (HumaLOG) corrective regimen sliding scale   SubCutaneous three times a day before meals  insulin lispro Injectable (HumaLOG) 4 Unit(s) SubCutaneous three times a day before meals  lidocaine   Patch 1 Patch Transdermal daily  metoprolol tartrate 12.5 milliGRAM(s) Oral two times a day  senna 2 Tablet(s) Oral at bedtime  simvastatin 40 milliGRAM(s) Oral at bedtime  tamsulosin 0.4 milliGRAM(s) Oral at bedtime    MEDICATIONS  (PRN):  acetaminophen   Tablet .. 650 milliGRAM(s) Oral every 6 hours PRN Mild Pain (1 - 3)  oxycodone    5 mG/acetaminophen 325 mG 1 Tablet(s) Oral every 6 hours PRN Moderate Pain (4 - 6)  oxycodone    5 mG/acetaminophen 325 mG 2 Tablet(s) Oral every 6 hours PRN Severe Pain (7 - 10)      __________________________________________________  REVIEW OF SYSTEMS:  CONSTITUTIONAL: No fever   EYES: no visual disturbances  NECK: No pain    RESPIRATORY: No cough; No shortness of breath  CARDIOVASCULAR: No chest pain   GASTROINTESTINAL: No pain. No nausea or vomiting  NEUROLOGICAL: No headache    MUSCULOSKELETAL: pain in right foot  GENITOURINARY: no dysuria  PSYCHIATRY: no depression   ALL OTHER  ROS negative        Vital Signs Last 24 Hrs  T(C): 36.6 (09 Nov 2019 07:48), Max: 36.8 (08 Nov 2019 16:18)  T(F): 97.8 (09 Nov 2019 07:48), Max: 98.3 (08 Nov 2019 16:18)  HR: 62 (09 Nov 2019 07:48) (59 - 67)  BP: 111/45 (09 Nov 2019 07:48) (108/48 - 131/52)  RR: 18 (09 Nov 2019 07:48) (16 - 18)  SpO2: 97% (09 Nov 2019 07:48) (97% - 98%)    ________________________________________________  PHYSICAL EXAM:  GENERAL: Patient seen resting in bed and in mild distress due to foot pain   HEENT: Normocephalic; conjunctivae and sclerae clear   NECK: supple  CHEST/LUNG: Clear to auscultation bilaterally with good air entry   HEART: S1 S2, regular; no murmurs  ABDOMEN: Soft, Nontender, Nondistended; Bowel sounds present  EXTREMITIES: no edema; no calf tenderness; lidocaine patch on right foot, right 2nd toe discolored, small ulcer present under right 2nd toe  SKIN: warm and dry  NERVOUS SYSTEM: Awake and alert; Oriented to place, person and time     _________________________________________________  LABS:                        10.8   7.88  )-----------( 302      ( 09 Nov 2019 05:49 )             31.8     11-09    141  |  109<H>  |  8   ----------------------------<  111<H>  3.6   |  27  |  0.54    Ca    7.9<L>      09 Nov 2019 05:49  Phos  2.6     11-08  Mg     2.1     11-08      PTT - ( 09 Nov 2019 00:30 )  PTT:94.6 sec    CAPILLARY BLOOD GLUCOSE      POCT Blood Glucose.: 142 mg/dL (09 Nov 2019 08:33)  POCT Blood Glucose.: 248 mg/dL (08 Nov 2019 21:27)  POCT Blood Glucose.: 137 mg/dL (08 Nov 2019 17:03)  POCT Blood Glucose.: 229 mg/dL (08 Nov 2019 11:47)      RADIOLOGY & ADDITIONAL TESTS:    Imaging Personally Reviewed:  YES    No new imaging      Consultant(s) Notes Reviewed:   YES    Care Discussed with Consultants :     Plan of care was discussed with patient and /or primary care giver; all questions and concerns were addressed and care was aligned with patient's wishes.

## 2019-11-09 NOTE — PROGRESS NOTE ADULT - SUBJECTIVE AND OBJECTIVE BOX
79 year old Kazakh speaking male from home with PMH of type 2 DM, CAD s/p CABG, and HTN presented with complaint of right toe pain. Endocrinology was consulted for management of uncontrolled type 2 DM with hyperglycemia, A1c 10.9%. \    Patient seen and examined at bedside with family present. He reports poor appetite but improved right lower extremity pain. FS reviewed. Variable BG likely secondary to patient eating foods from home (family brought in porridge and soy milk packages).       MEDICATIONS  (STANDING):  acetaminophen  IVPB .. 1000 milliGRAM(s) IV Intermittent once  bisacodyl 5 milliGRAM(s) Oral at bedtime  ceFAZolin   IVPB 1000 milliGRAM(s) IV Intermittent every 8 hours  heparin  Infusion.  Unit(s)/Hr (11 mL/Hr) IV Continuous <Continuous>  influenza   Vaccine 0.5 milliLiter(s) IntraMuscular once  insulin glargine Injectable (LANTUS) 11 Unit(s) SubCutaneous at bedtime  insulin lispro (HumaLOG) corrective regimen sliding scale   SubCutaneous at bedtime  insulin lispro (HumaLOG) corrective regimen sliding scale   SubCutaneous three times a day before meals  insulin lispro Injectable (HumaLOG) 4 Unit(s) SubCutaneous three times a day before meals  lidocaine   Patch 1 Patch Transdermal daily  metoprolol tartrate 12.5 milliGRAM(s) Oral two times a day  senna 2 Tablet(s) Oral at bedtime  simvastatin 40 milliGRAM(s) Oral at bedtime  tamsulosin 0.4 milliGRAM(s) Oral at bedtime    MEDICATIONS  (PRN):  acetaminophen   Tablet .. 650 milliGRAM(s) Oral every 6 hours PRN Mild Pain (1 - 3)  oxycodone    5 mG/acetaminophen 325 mG 1 Tablet(s) Oral every 6 hours PRN Moderate Pain (4 - 6)  oxycodone    5 mG/acetaminophen 325 mG 2 Tablet(s) Oral every 6 hours PRN Severe Pain (7 - 10)      REVIEW OF SYSTEMS:  CONSTITUTIONAL: No fever, weight loss, or fatigue  EYES: No eye pain, visual disturbances, or discharge  ENMT:  No difficulty hearing, tinnitus, vertigo; No sinus or throat pain  NECK: No pain or stiffness  RESPIRATORY: No cough, wheezing, chills or hemoptysis; No shortness of breath  CARDIOVASCULAR: No chest pain, palpitations, dizziness, or leg swelling  GASTROINTESTINAL: No abdominal or epigastric pain. No nausea, vomiting, or hematemesis; No diarrhea or constipation. No melena or hematochezia.  GENITOURINARY: No dysuria, frequency, hematuria, or incontinence  NEUROLOGICAL: No headaches, memory loss, loss of strength, numbness, or tremors  SKIN: right second toe wound with pain and darkening of the skin, No itching, burning, or rashes  LYMPH NODES: No enlarged glands  ENDOCRINE: No heat or cold intolerance; No hair loss  MUSCULOSKELETAL: No joint pain or swelling; No muscle, back, or extremity pain  PSYCHIATRIC: No depression, anxiety, mood swings, or difficulty sleeping  HEME/LYMPH: No easy bruising, or bleeding gums  ALLERGY AND IMMUNOLOGIC: No hives or eczema    PHYSICAL EXAM:    VITAL SIGNS  T(C): 36.6 (11-09-19 @ 07:48), Max: 36.8 (11-08-19 @ 16:18)  HR: 62 (11-09-19 @ 07:48) (59 - 67)  BP: 111/45 (11-09-19 @ 07:48) (108/48 - 131/52)  RR: 18 (11-09-19 @ 07:48) (16 - 18)  SpO2: 97% (11-09-19 @ 07:48) (97% - 98%)      General: Alert and oriented. No acute distress.   Eye: Extraocular movements are intact.    HENT:  Normocephalic.    Respiratory: Respirations are non-labored, Symmetrical chest wall expansion.    Gastrointestinal:  Non-distended.    Neurologic:  Alert and oriented X 3, No focal defects, Cranial Nerves II-XII are grossly intact.    Psychiatric:  Cooperative, Appropriate mood & affect.  SKIN: right second toe wound with skin discoloration, no discharge. No rashes or lesions    LABS:                        10.8   7.88  )-----------( 302      ( 09 Nov 2019 05:49 )             31.8     11-09    141  |  109<H>  |  8   ----------------------------<  111<H>  3.6   |  27  |  0.54    Ca    7.9<L>      09 Nov 2019 05:49  Phos  2.6     11-08  Mg     2.1     11-08      PTT - ( 09 Nov 2019 00:30 )  PTT:94.6 sec    CAPILLARY BLOOD GLUCOSE      POCT Blood Glucose.: 142 mg/dL (09 Nov 2019 08:33)  POCT Blood Glucose.: 248 mg/dL (08 Nov 2019 21:27)  POCT Blood Glucose.: 137 mg/dL (08 Nov 2019 17:03)  POCT Blood Glucose.: 229 mg/dL (08 Nov 2019 11:47)

## 2019-11-09 NOTE — PROGRESS NOTE ADULT - PROBLEM SELECTOR PLAN 4
- continue metoprolol     - Monitor BP closely and adjust medications if clinically indicated.  - DASH diet.

## 2019-11-09 NOTE — PROGRESS NOTE ADULT - ASSESSMENT
79 year old Sinhala speaking male from home with PMH of type 2 DM, CAD s/p CABG, and HTN presented with complaint of right toe pain. Endocrinology was consulted for management of uncontrolled type 2 DM with hyperglycemia, A1c 10.9%.     1. Uncontrolled type 2 DM with hyperglycemia   -Pre-bedtime BG above goal >200 secondary to drinking several soy milks before bedtime  -advised patient to only eat food provided by hospital  -continue Lantus 11 units at bedtime  -continue Humalog 4 units TID with meals  -recommend to continue moderate dose mealtime rapid acting insulin as below  BG 70-100mg/dL 0 units  -150 mg/dL 0 units  -200 mg/dL 2 units  -250 mg/dL 4 units  -300 mg/dL 6 units  -350 mg/dL 8 units  -400 mg/dL 10 units  BG > 400mg/dL 12 units  -FS AC HS  -ensure consistent carbohydrate diet   -will monitor FS and adjust accordingly   --if patient is NPO for any reason please change FS and sliding scale to NPO lispro scale     2. Right second toe wound  -wound care as per podiatry  -s/p unsuccessful angioplasty yesterday  -Vascular surgery considering bypass vs BKA.  -continue antibiotics as per ID  -optimize glycemic control    3. HTN  -BP at goal  -continue losartan and metoprolol    Plan discussed with primary team  Please  call  w/ any questions or concerns [phone]

## 2019-11-10 DIAGNOSIS — L97.519 NON-PRESSURE CHRONIC ULCER OF OTHER PART OF RIGHT FOOT WITH UNSPECIFIED SEVERITY: ICD-10-CM

## 2019-11-10 DIAGNOSIS — M79.671 PAIN IN RIGHT FOOT: ICD-10-CM

## 2019-11-10 LAB
ANION GAP SERPL CALC-SCNC: 5 MMOL/L — SIGNIFICANT CHANGE UP (ref 5–17)
APTT BLD: 79.6 SEC — HIGH (ref 27.5–36.3)
BUN SERPL-MCNC: 9 MG/DL — SIGNIFICANT CHANGE UP (ref 7–18)
CALCIUM SERPL-MCNC: 8 MG/DL — LOW (ref 8.4–10.5)
CHLORIDE SERPL-SCNC: 105 MMOL/L — SIGNIFICANT CHANGE UP (ref 96–108)
CO2 SERPL-SCNC: 28 MMOL/L — SIGNIFICANT CHANGE UP (ref 22–31)
CREAT SERPL-MCNC: 0.54 MG/DL — SIGNIFICANT CHANGE UP (ref 0.5–1.3)
GLUCOSE BLDC GLUCOMTR-MCNC: 115 MG/DL — HIGH (ref 70–99)
GLUCOSE BLDC GLUCOMTR-MCNC: 118 MG/DL — HIGH (ref 70–99)
GLUCOSE BLDC GLUCOMTR-MCNC: 235 MG/DL — HIGH (ref 70–99)
GLUCOSE BLDC GLUCOMTR-MCNC: 84 MG/DL — SIGNIFICANT CHANGE UP (ref 70–99)
GLUCOSE SERPL-MCNC: 108 MG/DL — HIGH (ref 70–99)
HCT VFR BLD CALC: 31.8 % — LOW (ref 39–50)
HGB BLD-MCNC: 10.9 G/DL — LOW (ref 13–17)
MCHC RBC-ENTMCNC: 33.4 PG — SIGNIFICANT CHANGE UP (ref 27–34)
MCHC RBC-ENTMCNC: 34.3 GM/DL — SIGNIFICANT CHANGE UP (ref 32–36)
MCV RBC AUTO: 97.5 FL — SIGNIFICANT CHANGE UP (ref 80–100)
NRBC # BLD: 0 /100 WBCS — SIGNIFICANT CHANGE UP (ref 0–0)
PLATELET # BLD AUTO: 298 K/UL — SIGNIFICANT CHANGE UP (ref 150–400)
POTASSIUM SERPL-MCNC: 3.6 MMOL/L — SIGNIFICANT CHANGE UP (ref 3.5–5.3)
POTASSIUM SERPL-SCNC: 3.6 MMOL/L — SIGNIFICANT CHANGE UP (ref 3.5–5.3)
RBC # BLD: 3.26 M/UL — LOW (ref 4.2–5.8)
RBC # FLD: 11.9 % — SIGNIFICANT CHANGE UP (ref 10.3–14.5)
SODIUM SERPL-SCNC: 138 MMOL/L — SIGNIFICANT CHANGE UP (ref 135–145)
WBC # BLD: 8.05 K/UL — SIGNIFICANT CHANGE UP (ref 3.8–10.5)
WBC # FLD AUTO: 8.05 K/UL — SIGNIFICANT CHANGE UP (ref 3.8–10.5)

## 2019-11-10 PROCEDURE — 99233 SBSQ HOSP IP/OBS HIGH 50: CPT | Mod: GC

## 2019-11-10 PROCEDURE — 99231 SBSQ HOSP IP/OBS SF/LOW 25: CPT

## 2019-11-10 RX ORDER — GABAPENTIN 400 MG/1
300 CAPSULE ORAL EVERY 12 HOURS
Refills: 0 | Status: DISCONTINUED | OUTPATIENT
Start: 2019-11-10 | End: 2019-11-14

## 2019-11-10 RX ORDER — ACETAMINOPHEN 500 MG
1000 TABLET ORAL ONCE
Refills: 0 | Status: COMPLETED | OUTPATIENT
Start: 2019-11-11 | End: 2019-11-11

## 2019-11-10 RX ORDER — ACETAMINOPHEN 500 MG
1000 TABLET ORAL ONCE
Refills: 0 | Status: COMPLETED | OUTPATIENT
Start: 2019-11-10 | End: 2019-11-10

## 2019-11-10 RX ADMIN — OXYCODONE HYDROCHLORIDE 10 MILLIGRAM(S): 5 TABLET ORAL at 03:30

## 2019-11-10 RX ADMIN — Medication 4: at 12:15

## 2019-11-10 RX ADMIN — Medication 4 UNIT(S): at 12:15

## 2019-11-10 RX ADMIN — HEPARIN SODIUM 900 UNIT(S)/HR: 5000 INJECTION INTRAVENOUS; SUBCUTANEOUS at 06:19

## 2019-11-10 RX ADMIN — LIDOCAINE 1 PATCH: 4 CREAM TOPICAL at 12:14

## 2019-11-10 RX ADMIN — Medication 4 UNIT(S): at 08:39

## 2019-11-10 RX ADMIN — Medication 1000 MILLIGRAM(S): at 13:24

## 2019-11-10 RX ADMIN — Medication 100 MILLIGRAM(S): at 14:53

## 2019-11-10 RX ADMIN — Medication 400 MILLIGRAM(S): at 22:10

## 2019-11-10 RX ADMIN — INSULIN GLARGINE 11 UNIT(S): 100 INJECTION, SOLUTION SUBCUTANEOUS at 22:07

## 2019-11-10 RX ADMIN — Medication 1000 MILLIGRAM(S): at 07:18

## 2019-11-10 RX ADMIN — Medication 1000 MILLIGRAM(S): at 22:25

## 2019-11-10 RX ADMIN — Medication 100 MILLIGRAM(S): at 05:48

## 2019-11-10 RX ADMIN — Medication 12.5 MILLIGRAM(S): at 05:48

## 2019-11-10 RX ADMIN — Medication 400 MILLIGRAM(S): at 06:21

## 2019-11-10 RX ADMIN — SENNA PLUS 2 TABLET(S): 8.6 TABLET ORAL at 22:09

## 2019-11-10 RX ADMIN — Medication 400 MILLIGRAM(S): at 12:15

## 2019-11-10 RX ADMIN — GABAPENTIN 300 MILLIGRAM(S): 400 CAPSULE ORAL at 17:31

## 2019-11-10 RX ADMIN — Medication 4 UNIT(S): at 17:31

## 2019-11-10 RX ADMIN — SIMVASTATIN 40 MILLIGRAM(S): 20 TABLET, FILM COATED ORAL at 22:10

## 2019-11-10 RX ADMIN — Medication 5 MILLIGRAM(S): at 22:10

## 2019-11-10 RX ADMIN — OXYCODONE HYDROCHLORIDE 10 MILLIGRAM(S): 5 TABLET ORAL at 12:09

## 2019-11-10 RX ADMIN — LIDOCAINE 1 PATCH: 4 CREAM TOPICAL at 19:30

## 2019-11-10 RX ADMIN — OXYCODONE HYDROCHLORIDE 10 MILLIGRAM(S): 5 TABLET ORAL at 04:18

## 2019-11-10 RX ADMIN — Medication 100 MILLIGRAM(S): at 22:10

## 2019-11-10 RX ADMIN — Medication 12.5 MILLIGRAM(S): at 17:31

## 2019-11-10 RX ADMIN — TAMSULOSIN HYDROCHLORIDE 0.4 MILLIGRAM(S): 0.4 CAPSULE ORAL at 22:10

## 2019-11-10 RX ADMIN — Medication 0: at 22:10

## 2019-11-10 RX ADMIN — OXYCODONE HYDROCHLORIDE 10 MILLIGRAM(S): 5 TABLET ORAL at 11:09

## 2019-11-10 NOTE — PROGRESS NOTE ADULT - ASSESSMENT
Cellulitis of right 2nd toe and foot and leg  Mild skin necrosis on plantar aspect of 2nd toe    Plan  - Continue Kefzol 1 gm iv q8hrs (day #6)  - Awaiting for right lower extremity by pass vs BKA

## 2019-11-10 NOTE — PROGRESS NOTE ADULT - ASSESSMENT
79 year old Maori speaking male from home lives alone ambulates with cane at baseline with PMH of DM, HTN and PSH open heart surgery presents with toe pain. Reports R 2nd toe pain and wound since tripping and falling on it at [employer] on october 29th while wearing slippers.

## 2019-11-10 NOTE — PROGRESS NOTE ADULT - SUBJECTIVE AND OBJECTIVE BOX
YEOM, CHANG  79y  Male      Patient is a 79y old  Male who presents with a chief complaint of 2nd right toe pain (09 Nov 2019 15:43)      INTERVAL HPI/OVERNIGHT EVENTS: No acute overnight events.  Patient states his pain is much relieved with medication, but he has pain to touch of his right forefoot.  Patient denies pain in his leg, just his foot at this time.  Patient states he has been voiding and has an appetite.         REVIEW OF SYSTEMS:  CONSTITUTIONAL: No fever   EYES: no visual disturbances  NECK: No pain    RESPIRATORY: No cough; No shortness of breath  CARDIOVASCULAR: No chest pain   GASTROINTESTINAL: No pain. No nausea or vomiting  NEUROLOGICAL: No headache    MUSCULOSKELETAL: pain in right foot  GENITOURINARY: no dysuria  PSYCHIATRY: no depression   ALL OTHER  ROS negative        T(C): 36.6 (11-10-19 @ 07:47), Max: 36.8 (11-10-19 @ 00:18)  HR: 58 (11-10-19 @ 07:47) (58 - 71)  BP: 108/58 (11-10-19 @ 07:47) (108/44 - 141/61)  RR: 17 (11-10-19 @ 07:47) (17 - 18)  SpO2: 98% (11-10-19 @ 07:47) (95% - 98%)  Wt(kg): --Vital Signs Last 24 Hrs  T(C): 36.6 (10 Nov 2019 07:47), Max: 36.8 (10 Nov 2019 00:18)  T(F): 97.9 (10 Nov 2019 07:47), Max: 98.3 (10 Nov 2019 00:18)  HR: 58 (10 Nov 2019 07:47) (58 - 71)  BP: 108/58 (10 Nov 2019 07:47) (108/44 - 141/61)  BP(mean): --  RR: 17 (10 Nov 2019 07:47) (17 - 18)  SpO2: 98% (10 Nov 2019 07:47) (95% - 98%)  No Known Allergies      PHYSICAL EXAM:  GENERAL: Patient seen resting in bed and in mild distress due to foot pain   HEENT: Normocephalic; conjunctivae and sclerae clear   NECK: supple  CHEST/LUNG: Clear to auscultation bilaterally with good air entry   HEART: S1 S2, regular; no murmurs  ABDOMEN: Soft, Nontender, Nondistended; Bowel sounds present  EXTREMITIES: no edema; no calf tenderness; lidocaine patch on right foot, right 2nd toe discolored, small ulcer present under right 2nd toe, erythema dorsal right foot  SKIN: warm and dry  NERVOUS SYSTEM: Awake and alert; Oriented to place, person and time       Consultant(s) Notes Reviewed:  [x ] YES  [ ] NO  Care Discussed with Consultants/Other Providers [ x] YES  [ ] NO    LABS:      RADIOLOGY & ADDITIONAL TESTS:    Imaging Personally Reviewed:  [x] YES  [ ] NO  acetaminophen  IVPB .. 1000 milliGRAM(s) IV Intermittent once  bisacodyl 5 milliGRAM(s) Oral at bedtime  ceFAZolin   IVPB 1000 milliGRAM(s) IV Intermittent every 8 hours  gabapentin 300 milliGRAM(s) Oral at bedtime  heparin  Infusion.  Unit(s)/Hr IV Continuous <Continuous>  influenza   Vaccine 0.5 milliLiter(s) IntraMuscular once  insulin glargine Injectable (LANTUS) 11 Unit(s) SubCutaneous at bedtime  insulin lispro (HumaLOG) corrective regimen sliding scale   SubCutaneous at bedtime  insulin lispro (HumaLOG) corrective regimen sliding scale   SubCutaneous three times a day before meals  insulin lispro Injectable (HumaLOG) 4 Unit(s) SubCutaneous three times a day before meals  lidocaine   Patch 1 Patch Transdermal daily  metoprolol tartrate 12.5 milliGRAM(s) Oral two times a day  oxyCODONE    IR 10 milliGRAM(s) Oral every 6 hours PRN  senna 2 Tablet(s) Oral at bedtime  simvastatin 40 milliGRAM(s) Oral at bedtime  tamsulosin 0.4 milliGRAM(s) Oral at bedtime      HEALTH ISSUES - PROBLEM Dx:  Dehydration  Hyperglycemia  Dehydration  Dehydration  Goals of care, counseling/discussion: Goals of care, counseling/discussion  Prophylactic measure: Prophylactic measure  Cellulitis of toe of right foot: Cellulitis of toe of right foot  PVD (peripheral vascular disease): PVD (peripheral vascular disease)  Diabetes: Diabetes  HTN (hypertension): HTN (hypertension) YEOM, CHANG  79y Male      Patient is a 79y old  Male who presents with a chief complaint of 2nd right toe pain (09 Nov 2019 15:43)      INTERVAL HPI/OVERNIGHT EVENTS: No acute overnight events.  Patient states his pain is much relieved with medication, but he has pain to touch of his right forefoot.  Patient denies pain in his leg, just his foot at this time.  Patient states he has been voiding and has an appetite.         REVIEW OF SYSTEMS:  CONSTITUTIONAL: No fever   EYES: no visual disturbances  NECK: No pain    RESPIRATORY: No cough; No shortness of breath  CARDIOVASCULAR: No chest pain   GASTROINTESTINAL: No pain. No nausea or vomiting  NEUROLOGICAL: No headache    MUSCULOSKELETAL: pain in right foot  GENITOURINARY: no dysuria  PSYCHIATRY: no depression   ALL OTHER  ROS negative        T(C): 36.6 (11-10-19 @ 07:47), Max: 36.8 (11-10-19 @ 00:18)  HR: 58 (11-10-19 @ 07:47) (58 - 71)  BP: 108/58 (11-10-19 @ 07:47) (108/44 - 141/61)  RR: 17 (11-10-19 @ 07:47) (17 - 18)  SpO2: 98% (11-10-19 @ 07:47) (95% - 98%)  Wt(kg): --Vital Signs Last 24 Hrs  T(C): 36.6 (10 Nov 2019 07:47), Max: 36.8 (10 Nov 2019 00:18)  T(F): 97.9 (10 Nov 2019 07:47), Max: 98.3 (10 Nov 2019 00:18)  HR: 58 (10 Nov 2019 07:47) (58 - 71)  BP: 108/58 (10 Nov 2019 07:47) (108/44 - 141/61)  BP(mean): --  RR: 17 (10 Nov 2019 07:47) (17 - 18)  SpO2: 98% (10 Nov 2019 07:47) (95% - 98%)  No Known Allergies      PHYSICAL EXAM:  GENERAL: Patient seen resting in bed and in mild distress due to foot pain   HEENT: Normocephalic; conjunctivae and sclerae clear   NECK: supple  CHEST/LUNG: Clear to auscultation bilaterally with good air entry   HEART: S1 S2, regular; no murmurs  ABDOMEN: Soft, Nontender, Nondistended; Bowel sounds present  EXTREMITIES: no edema; no calf tenderness; lidocaine patch on right foot, right 2nd toe discolored, small ulcer present under right 2nd toe, erythema dorsal right foot  SKIN: warm and dry  NERVOUS SYSTEM: Awake and alert; Oriented to place, person and time       Consultant(s) Notes Reviewed:  [x ] YES  [ ] NO  Care Discussed with Consultants/Other Providers [ x] YES  [ ] NO    LABS:      RADIOLOGY & ADDITIONAL TESTS:    Imaging Personally Reviewed:  [x] YES  [ ] NO  acetaminophen  IVPB .. 1000 milliGRAM(s) IV Intermittent once  bisacodyl 5 milliGRAM(s) Oral at bedtime  ceFAZolin   IVPB 1000 milliGRAM(s) IV Intermittent every 8 hours  gabapentin 300 milliGRAM(s) Oral at bedtime  heparin  Infusion.  Unit(s)/Hr IV Continuous <Continuous>  influenza   Vaccine 0.5 milliLiter(s) IntraMuscular once  insulin glargine Injectable (LANTUS) 11 Unit(s) SubCutaneous at bedtime  insulin lispro (HumaLOG) corrective regimen sliding scale   SubCutaneous at bedtime  insulin lispro (HumaLOG) corrective regimen sliding scale   SubCutaneous three times a day before meals  insulin lispro Injectable (HumaLOG) 4 Unit(s) SubCutaneous three times a day before meals  lidocaine   Patch 1 Patch Transdermal daily  metoprolol tartrate 12.5 milliGRAM(s) Oral two times a day  oxyCODONE    IR 10 milliGRAM(s) Oral every 6 hours PRN  senna 2 Tablet(s) Oral at bedtime  simvastatin 40 milliGRAM(s) Oral at bedtime  tamsulosin 0.4 milliGRAM(s) Oral at bedtime      HEALTH ISSUES - PROBLEM Dx:  Dehydration  Hyperglycemia  Dehydration  Dehydration  Goals of care, counseling/discussion: Goals of care, counseling/discussion  Prophylactic measure: Prophylactic measure  Cellulitis of toe of right foot: Cellulitis of toe of right foot  PVD (peripheral vascular disease): PVD (peripheral vascular disease)  Diabetes: Diabetes  HTN (hypertension): HTN (hypertension)

## 2019-11-10 NOTE — PROGRESS NOTE ADULT - PROBLEM SELECTOR PLAN 1
- p/w right 2nd Toe and Cellulitis with erythema, swelling, distally purple  - No Leukocytosis   - Afebrile   - c/w cefazolin as per ID  - LESLIE reviewed- 0.71 on the right foot, moderate   -s/p unsuccessful angioplasty  -plan for OR on tuesday for possible bypass vs BKA vs. TMA  -f/u vascular  -heparin drip over weekend  -f/u pain management [place]

## 2019-11-10 NOTE — PROGRESS NOTE ADULT - SUBJECTIVE AND OBJECTIVE BOX
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 Pt is in bed, no distress, complains of pain in his left foot, asking for pain medication, RN notified. Pt remains afebrile, no leukocytosis, blood cultures collected on 11/04/19 negative. Surgical plan for the pt is right lower extremity bypass vs BKA. Pt's right foot, erythema and warmth are improving,  to touch, the tip of the 2nd toe is necrotic.     MEDS:  ceFAZolin   IVPB 1000 milliGRAM(s) IV Intermittent every 8 hours    No Known Allergies    Intolerances        VITALS:  Vital Signs Last 24 Hrs  T(C): 36.6 (10 Nov 2019 07:47), Max: 36.8 (10 Nov 2019 00:18)  T(F): 97.9 (10 Nov 2019 07:47), Max: 98.3 (10 Nov 2019 00:18)  HR: 58 (10 Nov 2019 07:47) (58 - 71)  BP: 108/58 (10 Nov 2019 07:47) (108/44 - 141/61)  BP(mean): --  RR: 17 (10 Nov 2019 07:47) (17 - 18)  SpO2: 98% (10 Nov 2019 07:47) (95% - 98%)    LABS/DIAGNOSTIC TESTS:                          10.9   8.05  )-----------( 298      ( 10 Nov 2019 05:58 )             31.8         11-10    138  |  105  |  9   ----------------------------<  108<H>  3.6   |  28  |  0.54    Ca    8.0<L>      10 Nov 2019 05:58        CULTURES:   .Blood  11-04 @ 11:29   No growth at 5 days.  --  --

## 2019-11-10 NOTE — PROGRESS NOTE ADULT - ATTENDING COMMENTS
80 yo M with DM, htn, hx CAD presented with pain to right foot second toe. Symptoms started after tripping in a [employer] approx 2 weeks ago. Toe is gangernous at the tip. Pt placed on heparin drip and evaluated by vascular team. Angiogram with angioplasty was attempted on 11/8/19 but was unsuccessful. Pt is now pending possible lower extremity bypass vs TMA vs BKA. Vascular to follow up.

## 2019-11-10 NOTE — PROGRESS NOTE ADULT - ASSESSMENT
79 year old Arabic speaking male from home with PMH of type 2 DM, CAD s/p CABG, and HTN presented with complaint of right toe pain. Endocrinology was consulted for management of uncontrolled type 2 DM with hyperglycemia, A1c 10.9%.     1. Uncontrolled type 2 DM with hyperglycemia   -BG overall well controlled with few excursions >200 likely secondary to eating food from home  -continue Lantus 11 units at bedtime  -continue Humalog 4 units TID with meals  -recommend to continue moderate dose mealtime rapid acting insulin as below  BG 70-100mg/dL 0 units  -150 mg/dL 0 units  -200 mg/dL 2 units  -250 mg/dL 4 units  -300 mg/dL 6 units  -350 mg/dL 8 units  -400 mg/dL 10 units  BG > 400mg/dL 12 units  -FS AC HS  -ensure consistent carbohydrate diet   -will monitor FS and adjust accordingly   --if patient is NPO for any reason please change FS and sliding scale to NPO lispro scale     2. Right second toe wound  -wound care as per podiatry  -s/p unsuccessful angioplasty   -Vascular surgery considering bypass vs BKA.  -continue antibiotics as per ID  -optimize glycemic control    3. HTN  -BP at goal  -continue losartan and metoprolol    Plan discussed with primary team  Please  call  w/ any questions or concerns [phone]

## 2019-11-10 NOTE — PROGRESS NOTE ADULT - SUBJECTIVE AND OBJECTIVE BOX
79 year old Greek speaking male from home with PMH of type 2 DM, CAD s/p CABG, and HTN presented with complaint of right toe pain. Endocrinology was consulted for management of uncontrolled type 2 DM with hyperglycemia, A1c 10.9%.     Patient seen and examined at bedside. He reports minimal pain at right lower extremity. He has food from home (including soup, rice, and meat) at bedside. He also has snacks like cereal and soy milk. FS reviewed. BG overall well controlled with few excursions >200.      MEDICATIONS  (STANDING):  bisacodyl 5 milliGRAM(s) Oral at bedtime  ceFAZolin   IVPB 1000 milliGRAM(s) IV Intermittent every 8 hours  gabapentin 300 milliGRAM(s) Oral at bedtime  heparin  Infusion.  Unit(s)/Hr (11 mL/Hr) IV Continuous <Continuous>  influenza   Vaccine 0.5 milliLiter(s) IntraMuscular once  insulin glargine Injectable (LANTUS) 11 Unit(s) SubCutaneous at bedtime  insulin lispro (HumaLOG) corrective regimen sliding scale   SubCutaneous at bedtime  insulin lispro (HumaLOG) corrective regimen sliding scale   SubCutaneous three times a day before meals  insulin lispro Injectable (HumaLOG) 4 Unit(s) SubCutaneous three times a day before meals  lidocaine   Patch 1 Patch Transdermal daily  metoprolol tartrate 12.5 milliGRAM(s) Oral two times a day  senna 2 Tablet(s) Oral at bedtime  simvastatin 40 milliGRAM(s) Oral at bedtime  tamsulosin 0.4 milliGRAM(s) Oral at bedtime    MEDICATIONS  (PRN):  oxyCODONE    IR 10 milliGRAM(s) Oral every 6 hours PRN Severe Pain (7 - 10)      REVIEW OF SYSTEMS:  CONSTITUTIONAL: No fever, weight loss, or fatigue  EYES: No eye pain, visual disturbances, or discharge  ENMT:  No difficulty hearing, tinnitus, vertigo; No sinus or throat pain  NECK: No pain or stiffness  RESPIRATORY: No cough, wheezing, chills or hemoptysis; No shortness of breath  CARDIOVASCULAR: No chest pain, palpitations, dizziness, or leg swelling  GASTROINTESTINAL: No abdominal or epigastric pain. No nausea, vomiting, or hematemesis; No diarrhea or constipation. No melena or hematochezia.  GENITOURINARY: No dysuria, frequency, hematuria, or incontinence  NEUROLOGICAL: No headaches, memory loss, loss of strength, numbness, or tremors  SKIN: right second toe wound with pain and darkening of the skin, No itching, burning, or rashes  LYMPH NODES: No enlarged glands  ENDOCRINE: No heat or cold intolerance; No hair loss  MUSCULOSKELETAL: No joint pain or swelling; No muscle, back, or extremity pain  PSYCHIATRIC: No depression, anxiety, mood swings, or difficulty sleeping  HEME/LYMPH: No easy bruising, or bleeding gums  ALLERGY AND IMMUNOLOGIC: No hives or eczema    PHYSICAL EXAM:      VITAL SIGNS  T(C): 36.6 (11-10-19 @ 07:47), Max: 36.8 (11-10-19 @ 00:18)  HR: 58 (11-10-19 @ 07:47) (58 - 71)  BP: 108/58 (11-10-19 @ 07:47) (108/44 - 141/61)  RR: 17 (11-10-19 @ 07:47) (17 - 18)  SpO2: 98% (11-10-19 @ 07:47) (95% - 98%)    General: Alert and oriented. No acute distress.   Eye: Extraocular movements are intact.    HENT:  Normocephalic.    Respiratory: Respirations are non-labored, Symmetrical chest wall expansion.    Gastrointestinal:  Non-distended.    Neurologic:  Alert and oriented X 3, No focal defects, Cranial Nerves II-XII are grossly intact.    Psychiatric:  Cooperative, Appropriate mood & affect.  SKIN: right second toe wound with skin discoloration, no discharge. No rashes or lesions    LABS:                        10.9   8.05  )-----------( 298      ( 10 Nov 2019 05:58 )             31.8     11-10    138  |  105  |  9   ----------------------------<  108<H>  3.6   |  28  |  0.54    Ca    8.0<L>      10 Nov 2019 05:58      PTT - ( 10 Nov 2019 05:58 )  PTT:79.6 sec    CAPILLARY BLOOD GLUCOSE      POCT Blood Glucose.: 235 mg/dL (10 Nov 2019 11:37)  POCT Blood Glucose.: 118 mg/dL (10 Nov 2019 08:22)  POCT Blood Glucose.: 170 mg/dL (09 Nov 2019 21:09)  POCT Blood Glucose.: 172 mg/dL (09 Nov 2019 16:56)

## 2019-11-11 ENCOUNTER — TRANSCRIPTION ENCOUNTER (OUTPATIENT)
Age: 79
End: 2019-11-11

## 2019-11-11 LAB
ANION GAP SERPL CALC-SCNC: 5 MMOL/L — SIGNIFICANT CHANGE UP (ref 5–17)
APTT BLD: 79.5 SEC — HIGH (ref 27.5–36.3)
BUN SERPL-MCNC: 9 MG/DL — SIGNIFICANT CHANGE UP (ref 7–18)
CALCIUM SERPL-MCNC: 8.3 MG/DL — LOW (ref 8.4–10.5)
CHLORIDE SERPL-SCNC: 107 MMOL/L — SIGNIFICANT CHANGE UP (ref 96–108)
CO2 SERPL-SCNC: 28 MMOL/L — SIGNIFICANT CHANGE UP (ref 22–31)
CREAT SERPL-MCNC: 0.66 MG/DL — SIGNIFICANT CHANGE UP (ref 0.5–1.3)
CRP SERPL-MCNC: 6.73 MG/DL — HIGH (ref 0–0.4)
GLUCOSE BLDC GLUCOMTR-MCNC: 121 MG/DL — HIGH (ref 70–99)
GLUCOSE BLDC GLUCOMTR-MCNC: 225 MG/DL — HIGH (ref 70–99)
GLUCOSE BLDC GLUCOMTR-MCNC: 283 MG/DL — HIGH (ref 70–99)
GLUCOSE BLDC GLUCOMTR-MCNC: 89 MG/DL — SIGNIFICANT CHANGE UP (ref 70–99)
GLUCOSE SERPL-MCNC: 132 MG/DL — HIGH (ref 70–99)
HCT VFR BLD CALC: 33.3 % — LOW (ref 39–50)
HGB BLD-MCNC: 11.7 G/DL — LOW (ref 13–17)
MCHC RBC-ENTMCNC: 33.9 PG — SIGNIFICANT CHANGE UP (ref 27–34)
MCHC RBC-ENTMCNC: 35.1 GM/DL — SIGNIFICANT CHANGE UP (ref 32–36)
MCV RBC AUTO: 96.5 FL — SIGNIFICANT CHANGE UP (ref 80–100)
NRBC # BLD: 0 /100 WBCS — SIGNIFICANT CHANGE UP (ref 0–0)
PLATELET # BLD AUTO: 319 K/UL — SIGNIFICANT CHANGE UP (ref 150–400)
POTASSIUM SERPL-MCNC: 3.6 MMOL/L — SIGNIFICANT CHANGE UP (ref 3.5–5.3)
POTASSIUM SERPL-SCNC: 3.6 MMOL/L — SIGNIFICANT CHANGE UP (ref 3.5–5.3)
RBC # BLD: 3.45 M/UL — LOW (ref 4.2–5.8)
RBC # FLD: 11.9 % — SIGNIFICANT CHANGE UP (ref 10.3–14.5)
SODIUM SERPL-SCNC: 140 MMOL/L — SIGNIFICANT CHANGE UP (ref 135–145)
WBC # BLD: 11.83 K/UL — HIGH (ref 3.8–10.5)
WBC # FLD AUTO: 11.83 K/UL — HIGH (ref 3.8–10.5)

## 2019-11-11 PROCEDURE — 99232 SBSQ HOSP IP/OBS MODERATE 35: CPT

## 2019-11-11 PROCEDURE — 99223 1ST HOSP IP/OBS HIGH 75: CPT

## 2019-11-11 PROCEDURE — 99232 SBSQ HOSP IP/OBS MODERATE 35: CPT | Mod: GC

## 2019-11-11 RX ORDER — CHLORHEXIDINE GLUCONATE 213 G/1000ML
1 SOLUTION TOPICAL
Refills: 0 | Status: COMPLETED | OUTPATIENT
Start: 2019-11-11 | End: 2019-11-12

## 2019-11-11 RX ORDER — INSULIN GLARGINE 100 [IU]/ML
8 INJECTION, SOLUTION SUBCUTANEOUS AT BEDTIME
Refills: 0 | Status: DISCONTINUED | OUTPATIENT
Start: 2019-11-11 | End: 2019-11-13

## 2019-11-11 RX ADMIN — OXYCODONE HYDROCHLORIDE 10 MILLIGRAM(S): 5 TABLET ORAL at 06:51

## 2019-11-11 RX ADMIN — Medication 4: at 17:21

## 2019-11-11 RX ADMIN — Medication 6: at 12:40

## 2019-11-11 RX ADMIN — LIDOCAINE 1 PATCH: 4 CREAM TOPICAL at 12:40

## 2019-11-11 RX ADMIN — CHLORHEXIDINE GLUCONATE 1 APPLICATION(S): 213 SOLUTION TOPICAL at 17:25

## 2019-11-11 RX ADMIN — LIDOCAINE 1 PATCH: 4 CREAM TOPICAL at 19:30

## 2019-11-11 RX ADMIN — Medication 4 UNIT(S): at 17:21

## 2019-11-11 RX ADMIN — Medication 12.5 MILLIGRAM(S): at 17:22

## 2019-11-11 RX ADMIN — Medication 100 MILLIGRAM(S): at 14:25

## 2019-11-11 RX ADMIN — LIDOCAINE 1 PATCH: 4 CREAM TOPICAL at 00:45

## 2019-11-11 RX ADMIN — OXYCODONE HYDROCHLORIDE 10 MILLIGRAM(S): 5 TABLET ORAL at 16:55

## 2019-11-11 RX ADMIN — Medication 4 UNIT(S): at 08:43

## 2019-11-11 RX ADMIN — Medication 1000 MILLIGRAM(S): at 09:00

## 2019-11-11 RX ADMIN — Medication 400 MILLIGRAM(S): at 08:42

## 2019-11-11 RX ADMIN — Medication 100 MILLIGRAM(S): at 06:48

## 2019-11-11 RX ADMIN — Medication 100 MILLIGRAM(S): at 22:36

## 2019-11-11 RX ADMIN — GABAPENTIN 300 MILLIGRAM(S): 400 CAPSULE ORAL at 17:22

## 2019-11-11 RX ADMIN — SENNA PLUS 2 TABLET(S): 8.6 TABLET ORAL at 22:37

## 2019-11-11 RX ADMIN — OXYCODONE HYDROCHLORIDE 10 MILLIGRAM(S): 5 TABLET ORAL at 16:25

## 2019-11-11 RX ADMIN — OXYCODONE HYDROCHLORIDE 10 MILLIGRAM(S): 5 TABLET ORAL at 22:36

## 2019-11-11 RX ADMIN — OXYCODONE HYDROCHLORIDE 10 MILLIGRAM(S): 5 TABLET ORAL at 23:15

## 2019-11-11 RX ADMIN — Medication 4 UNIT(S): at 12:40

## 2019-11-11 RX ADMIN — INSULIN GLARGINE 8 UNIT(S): 100 INJECTION, SOLUTION SUBCUTANEOUS at 22:39

## 2019-11-11 RX ADMIN — HEPARIN SODIUM 900 UNIT(S)/HR: 5000 INJECTION INTRAVENOUS; SUBCUTANEOUS at 07:04

## 2019-11-11 RX ADMIN — OXYCODONE HYDROCHLORIDE 10 MILLIGRAM(S): 5 TABLET ORAL at 07:32

## 2019-11-11 RX ADMIN — GABAPENTIN 300 MILLIGRAM(S): 400 CAPSULE ORAL at 06:49

## 2019-11-11 RX ADMIN — Medication 12.5 MILLIGRAM(S): at 06:49

## 2019-11-11 RX ADMIN — Medication 5 MILLIGRAM(S): at 22:37

## 2019-11-11 RX ADMIN — SIMVASTATIN 40 MILLIGRAM(S): 20 TABLET, FILM COATED ORAL at 22:37

## 2019-11-11 NOTE — PROGRESS NOTE ADULT - SUBJECTIVE AND OBJECTIVE BOX
79 year old Slovenian speaking male from home with PMH of type 2 DM, CAD s/p CABG, and HTN presented with complaint of right toe pain. Endocrinology was consulted for management of uncontrolled type 2 DM with hyperglycemia, A1c 10.9%.     Patient seen and examined at bedside. He is feeling well overall but has more right lower extremity pain today. He does not like the hospital food but has mostly been eating snacks from home resulting in variable BG. FS overall remains well controlled on current regimen. Plan for repeat angio tomorrow, patient will be NPO after breakfast for procedure.    MEDICATIONS  (STANDING):  bisacodyl 5 milliGRAM(s) Oral at bedtime  ceFAZolin   IVPB 1000 milliGRAM(s) IV Intermittent every 8 hours  gabapentin 300 milliGRAM(s) Oral every 12 hours  heparin  Infusion.  Unit(s)/Hr (11 mL/Hr) IV Continuous <Continuous>  influenza   Vaccine 0.5 milliLiter(s) IntraMuscular once  insulin glargine Injectable (LANTUS) 11 Unit(s) SubCutaneous at bedtime  insulin lispro (HumaLOG) corrective regimen sliding scale   SubCutaneous at bedtime  insulin lispro (HumaLOG) corrective regimen sliding scale   SubCutaneous three times a day before meals  insulin lispro Injectable (HumaLOG) 4 Unit(s) SubCutaneous three times a day before meals  lidocaine   Patch 1 Patch Transdermal daily  metoprolol tartrate 12.5 milliGRAM(s) Oral two times a day  senna 2 Tablet(s) Oral at bedtime  simvastatin 40 milliGRAM(s) Oral at bedtime  tamsulosin 0.4 milliGRAM(s) Oral at bedtime    MEDICATIONS  (PRN):  oxyCODONE    IR 10 milliGRAM(s) Oral every 6 hours PRN Severe Pain (7 - 10)      REVIEW OF SYSTEMS:  CONSTITUTIONAL: No fever, weight loss, or fatigue  EYES: No eye pain, visual disturbances, or discharge  ENMT:  No difficulty hearing, tinnitus, vertigo; No sinus or throat pain  NECK: No pain or stiffness  RESPIRATORY: No cough, wheezing, chills or hemoptysis; No shortness of breath  CARDIOVASCULAR: No chest pain, palpitations, dizziness, or leg swelling  GASTROINTESTINAL: No abdominal or epigastric pain. No nausea, vomiting, or hematemesis; No diarrhea or constipation. No melena or hematochezia.  GENITOURINARY: No dysuria, frequency, hematuria, or incontinence  NEUROLOGICAL: No headaches, memory loss, loss of strength, numbness, or tremors  SKIN: right second toe wound with pain and darkening of the skin, No itching, burning, or rashes  LYMPH NODES: No enlarged glands  ENDOCRINE: No heat or cold intolerance; No hair loss  MUSCULOSKELETAL: No joint pain or swelling; No muscle, back, or extremity pain  PSYCHIATRIC: No depression, anxiety, mood swings, or difficulty sleeping  HEME/LYMPH: No easy bruising, or bleeding gums  ALLERGY AND IMMUNOLOGIC: No hives or eczema        PHYSICAL EXAM:    VITAL SIGNS  T(C): 36.9 (11-11-19 @ 08:23), Max: 36.9 (11-11-19 @ 08:23)  HR: 57 (11-11-19 @ 08:23) (57 - 78)  BP: 130/50 (11-11-19 @ 08:23) (126/45 - 131/54)  RR: 17 (11-11-19 @ 08:23) (16 - 17)  SpO2: 96% (11-11-19 @ 08:23) (95% - 96%)    General: Alert and oriented. No acute distress.   Eye: Extraocular movements are intact.    HENT:  Normocephalic.    Respiratory: Respirations are non-labored, Symmetrical chest wall expansion.    Gastrointestinal:  Non-distended.    Neurologic:  Alert and oriented X 3, No focal defects, Cranial Nerves II-XII are grossly intact.    Psychiatric:  Cooperative, Appropriate mood & affect.  SKIN: right second toe wound with skin discoloration, dressing in place, No rashes or lesions    LABS:                        11.7   11.83 )-----------( 319      ( 11 Nov 2019 06:09 )             33.3     11-11    140  |  107  |  9   ----------------------------<  132<H>  3.6   |  28  |  0.66    Ca    8.3<L>      11 Nov 2019 06:09      PTT - ( 11 Nov 2019 06:09 )  PTT:79.5 sec    CAPILLARY BLOOD GLUCOSE      POCT Blood Glucose.: 283 mg/dL (11 Nov 2019 12:06)  POCT Blood Glucose.: 121 mg/dL (11 Nov 2019 08:25)  POCT Blood Glucose.: 84 mg/dL (10 Nov 2019 21:31)  POCT Blood Glucose.: 115 mg/dL (10 Nov 2019 16:46)

## 2019-11-11 NOTE — CONSULT NOTE ADULT - ATTENDING COMMENTS
Thank you for the courtesy of a consultation, please contact me for any additional questions
Seen ex'ed dw'ed PA  agree w following edits  New acute onset R 2nd toe pain, discoloration, swelling.. S/p blunt trauma.  No previous hazel, rest pain, ulcers  Ambulatory    DM, HTn, CAD  CABG    No AC or antiplt meds    Non-smoker    AAO, NAD, ANAYA  No JVD/icterus  Abd: S/NT, No pulsatilty  LEs:   R 2nd toe purple discoloration, dist bruising, plantar dry eschar,  Forfoot erythema (improving per pt)  Palp rad, fem, pop pulses,   Pedal pulses NP    A/P:   PAD  Traumatic R toe injury/ischemic changes/cellulitis    Rec:   Med mgmt  Local care/podiatry fu  Check LESLIE/PVRs- if sig def then will need angio +/- intervention if toe does not cont to improve.
I agree with above

## 2019-11-11 NOTE — PROGRESS NOTE ADULT - SUBJECTIVE AND OBJECTIVE BOX
YEOM, CHANG  79y  Male      Patient is a 79y old  Male who presents with a chief complaint of 2nd right toe pain (10 Nov 2019 15:20)      INTERVAL HPI/OVERNIGHT EVENTS: Patient denies any acute overnight events.  Patient states his right foot continues to remain painful. Patient understands that the vascular surgery team has a plan for tuesday.  Patient states that other than his right foot, he has no other complaints.        REVIEW OF SYSTEMS:  CONSTITUTIONAL: No fever   EYES: no visual disturbances  NECK: No pain    RESPIRATORY: No cough; No shortness of breath  CARDIOVASCULAR: No chest pain   GASTROINTESTINAL: No pain. No nausea or vomiting  NEUROLOGICAL: No headache    MUSCULOSKELETAL: pain in right foot and leg  GENITOURINARY: no dysuria  PSYCHIATRY: no depression   ALL OTHER  ROS negative      T(C): 36.7 (11-11-19 @ 00:06), Max: 36.7 (11-11-19 @ 00:06)  HR: 58 (11-11-19 @ 05:39) (58 - 78)  BP: 126/47 (11-11-19 @ 05:39) (126/45 - 131/54)  RR: 17 (11-11-19 @ 00:06) (16 - 17)  SpO2: 95% (11-11-19 @ 00:06) (95% - 95%)  Wt(kg): --Vital Signs Last 24 Hrs  T(C): 36.7 (11 Nov 2019 00:06), Max: 36.7 (11 Nov 2019 00:06)  T(F): 98.1 (11 Nov 2019 00:06), Max: 98.1 (11 Nov 2019 00:06)  HR: 58 (11 Nov 2019 05:39) (58 - 78)  BP: 126/47 (11 Nov 2019 05:39) (126/45 - 131/54)  BP(mean): --  RR: 17 (11 Nov 2019 00:06) (16 - 17)  SpO2: 95% (11 Nov 2019 00:06) (95% - 95%)  No Known Allergies    PHYSICAL EXAM:  GENERAL: Patient seen resting in bed and in mild distress due to foot pain   HEENT: Normocephalic; conjunctivae and sclerae clear   NECK: supple  CHEST/LUNG: Clear to auscultation bilaterally with good air entry   HEART: S1 S2, regular; no murmurs  ABDOMEN: Soft, Nontender, Nondistended; Bowel sounds present  EXTREMITIES: no edema; no calf tenderness;  right 2nd toe discolored, small ulcer present under right 2nd toe, slight drainage, erythema dorsal right foot  SKIN: warm and dry  NERVOUS SYSTEM: Awake and alert; Oriented to place, person and time       Consultant(s) Notes Reviewed:  [x ] YES  [ ] NO  Care Discussed with Consultants/Other Providers [ x] YES  [ ] NO    LABS:                        11.7   11.83 )-----------( 319      ( 11 Nov 2019 06:09 )             33.3     11-11    140  |  107  |  9   ----------------------------<  132<H>  3.6   |  28  |  0.66    Ca    8.3<L>      11 Nov 2019 06:09    WBC Count: 11.83 K/uL (11-11 @ 06:09)  WBC Count: 8.05 K/uL (11-10 @ 05:58)  WBC Count: 7.88 K/uL (11-09 @ 05:49)  WBC Count: 7.82 K/uL (11-08 @ 11:13)  WBC Count: 7.62 K/uL (11-08 @ 05:09)    PTT - ( 11 Nov 2019 06:09 )  PTT:79.5 sec    RADIOLOGY & ADDITIONAL TESTS:    Imaging Personally Reviewed:  [ ] YES  [ ] NO  acetaminophen  IVPB .. 1000 milliGRAM(s) IV Intermittent once  bisacodyl 5 milliGRAM(s) Oral at bedtime  ceFAZolin   IVPB 1000 milliGRAM(s) IV Intermittent every 8 hours  gabapentin 300 milliGRAM(s) Oral every 12 hours  heparin  Infusion.  Unit(s)/Hr IV Continuous <Continuous>  influenza   Vaccine 0.5 milliLiter(s) IntraMuscular once  insulin glargine Injectable (LANTUS) 11 Unit(s) SubCutaneous at bedtime  insulin lispro (HumaLOG) corrective regimen sliding scale   SubCutaneous at bedtime  insulin lispro (HumaLOG) corrective regimen sliding scale   SubCutaneous three times a day before meals  insulin lispro Injectable (HumaLOG) 4 Unit(s) SubCutaneous three times a day before meals  lidocaine   Patch 1 Patch Transdermal daily  metoprolol tartrate 12.5 milliGRAM(s) Oral two times a day  oxyCODONE    IR 10 milliGRAM(s) Oral every 6 hours PRN  senna 2 Tablet(s) Oral at bedtime  simvastatin 40 milliGRAM(s) Oral at bedtime  tamsulosin 0.4 milliGRAM(s) Oral at bedtime      HEALTH ISSUES - PROBLEM Dx:  Dehydration  Hyperglycemia  Dehydration  Dehydration  Goals of care, counseling/discussion: Goals of care, counseling/discussion  Prophylactic measure: Prophylactic measure  Cellulitis of toe of right foot: Cellulitis of toe of right foot  PVD (peripheral vascular disease): PVD (peripheral vascular disease)  Diabetes: Diabetes  HTN (hypertension): HTN (hypertension)  Ischemic ulcer of toe of right foot: Ischemic ulcer of toe of right foot  Acute pain of right foot: Acute pain of right foot    < from: VA Physiol Extremity Lower 3+ Level, BI (11.05.19 @ 14:58) >    EXAM:  US PHYSIOL LWR EXT 3+ LEV BI                          PROCEDURE DATE:  11/05/2019      INTERPRETATION:  Clinical indication: Right lower extremity claudication    Comparison: None    Findings: There are biphasic waveforms bilaterally, abnormally dampened   at the level of the metatarsals and left digit. There is no abnormal   segmental pressure gradient in the right calf compatible with focal   segmental disease.    Right LESLIE = 0.71  Left LESLIE = 1.19    IMPRESSION:     Right LESLIE compatible with moderate peripheral vascular disease. Segmental   disease within the right calf.    Normal left LESLIE.    DIANELYS ROLLE M.D., ATTENDING RADIOLOGIST  This document has been electronically signed. Nov 5 2019  3:05PM  < end of copied text >

## 2019-11-11 NOTE — PROGRESS NOTE ADULT - ATTENDING COMMENTS
I have seen and examined the patient. case discussed with house staff in detail. I have reviewed and edited the note where appropriate.    78 yo M with DM, htn, hx CAD presented with pain to right second toe x2 weeks. Pt placed on heparin drip and evaluated by vascular team. Angiogram with angioplasty was attempted on 11/8/19 but was unsuccessful.    Vascular, Podiatry and Endocrine follow ups noted. cardiology consult appreciated.   Planned repeat Angiogram by vascular surgery tomorrow.      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  Lantus cutdown to 8  NPO and preop labs  DC heparin drip at 6 am tomorrow per vascular  Start IV fluids once patient NPO    Case discussed with patient with [place] . All questions answered.

## 2019-11-11 NOTE — PROGRESS NOTE ADULT - PROBLEM SELECTOR PLAN 1
- p/w right 2nd Toe and Cellulitis with erythema, swelling, distally purple  - No Leukocytosis   - Afebrile   - c/w cefazolin as per ID  - LESLIE reviewed- 0.71 on the right foot, moderate   -s/p unsuccessful angioplasty  -plan for OR on tuesday for possible bypass vs BKA vs. TMA  -f/u vascular  -heparin drip over weekend  -Pain management consult reviewed-  -f/u reconsult podiatry - p/w right 2nd Toe and Cellulitis with erythema, swelling, distally purple  - No Leukocytosis   - Afebrile   - c/w cefazolin as per ID  - LESLIE reviewed- 0.71 on the right foot, moderate   -s/p unsuccessful angioplasty  -plan for OR Tuesday PM for repeat angio  -f/u vascular  -heparin drip over weekend  -Pain management consult reviewed-  -f/u reconsult podiatry-reviewed

## 2019-11-11 NOTE — PROGRESS NOTE ADULT - ASSESSMENT
79 year old Romansh speaking male from home lives alone ambulates with cane at baseline with PMH of DM, HTN and PSH open heart surgery presents with toe pain. Reports R 2nd toe pain and wound since tripping and falling on it at [employer] on october 29th while wearing slippers.

## 2019-11-11 NOTE — CONSULT NOTE ADULT - REASON FOR ADMISSION
2nd right toe pain
Toe pain
2nd right toe pain

## 2019-11-11 NOTE — CONSULT NOTE ADULT - SUBJECTIVE AND OBJECTIVE BOX
CHIEF COMPLAINT: Leg pain    HPI: 78 yo M with DM, HTN, HLD, and CAD s/p CABG (based on sternotomy wires noted on CXR) who presented with RLE pain in setting of dry gangrene. Patient    PAST MEDICAL & SURGICAL HISTORY:  As pb  History of open heart surgery    Allergies    No Known Allergies      MEDICATIONS  (STANDING):  bisacodyl 5 milliGRAM(s) Oral at bedtime  ceFAZolin   IVPB 1000 milliGRAM(s) IV Intermittent every 8 hours  gabapentin 300 milliGRAM(s) Oral every 12 hours  heparin  Infusion.  Unit(s)/Hr (11 mL/Hr) IV Continuous <Continuous>  influenza   Vaccine 0.5 milliLiter(s) IntraMuscular once  insulin glargine Injectable (LANTUS) 11 Unit(s) SubCutaneous at bedtime  insulin lispro (HumaLOG) corrective regimen sliding scale   SubCutaneous at bedtime  insulin lispro (HumaLOG) corrective regimen sliding scale   SubCutaneous three times a day before meals  insulin lispro Injectable (HumaLOG) 4 Unit(s) SubCutaneous three times a day before meals  lidocaine   Patch 1 Patch Transdermal daily  metoprolol tartrate 12.5 milliGRAM(s) Oral two times a day  senna 2 Tablet(s) Oral at bedtime  simvastatin 40 milliGRAM(s) Oral at bedtime  tamsulosin 0.4 milliGRAM(s) Oral at bedtime    MEDICATIONS  (PRN):  oxyCODONE    IR 10 milliGRAM(s) Oral every 6 hours PRN Severe Pain (7 - 10)      FAMILY HISTORY:    No family history of premature coronary artery disease or sudden cardiac death    SOCIAL HISTORY:  Smoking-  Alcohol-  Illicit Drug use-    REVIEW OF SYSTEMS:  Constitutional: [ ] fever, [ ]weight loss,  [ ]fatigue  Eyes: [ ] visual changes  Respiratory: [ ]shortness of breath;  [ ] cough, [ ]wheezing, [ ]chills, [ ]hemoptysis  Cardiovascular: [ ] chest pain, [ ]palpitations, [ ]dizziness,  [ ]leg swelling [ ]syncope  Gastrointestinal: [ ] abdominal pain, [ ]nausea, [ ]vomiting,  [ ]diarrhea   Genitourinary: [ ] dysuria, [ ] hematuria  Neurologic: [ ] headaches [ ] tremors  [ ] weakness [ ] lightheadedness  Skin: [ ] itching, [ ]burning, [ ] rashes  Endocrine: [ ] heat or cold intolerance  Musculoskeletal: [ ] joint pain or swelling; [ ] muscle, back, or extremity pain  Psychiatric: [ ] depression, [ ]anxiety, [ ]mood swings, or [ ]difficulty sleeping  Hematologic: [ ] easy bruising, [ ] bleeding gums       [ x] All others negative	  [ ] Unable to obtain    Vital Signs Last 24 Hrs  T(C): 36.9 (11 Nov 2019 08:23), Max: 36.9 (11 Nov 2019 08:23)  T(F): 98.5 (11 Nov 2019 08:23), Max: 98.5 (11 Nov 2019 08:23)  HR: 57 (11 Nov 2019 08:23) (57 - 78)  BP: 130/50 (11 Nov 2019 08:23) (126/45 - 131/54)  BP(mean): --  RR: 17 (11 Nov 2019 08:23) (16 - 17)  SpO2: 96% (11 Nov 2019 08:23) (95% - 96%)  I&O's Summary    10 Nov 2019 07:01  -  11 Nov 2019 07:00  --------------------------------------------------------  IN: 0 mL / OUT: 1200 mL / NET: -1200 mL    PHYSICAL EXAM:  General: No acute distress  HEENT: EOMI, PERRL  Neck: Supple, No JVD  Lungs: Clear to auscultation bilaterally; No rales or wheezing  Heart: Regular rate and rhythm; No murmurs, rubs, or gallops  Abdomen: Nontender, bowel sounds present  Extremities: No clubbing, cyanosis, or edema  Nervous system:  Alert & Oriented X3, no focal deficits  Psychiatric: Normal affect  Skin: No rashes or lesions      LABS:  11-11    140  |  107  |  9   ----------------------------<  132<H>  3.6   |  28  |  0.66    Ca    8.3<L>      11 Nov 2019 06:09      Creatinine Trend: 0.66<--, 0.54<--, 0.54<--, 0.56<--, 0.71<--, 0.83<--                        11.7   11.83 )-----------( 319      ( 11 Nov 2019 06:09 )             33.3     PTT - ( 11 Nov 2019 06:09 )  PTT:79.5 sec    Lipid Panel:       HDL 45    Trigs 99    Cardiac Enzymes: 0.018 troponin I    11-05 [id number] 10.9    RADIOLOGY: < from: Xray Chest 1 View- PORTABLE-Urgent (11.04.19 @ 14:25) >  Impression: No active pulmonary disease  Sternotomy wires    < end of copied text >      ECG [my interpretation]: 11/4/2019 @ 07:43: Sinus rhythm, left axis, LVH with repolarization abnormality    TELEMETRY: n/a    ECHO: < from: Transthoracic Echocardiogram (11.06.19 @ 10:42) >  CONCLUSIONS:  1. Trace mitral regurgitation.  2.  Trace aortic regurgitation.  3. Mild concentric left ventricular hypertrophy.  4. Endocardium not well visualized; grossly normallow left  ventricular systolic function.   Mild diastolic dysfunction  (stage I).  5. Normal right ventricular size and function.    < end of copied text > CHIEF COMPLAINT: Leg pain    HPI: 78 yo M with DM, HTN, HLD, and CAD s/p CABG (over 10 years ago) who presented with RLE pain in setting of dry gangrene. Patient reports that prior to admission he was ambulating without a cane or walker. He was able to ascend a flight of stairs without feeling any chest pain, dyspnea, palpitations, lightheadedness, or syncope. Patient denies LE edema, orthopnea, or PND. Patient reports his DM, HTN, and HLD were all well controlled prior to admission. He has no chest pain.     Above history obtained via myEDmatch  Anvato # [id number]    PAST MEDICAL & SURGICAL HISTORY:  As above    Allergies    No Known Allergies      MEDICATIONS  (STANDING):  bisacodyl 5 milliGRAM(s) Oral at bedtime  ceFAZolin   IVPB 1000 milliGRAM(s) IV Intermittent every 8 hours  gabapentin 300 milliGRAM(s) Oral every 12 hours  heparin  Infusion.  Unit(s)/Hr (11 mL/Hr) IV Continuous <Continuous>  influenza   Vaccine 0.5 milliLiter(s) IntraMuscular once  insulin glargine Injectable (LANTUS) 11 Unit(s) SubCutaneous at bedtime  insulin lispro (HumaLOG) corrective regimen sliding scale   SubCutaneous at bedtime  insulin lispro (HumaLOG) corrective regimen sliding scale   SubCutaneous three times a day before meals  insulin lispro Injectable (HumaLOG) 4 Unit(s) SubCutaneous three times a day before meals  lidocaine   Patch 1 Patch Transdermal daily  metoprolol tartrate 12.5 milliGRAM(s) Oral two times a day  senna 2 Tablet(s) Oral at bedtime  simvastatin 40 milliGRAM(s) Oral at bedtime  tamsulosin 0.4 milliGRAM(s) Oral at bedtime    MEDICATIONS  (PRN):  oxyCODONE    IR 10 milliGRAM(s) Oral every 6 hours PRN Severe Pain (7 - 10)      FAMILY HISTORY:    No family history of premature coronary artery disease or sudden cardiac death    SOCIAL HISTORY:  Smoking-Denies  Alcohol-Denies  Illicit Drug use-Denies    REVIEW OF SYSTEMS:  Constitutional: [ ] fever, [ ]weight loss,  [ ]fatigue  Eyes: [ ] visual changes  Respiratory: [ ]shortness of breath;  [ ] cough, [ ]wheezing, [ ]chills, [ ]hemoptysis  Cardiovascular: [ ] chest pain, [ ]palpitations, [ ]dizziness,  [ ]leg swelling [ ]syncope  Gastrointestinal: [ ] abdominal pain, [ ]nausea, [ ]vomiting,  [ ]diarrhea   Genitourinary: [ ] dysuria, [ ] hematuria  Neurologic: [ ] headaches [ ] tremors  [ ] weakness [ ] lightheadedness  Skin: [ ] itching, [ ]burning, [ ] rashes  Endocrine: [ ] heat or cold intolerance  Musculoskeletal: [ ] joint pain or swelling; [x ] muscle, back, or extremity pain  Psychiatric: [ ] depression, [ ]anxiety, [ ]mood swings, or [ ]difficulty sleeping  Hematologic: [ ] easy bruising, [ ] bleeding gums       [ x] All others negative	  [ ] Unable to obtain    Vital Signs Last 24 Hrs  T(C): 36.9 (11 Nov 2019 08:23), Max: 36.9 (11 Nov 2019 08:23)  T(F): 98.5 (11 Nov 2019 08:23), Max: 98.5 (11 Nov 2019 08:23)  HR: 57 (11 Nov 2019 08:23) (57 - 78)  BP: 130/50 (11 Nov 2019 08:23) (126/45 - 131/54)  BP(mean): --  RR: 17 (11 Nov 2019 08:23) (16 - 17)  SpO2: 96% (11 Nov 2019 08:23) (95% - 96%)  I&O's Summary    10 Nov 2019 07:01  -  11 Nov 2019 07:00  --------------------------------------------------------  IN: 0 mL / OUT: 1200 mL / NET: -1200 mL    PHYSICAL EXAM:  General: No acute distress  HEENT: EOMI, PERRL  Neck: Supple, No JVD  Lungs: Clear to auscultation bilaterally; No rales or wheezing  Heart: Regular rate and rhythm; No murmurs, rubs, or gallops  Abdomen: Nontender, bowel sounds present  Extremities: No clubbing, cyanosis, or edema; RLE in dressing  Nervous system:  Alert & Oriented X3, no focal deficits  Psychiatric: Normal affect  Skin: No rashes or lesions      LABS:  11-11    140  |  107  |  9   ----------------------------<  132<H>  3.6   |  28  |  0.66    Ca    8.3<L>      11 Nov 2019 06:09      Creatinine Trend: 0.66<--, 0.54<--, 0.54<--, 0.56<--, 0.71<--, 0.83<--                        11.7   11.83 )-----------( 319      ( 11 Nov 2019 06:09 )             33.3     PTT - ( 11 Nov 2019 06:09 )  PTT:79.5 sec    Lipid Panel:       HDL 45    Trigs 99    Cardiac Enzymes: 0.018 troponin I    11-05 [id number] 10.9    RADIOLOGY: < from: Xray Chest 1 View- PORTABLE-Urgent (11.04.19 @ 14:25) >  Impression: No active pulmonary disease  Sternotomy wires    < end of copied text >      ECG [my interpretation]: 11/4/2019 @ 07:43: Sinus rhythm, left axis, LVH with repolarization abnormality    TELEMETRY: n/a    ECHO: < from: Transthoracic Echocardiogram (11.06.19 @ 10:42) >  CONCLUSIONS:  1. Trace mitral regurgitation.  2.  Trace aortic regurgitation.  3. Mild concentric left ventricular hypertrophy.  4. Endocardium not well visualized; grossly normallow left  ventricular systolic function.   Mild diastolic dysfunction  (stage I).  5. Normal right ventricular size and function.    < end of copied text >

## 2019-11-11 NOTE — PROGRESS NOTE ADULT - ASSESSMENT
79 year old Polish speaking male from home with PMH of type 2 DM, CAD s/p CABG, and HTN presented with complaint of right toe pain. Endocrinology was consulted for management of uncontrolled type 2 DM with hyperglycemia, A1c 10.9%.     1. Uncontrolled type 2 DM with hyperglycemia   -BG overall well controlled with few excursions >200 likely secondary to eating food from home  -patient will be NPO after breakfast tomorrow for repeat angio  -recommend Lantus 8 units at bedtime (resume Lantus 11 units at bedtime tomorrow post-procedure)  -continue Humalog 4 units TID with meals  -recommend to continue moderate dose mealtime rapid acting insulin as below  BG 70-100mg/dL 0 units  -150 mg/dL 0 units  -200 mg/dL 2 units  -250 mg/dL 4 units  -300 mg/dL 6 units  -350 mg/dL 8 units  -400 mg/dL 10 units  BG > 400mg/dL 12 units  -FS AC HS  -ensure consistent carbohydrate diet   -will monitor FS and adjust accordingly   --if patient is NPO for any reason please change FS and sliding scale to NPO lispro scale     2. Right second toe wound  -wound care as per podiatry  -s/p unsuccessful angioplasty, plan for repeat angio tomorrow   -continue antibiotics as per ID  -optimize glycemic control    3. HTN  -BP at goal  -continue losartan and metoprolol    Plan discussed with primary team  Please  call  w/ any questions or concerns [phone]

## 2019-11-11 NOTE — CONSULT NOTE ADULT - ASSESSMENT
78 yo M with DM, HTN, HLD, and CAD s/p CABG (preserved EF on echo 11/2019) who presented with RLE pain in setting of dry gangrene.     1. Preprocedural evaluation:    2. HTN: On metoprolol  -On losartan at home, can resume here    3. HLD: simvastatin, also vascepa at home, would resume here    ***Note that this is a preliminary note and any recommendations should NOT be carried out until this note is finalized. *** 78 yo M with DM, HTN, HLD, and CAD s/p CABG (preserved EF on echo 11/2019) who presented with RLE pain in setting of dry gangrene. Patient is euvolemic on exam and was able to perform >4 METS of exertion without symptoms prior to his leg injury.     1. Preprocedural evaluation: Patient's Revised Cardiac Risk Index (RCRI) score is 2 ( Class III risk) and Pierson score is 0.82% risk. Patient is at intermediate to high risk of  adverse perioperative cardiac events undergoing intermediate risk surgery. No further cardiac testing is needed prior to patient's surgery. Patient has no acute cardiac contraindications to surgery such as decompensated heart failure, acute MI, unstable arrhythmia, or severe aortic stenosis.   -Beta blocker should be continued perioperatively  -Plavix can be held for procedure if needed and may be resumed when safe to do so from the surgical perspective with respect to postoperative bleeding risk    2. HTN: On metoprolol  -On losartan at home, can resume here    3. HLD: simvastatin, also Vascepa at home, would resume here

## 2019-11-11 NOTE — CHART NOTE - NSCHARTNOTEFT_GEN_A_CORE
Pt is scheduled for repeat RLE angiogram tomorrow 11/12/19.  NPO @ MN and preop labs ordered.  Please hold heparin drip at 6am tomorrow.  Please document medical clearance. Pending cardiac clearance. Pt is scheduled for repeat RLE angiogram tomorrow 11/12/19.  NPO @ 7am tomorrow and preop labs ordered.  Please hold heparin drip at 6am tomorrow.  Please document medical clearance. Pending cardiac clearance.

## 2019-11-11 NOTE — PROGRESS NOTE ADULT - SUBJECTIVE AND OBJECTIVE BOX
Chief Complaint : Patient is a 79y old  Male who presents with a chief complaint of 2nd right toe pain (04 Nov 2019 08:38)    Pt was seen initially bedside in ED holding complaining of pain in his right second toe. Pt states he fell four days ago and came for pain. Pt states he is a diabetic but does not like to take his medications. Pt states he does not see a foot doctor. Pt states he has pain and numbness in his feet and had the pain and numbness before the fall as well.     Pt seen bedside this morning resting. Pt states he has pain that comes and goes in waves. Pt states the pain is better when he is moving around. Pt states the pain is in his second and third toe and radiates up to his ankle. Pt states the pain medication is not helping. Pt states he does not take the dressing off of his foot and leaves it on. He states only the hospital staff takes it off. Student  used for direct communication in [place].     Patient admits to  (-) Fevers, (-) Chills, (-) Nausea, (-) Vomiting, (-) Shortness of Breath      PMH:   PSH:    Allergies:No Known Allergies      Labs:                                   11.7   11.83 )-----------( 319      ( 11 Nov 2019 06:09 )             33.3     11-11    140  |  107  |  9   ----------------------------<  132<H>  3.6   |  28  |  0.66    Ca    8.3<L>      11 Nov 2019 06:09      WBC Trend  WBC Count: 11.83 K/uL (11.11.19 @ 06:09)  9.63 Date (11-04 @ 06:14)      Vital Signs Last 24 Hrs  T(C): 36.9 (11 Nov 2019 08:23), Max: 36.9 (11 Nov 2019 08:23)  T(F): 98.5 (11 Nov 2019 08:23), Max: 98.5 (11 Nov 2019 08:23)  HR: 57 (11 Nov 2019 08:23) (57 - 78)  BP: 130/50 (11 Nov 2019 08:23) (126/45 - 131/54)  BP(mean): --  RR: 17 (11 Nov 2019 08:23) (16 - 17)  SpO2: 96% (11 Nov 2019 08:23) (95% - 96%)      O:   General: Pleasant  male NAD & AOX3.    Integument:  Skin warm, dry and supple bilateral.    Derm: right second digit has demarcated gangrene  -fluctuance, erythema dorsal foot and plantar second digit, 0.5x0.4x0.1cm wound plantar second digit with surrounding erythema, no drainage, -PTB, -malodor + interdigital maceration   Vascular: Dorsalis Pedis and Posterior Tibial pulses 1/4.  Capillary re-fill time less then 3 seconds digits 1-5 bilateral. TG warm to cool b/l, cold to the second digit foot  Neuro: Protective sensation diminished to the level of the digits bilateral.  MSK: Muscle strength 5/5 all major muscle groups bilateral.      A: distal hematoma right second digit   plantar ulcer right second digit       P:   Chart reviewed and Patient evaluated  Discussed diagnosis and treatment with patient  X-rays reviewed   Angiogram reviewed   Vascular following -planning intervention vs BKA  Applied betadine DSD to wound and interdigital maceration    Pt to heel WB right foot, full WB left foot   Discussed importance of daily foot examinations and proper shoe gear and to importance of lower Fasting Blood Glucose levels.   Wound care orders in: apply betadine DSD to right toe and inbetween the first and second interspace daily. keep clean dry and intact.   Discussed with Attending     Please reconsult as needed

## 2019-11-12 LAB
ANION GAP SERPL CALC-SCNC: 4 MMOL/L — LOW (ref 5–17)
APTT BLD: 76.7 SEC — HIGH (ref 27.5–36.3)
BLD GP AB SCN SERPL QL: SIGNIFICANT CHANGE UP
BUN SERPL-MCNC: 10 MG/DL — SIGNIFICANT CHANGE UP (ref 7–18)
CALCIUM SERPL-MCNC: 8.3 MG/DL — LOW (ref 8.4–10.5)
CHLORIDE SERPL-SCNC: 104 MMOL/L — SIGNIFICANT CHANGE UP (ref 96–108)
CO2 SERPL-SCNC: 29 MMOL/L — SIGNIFICANT CHANGE UP (ref 22–31)
CREAT SERPL-MCNC: 0.62 MG/DL — SIGNIFICANT CHANGE UP (ref 0.5–1.3)
GLUCOSE BLDC GLUCOMTR-MCNC: 118 MG/DL — HIGH (ref 70–99)
GLUCOSE BLDC GLUCOMTR-MCNC: 123 MG/DL — HIGH (ref 70–99)
GLUCOSE BLDC GLUCOMTR-MCNC: 135 MG/DL — HIGH (ref 70–99)
GLUCOSE BLDC GLUCOMTR-MCNC: 165 MG/DL — HIGH (ref 70–99)
GLUCOSE BLDC GLUCOMTR-MCNC: 190 MG/DL — HIGH (ref 70–99)
GLUCOSE SERPL-MCNC: 95 MG/DL — SIGNIFICANT CHANGE UP (ref 70–99)
HCT VFR BLD CALC: 31.8 % — LOW (ref 39–50)
HGB BLD-MCNC: 10.9 G/DL — LOW (ref 13–17)
MCHC RBC-ENTMCNC: 33.4 PG — SIGNIFICANT CHANGE UP (ref 27–34)
MCHC RBC-ENTMCNC: 34.3 GM/DL — SIGNIFICANT CHANGE UP (ref 32–36)
MCV RBC AUTO: 97.5 FL — SIGNIFICANT CHANGE UP (ref 80–100)
NRBC # BLD: 0 /100 WBCS — SIGNIFICANT CHANGE UP (ref 0–0)
PLATELET # BLD AUTO: 316 K/UL — SIGNIFICANT CHANGE UP (ref 150–400)
POTASSIUM SERPL-MCNC: 3.9 MMOL/L — SIGNIFICANT CHANGE UP (ref 3.5–5.3)
POTASSIUM SERPL-SCNC: 3.9 MMOL/L — SIGNIFICANT CHANGE UP (ref 3.5–5.3)
RBC # BLD: 3.26 M/UL — LOW (ref 4.2–5.8)
RBC # FLD: 11.8 % — SIGNIFICANT CHANGE UP (ref 10.3–14.5)
SODIUM SERPL-SCNC: 137 MMOL/L — SIGNIFICANT CHANGE UP (ref 135–145)
WBC # BLD: 8.78 K/UL — SIGNIFICANT CHANGE UP (ref 3.8–10.5)
WBC # FLD AUTO: 8.78 K/UL — SIGNIFICANT CHANGE UP (ref 3.8–10.5)

## 2019-11-12 PROCEDURE — 99232 SBSQ HOSP IP/OBS MODERATE 35: CPT

## 2019-11-12 PROCEDURE — 99233 SBSQ HOSP IP/OBS HIGH 50: CPT | Mod: GC

## 2019-11-12 PROCEDURE — 37228: CPT | Mod: GC

## 2019-11-12 RX ORDER — CLOPIDOGREL BISULFATE 75 MG/1
300 TABLET, FILM COATED ORAL ONCE
Refills: 0 | Status: COMPLETED | OUTPATIENT
Start: 2019-11-12 | End: 2019-11-12

## 2019-11-12 RX ORDER — ONDANSETRON 8 MG/1
4 TABLET, FILM COATED ORAL ONCE
Refills: 0 | Status: DISCONTINUED | OUTPATIENT
Start: 2019-11-12 | End: 2019-11-12

## 2019-11-12 RX ORDER — ASPIRIN/CALCIUM CARB/MAGNESIUM 324 MG
81 TABLET ORAL DAILY
Refills: 0 | Status: DISCONTINUED | OUTPATIENT
Start: 2019-11-12 | End: 2019-11-14

## 2019-11-12 RX ORDER — SODIUM CHLORIDE 9 MG/ML
1000 INJECTION, SOLUTION INTRAVENOUS
Refills: 0 | Status: DISCONTINUED | OUTPATIENT
Start: 2019-11-12 | End: 2019-11-12

## 2019-11-12 RX ORDER — CLOPIDOGREL BISULFATE 75 MG/1
75 TABLET, FILM COATED ORAL DAILY
Refills: 0 | Status: DISCONTINUED | OUTPATIENT
Start: 2019-11-13 | End: 2019-11-14

## 2019-11-12 RX ORDER — ASPIRIN/CALCIUM CARB/MAGNESIUM 324 MG
81 TABLET ORAL ONCE
Refills: 0 | Status: COMPLETED | OUTPATIENT
Start: 2019-11-12 | End: 2019-11-12

## 2019-11-12 RX ORDER — HYDROMORPHONE HYDROCHLORIDE 2 MG/ML
0.5 INJECTION INTRAMUSCULAR; INTRAVENOUS; SUBCUTANEOUS
Refills: 0 | Status: DISCONTINUED | OUTPATIENT
Start: 2019-11-12 | End: 2019-11-12

## 2019-11-12 RX ORDER — SODIUM CHLORIDE 9 MG/ML
1000 INJECTION, SOLUTION INTRAVENOUS
Refills: 0 | Status: DISCONTINUED | OUTPATIENT
Start: 2019-11-12 | End: 2019-11-14

## 2019-11-12 RX ORDER — FENTANYL CITRATE 50 UG/ML
25 INJECTION INTRAVENOUS
Refills: 0 | Status: DISCONTINUED | OUTPATIENT
Start: 2019-11-12 | End: 2019-11-12

## 2019-11-12 RX ADMIN — Medication 100 MILLIGRAM(S): at 06:48

## 2019-11-12 RX ADMIN — LIDOCAINE 1 PATCH: 4 CREAM TOPICAL at 22:49

## 2019-11-12 RX ADMIN — SENNA PLUS 2 TABLET(S): 8.6 TABLET ORAL at 22:49

## 2019-11-12 RX ADMIN — TAMSULOSIN HYDROCHLORIDE 0.4 MILLIGRAM(S): 0.4 CAPSULE ORAL at 00:21

## 2019-11-12 RX ADMIN — SIMVASTATIN 40 MILLIGRAM(S): 20 TABLET, FILM COATED ORAL at 22:48

## 2019-11-12 RX ADMIN — INSULIN GLARGINE 8 UNIT(S): 100 INJECTION, SOLUTION SUBCUTANEOUS at 22:48

## 2019-11-12 RX ADMIN — CHLORHEXIDINE GLUCONATE 1 APPLICATION(S): 213 SOLUTION TOPICAL at 06:51

## 2019-11-12 RX ADMIN — GABAPENTIN 300 MILLIGRAM(S): 400 CAPSULE ORAL at 06:49

## 2019-11-12 RX ADMIN — Medication 100 MILLIGRAM(S): at 22:48

## 2019-11-12 RX ADMIN — Medication 12.5 MILLIGRAM(S): at 06:49

## 2019-11-12 RX ADMIN — CLOPIDOGREL BISULFATE 300 MILLIGRAM(S): 75 TABLET, FILM COATED ORAL at 20:47

## 2019-11-12 RX ADMIN — SODIUM CHLORIDE 100 MILLILITER(S): 9 INJECTION, SOLUTION INTRAVENOUS at 11:43

## 2019-11-12 RX ADMIN — LIDOCAINE 1 PATCH: 4 CREAM TOPICAL at 11:46

## 2019-11-12 RX ADMIN — LIDOCAINE 1 PATCH: 4 CREAM TOPICAL at 00:21

## 2019-11-12 RX ADMIN — OXYCODONE HYDROCHLORIDE 10 MILLIGRAM(S): 5 TABLET ORAL at 06:51

## 2019-11-12 RX ADMIN — Medication 5 MILLIGRAM(S): at 22:48

## 2019-11-12 RX ADMIN — OXYCODONE HYDROCHLORIDE 10 MILLIGRAM(S): 5 TABLET ORAL at 07:51

## 2019-11-12 RX ADMIN — TAMSULOSIN HYDROCHLORIDE 0.4 MILLIGRAM(S): 0.4 CAPSULE ORAL at 22:49

## 2019-11-12 RX ADMIN — Medication 81 MILLIGRAM(S): at 20:47

## 2019-11-12 NOTE — PROGRESS NOTE ADULT - PROBLEM SELECTOR PLAN 1
- p/w right 2nd Toe and Cellulitis with erythema, swelling, distally purple  - No Leukocytosis   - Afebrile   - c/w cefazolin as per ID  - LESLIE reviewed- 0.71 on the right foot, moderate   -s/p unsuccessful angioplasty  -plan for OR Today for repeat Angio  -f/u vascular  -heparin drip held at 6AM.   -Pain management consult reviewed-  -f/u reconsult podiatry-reviewed

## 2019-11-12 NOTE — PROGRESS NOTE ADULT - ATTENDING COMMENTS
I have seen and examined the patient. case discussed with house staff in detail. I have reviewed and edited the note where appropriate.    78 yo M with DM, htn, hx CAD presented with pain to right second toe x2 weeks. Pt placed on heparin drip and evaluated by vascular team. Angiogram with angioplasty was attempted on 11/8/19 but was unsuccessful.    Planned repeat Angiogram vs BKA incase angio is unsuccessful.   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

## 2019-11-12 NOTE — PROGRESS NOTE ADULT - ASSESSMENT
79 year old Turkish speaking male from home with PMH of type 2 DM, CAD s/p CABG, and HTN presented with complaint of right toe pain. Endocrinology was consulted for management of uncontrolled type 2 DM with hyperglycemia, A1c 10.9%.     1. Uncontrolled type 2 DM with hyperglycemia   -patient NPO after 7am today for angio in afternoon  -resume Lantus 11 units at bedtime tonight post procedure  -resume Humalog 4 units TID with meals post-procedure when patient is tolerating meals  -recommend to continue moderate dose mealtime rapid acting insulin as below  BG 70-100mg/dL 0 units  -150 mg/dL 0 units  -200 mg/dL 2 units  -250 mg/dL 4 units  -300 mg/dL 6 units  -350 mg/dL 8 units  -400 mg/dL 10 units  BG > 400mg/dL 12 units  -FS AC HS  -ensure consistent carbohydrate diet   -will monitor FS and adjust accordingly   --if patient is NPO for any reason please change FS and sliding scale to NPO lispro scale     2. Right second toe wound  -wound care as per podiatry  -s/p unsuccessful angioplasty  -plan for repeat angio today  -continue antibiotics as per ID  -optimize glycemic control    3. HTN  -BP at goal  -continue losartan and metoprolol    Plan discussed with primary team  Please  call  w/ any questions or concerns [phone]

## 2019-11-12 NOTE — PROGRESS NOTE ADULT - ASSESSMENT
79 year old Icelandic speaking male from home lives alone ambulates with cane at baseline with PMH of DM, HTN and PSH open heart surgery presents with toe pain. Reports R 2nd toe pain and wound since tripping and falling on it at [employer] on october 29th while wearing slippers.

## 2019-11-12 NOTE — PROGRESS NOTE ADULT - SUBJECTIVE AND OBJECTIVE BOX
Clinically stable  vein mapping not done yet  R toe/foot ischemia/rest pain unchanged  Plan Angio/PTA via pedal access.  If not successful- may end up w BKA.  Spoke to pt w Syriac speaking HCP present  Informed consent obtained.

## 2019-11-12 NOTE — PROGRESS NOTE ADULT - PROBLEM SELECTOR PLAN 3
-vascular consult  -unsuccessful angioplasty   -LESLIE reviewed- 0.71 on the right foot, moderate   -OR today for repeat angio with vascular

## 2019-11-12 NOTE — PROGRESS NOTE ADULT - SUBJECTIVE AND OBJECTIVE BOX
YEOM, CHANG  79y  Male      Patient is a 79y old  Male who presents with a chief complaint of 2nd right toe pain (11 Nov 2019 13:36)      INTERVAL HPI/OVERNIGHT EVENTS: Patient denies any acute overnight events.  He states his pain is decreased at the moment but it may be from the pain medication.  He understands he is going for a repeat angiogram today with vascular surgery.      REVIEW OF SYSTEMS:  CONSTITUTIONAL: No fever   EYES: no visual disturbances  NECK: No pain    RESPIRATORY: No cough; No shortness of breath  CARDIOVASCULAR: No chest pain   GASTROINTESTINAL: No pain. No nausea or vomiting.   NEUROLOGICAL: No headache    MUSCULOSKELETAL: pain in right foot and leg  GENITOURINARY: no dysuria  PSYCHIATRY: no depression   ALL OTHER  ROS negative      T(C): 36.8 (11-12-19 @ 08:49), Max: 37.9 (11-11-19 @ 16:21)  HR: 67 (11-12-19 @ 08:49) (60 - 90)  BP: 129/52 (11-12-19 @ 08:49) (120/62 - 138/58)  RR: 16 (11-12-19 @ 08:49) (16 - 18)  SpO2: 97% (11-12-19 @ 08:49) (97% - 99%)  Wt(kg): --Vital Signs Last 24 Hrs  T(C): 36.8 (12 Nov 2019 08:49), Max: 37.9 (11 Nov 2019 16:21)  T(F): 98.3 (12 Nov 2019 08:49), Max: 100.2 (11 Nov 2019 16:21)  HR: 67 (12 Nov 2019 08:49) (60 - 90)  BP: 129/52 (12 Nov 2019 08:49) (120/62 - 138/58)  BP(mean): --  RR: 16 (12 Nov 2019 08:49) (16 - 18)  SpO2: 97% (12 Nov 2019 08:49) (97% - 99%)  No Known Allergies      PHYSICAL EXAM:  GENERAL: Patient seen resting in bed and in mild distress due to foot pain   HEENT: Normocephalic; conjunctivae and sclerae clear   NECK: supple  CHEST/LUNG: Clear to auscultation bilaterally with good air entry   HEART: S1 S2, regular; no murmurs  ABDOMEN: Soft, Nontender, Nondistended; Bowel sounds present  EXTREMITIES: no edema; no calf tenderness;  right 2nd toe discolored, small ulcer present under right 2nd toe, slight drainage, erythema dorsal right foot  SKIN: warm and dry  NERVOUS SYSTEM: Awake and alert; Oriented to place, person and time       Consultant(s) Notes Reviewed:  [x ] YES  [ ] NO  Care Discussed with Consultants/Other Providers [ x] YES  [ ] NO    LABS:                        10.9   8.78  )-----------( 316      ( 12 Nov 2019 06:01 )             31.8     11-12    137  |  104  |  10  ----------------------------<  95  3.9   |  29  |  0.62    Ca    8.3<L>      12 Nov 2019 06:01    WBC Count: 8.78 K/uL (11-12 @ 06:01)  WBC Count: 11.83 K/uL (11-11 @ 06:09)  WBC Count: 8.05 K/uL (11-10 @ 05:58)  WBC Count: 7.88 K/uL (11-09 @ 05:49)  WBC Count: 7.82 K/uL (11-08 @ 11:13)      RADIOLOGY & ADDITIONAL TESTS:    Imaging Personally Reviewed:  [x ] YES  [ ] NO  bisacodyl 5 milliGRAM(s) Oral at bedtime  ceFAZolin   IVPB 1000 milliGRAM(s) IV Intermittent every 8 hours  gabapentin 300 milliGRAM(s) Oral every 12 hours  influenza   Vaccine 0.5 milliLiter(s) IntraMuscular once  insulin glargine Injectable (LANTUS) 8 Unit(s) SubCutaneous at bedtime  insulin lispro (HumaLOG) corrective regimen sliding scale   SubCutaneous at bedtime  insulin lispro (HumaLOG) corrective regimen sliding scale   SubCutaneous three times a day before meals  insulin lispro Injectable (HumaLOG) 4 Unit(s) SubCutaneous three times a day before meals  lidocaine   Patch 1 Patch Transdermal daily  metoprolol tartrate 12.5 milliGRAM(s) Oral two times a day  oxyCODONE    IR 10 milliGRAM(s) Oral every 6 hours PRN  senna 2 Tablet(s) Oral at bedtime  simvastatin 40 milliGRAM(s) Oral at bedtime  tamsulosin 0.4 milliGRAM(s) Oral at bedtime      HEALTH ISSUES - PROBLEM Dx:  Dehydration  Hyperglycemia  Dehydration  Dehydration  Goals of care, counseling/discussion: Goals of care, counseling/discussion  Prophylactic measure: Prophylactic measure  Cellulitis of toe of right foot: Cellulitis of toe of right foot  PVD (peripheral vascular disease): PVD (peripheral vascular disease)  Diabetes: Diabetes  HTN (hypertension): HTN (hypertension)  Ischemic ulcer of toe of right foot: Ischemic ulcer of toe of right foot  Acute pain of right foot: Acute pain of right foot

## 2019-11-12 NOTE — PROGRESS NOTE ADULT - SUBJECTIVE AND OBJECTIVE BOX
79 year old Sinhala speaking male from home with PMH of type 2 DM, CAD s/p CABG, and HTN presented with complaint of right toe pain. Endocrinology was consulted for management of uncontrolled type 2 DM with hyperglycemia, A1c 10.9%.     Patient seen and examined at bedside. He reports mild pain at right lower extremity. He has poor appetite and did not eat breakfast this AM. He is scheduled for repeat angio this afternoon.       MEDICATIONS  (STANDING):  bisacodyl 5 milliGRAM(s) Oral at bedtime  ceFAZolin   IVPB 1000 milliGRAM(s) IV Intermittent every 8 hours  gabapentin 300 milliGRAM(s) Oral every 12 hours  influenza   Vaccine 0.5 milliLiter(s) IntraMuscular once  insulin glargine Injectable (LANTUS) 8 Unit(s) SubCutaneous at bedtime  insulin lispro (HumaLOG) corrective regimen sliding scale   SubCutaneous at bedtime  insulin lispro (HumaLOG) corrective regimen sliding scale   SubCutaneous three times a day before meals  insulin lispro Injectable (HumaLOG) 4 Unit(s) SubCutaneous three times a day before meals  lactated ringers. 1000 milliLiter(s) (100 mL/Hr) IV Continuous <Continuous>  lidocaine   Patch 1 Patch Transdermal daily  metoprolol tartrate 12.5 milliGRAM(s) Oral two times a day  senna 2 Tablet(s) Oral at bedtime  simvastatin 40 milliGRAM(s) Oral at bedtime  tamsulosin 0.4 milliGRAM(s) Oral at bedtime    MEDICATIONS  (PRN):  oxyCODONE    IR 10 milliGRAM(s) Oral every 6 hours PRN Severe Pain (7 - 10)      REVIEW OF SYSTEMS:  CONSTITUTIONAL: No fever, weight loss, or fatigue  EYES: No eye pain, visual disturbances, or discharge  ENMT:  No difficulty hearing, tinnitus, vertigo; No sinus or throat pain  NECK: No pain or stiffness  RESPIRATORY: No cough, wheezing, chills or hemoptysis; No shortness of breath  CARDIOVASCULAR: No chest pain, palpitations, dizziness, or leg swelling  GASTROINTESTINAL: No abdominal or epigastric pain. No nausea, vomiting, or hematemesis; No diarrhea or constipation. No melena or hematochezia.  GENITOURINARY: No dysuria, frequency, hematuria, or incontinence  NEUROLOGICAL: No headaches, memory loss, loss of strength, numbness, or tremors  SKIN: right second toe wound with pain and darkening of the skin, No itching, burning, or rashes  LYMPH NODES: No enlarged glands  ENDOCRINE: No heat or cold intolerance; No hair loss  MUSCULOSKELETAL: No joint pain or swelling; No muscle, back, or extremity pain  PSYCHIATRIC: No depression, anxiety, mood swings, or difficulty sleeping  HEME/LYMPH: No easy bruising, or bleeding gums  ALLERGY AND IMMUNOLOGIC: No hives or eczema      PHYSICAL EXAM:    VITAL SIGNS  T(C): 36.8 (11-12-19 @ 08:49), Max: 37.9 (11-11-19 @ 16:21)  HR: 67 (11-12-19 @ 08:49) (60 - 90)  BP: 129/52 (11-12-19 @ 08:49) (120/62 - 138/58)  RR: 16 (11-12-19 @ 08:49) (16 - 18)  SpO2: 97% (11-12-19 @ 08:49) (97% - 99%)        General: Alert and oriented. No acute distress.   Eye: Extraocular movements are intact.    HENT:  Normocephalic.    Respiratory: Respirations are non-labored, Symmetrical chest wall expansion.    Gastrointestinal:  Non-distended.    Neurologic:  Alert and oriented X 3, No focal defects, Cranial Nerves II-XII are grossly intact.    Psychiatric:  Cooperative, Appropriate mood & affect.  SKIN: right second toe wound with skin discoloration, dressing in place, No rashes or lesions    LABS:                        10.9   8.78  )-----------( 316      ( 12 Nov 2019 06:01 )             31.8     11-12    137  |  104  |  10  ----------------------------<  95  3.9   |  29  |  0.62    Ca    8.3<L>      12 Nov 2019 06:01      PTT - ( 12 Nov 2019 06:01 )  PTT:76.7 sec    CAPILLARY BLOOD GLUCOSE      POCT Blood Glucose.: 190 mg/dL (12 Nov 2019 08:26)  POCT Blood Glucose.: 89 mg/dL (11 Nov 2019 21:47)  POCT Blood Glucose.: 225 mg/dL (11 Nov 2019 16:55)  POCT Blood Glucose.: 283 mg/dL (11 Nov 2019 12:06)

## 2019-11-13 ENCOUNTER — TRANSCRIPTION ENCOUNTER (OUTPATIENT)
Age: 79
End: 2019-11-13

## 2019-11-13 LAB
GLUCOSE BLDC GLUCOMTR-MCNC: 121 MG/DL — HIGH (ref 70–99)
GLUCOSE BLDC GLUCOMTR-MCNC: 148 MG/DL — HIGH (ref 70–99)
GLUCOSE BLDC GLUCOMTR-MCNC: 204 MG/DL — HIGH (ref 70–99)
GLUCOSE BLDC GLUCOMTR-MCNC: 243 MG/DL — HIGH (ref 70–99)
HCT VFR BLD CALC: 31.9 % — LOW (ref 39–50)
HGB BLD-MCNC: 10.8 G/DL — LOW (ref 13–17)
MCHC RBC-ENTMCNC: 32.5 PG — SIGNIFICANT CHANGE UP (ref 27–34)
MCHC RBC-ENTMCNC: 33.9 GM/DL — SIGNIFICANT CHANGE UP (ref 32–36)
MCV RBC AUTO: 96.1 FL — SIGNIFICANT CHANGE UP (ref 80–100)
NRBC # BLD: 0 /100 WBCS — SIGNIFICANT CHANGE UP (ref 0–0)
PLATELET # BLD AUTO: 349 K/UL — SIGNIFICANT CHANGE UP (ref 150–400)
RBC # BLD: 3.32 M/UL — LOW (ref 4.2–5.8)
RBC # FLD: 12 % — SIGNIFICANT CHANGE UP (ref 10.3–14.5)
WBC # BLD: 6.9 K/UL — SIGNIFICANT CHANGE UP (ref 3.8–10.5)
WBC # FLD AUTO: 6.9 K/UL — SIGNIFICANT CHANGE UP (ref 3.8–10.5)

## 2019-11-13 PROCEDURE — 99231 SBSQ HOSP IP/OBS SF/LOW 25: CPT | Mod: GC

## 2019-11-13 PROCEDURE — 99233 SBSQ HOSP IP/OBS HIGH 50: CPT | Mod: GC

## 2019-11-13 PROCEDURE — 99231 SBSQ HOSP IP/OBS SF/LOW 25: CPT

## 2019-11-13 RX ORDER — INSULIN GLARGINE 100 [IU]/ML
8 INJECTION, SOLUTION SUBCUTANEOUS AT BEDTIME
Refills: 0 | Status: DISCONTINUED | OUTPATIENT
Start: 2019-11-13 | End: 2019-11-14

## 2019-11-13 RX ORDER — INSULIN GLARGINE 100 [IU]/ML
11 INJECTION, SOLUTION SUBCUTANEOUS AT BEDTIME
Refills: 0 | Status: DISCONTINUED | OUTPATIENT
Start: 2019-11-13 | End: 2019-11-13

## 2019-11-13 RX ADMIN — TAMSULOSIN HYDROCHLORIDE 0.4 MILLIGRAM(S): 0.4 CAPSULE ORAL at 21:26

## 2019-11-13 RX ADMIN — INSULIN GLARGINE 8 UNIT(S): 100 INJECTION, SOLUTION SUBCUTANEOUS at 21:22

## 2019-11-13 RX ADMIN — Medication 12.5 MILLIGRAM(S): at 17:24

## 2019-11-13 RX ADMIN — Medication 12.5 MILLIGRAM(S): at 06:14

## 2019-11-13 RX ADMIN — Medication 4: at 17:24

## 2019-11-13 RX ADMIN — SIMVASTATIN 40 MILLIGRAM(S): 20 TABLET, FILM COATED ORAL at 21:26

## 2019-11-13 RX ADMIN — Medication 4 UNIT(S): at 17:24

## 2019-11-13 RX ADMIN — GABAPENTIN 300 MILLIGRAM(S): 400 CAPSULE ORAL at 17:24

## 2019-11-13 RX ADMIN — LIDOCAINE 1 PATCH: 4 CREAM TOPICAL at 21:02

## 2019-11-13 RX ADMIN — Medication 2: at 21:22

## 2019-11-13 RX ADMIN — Medication 4 UNIT(S): at 12:15

## 2019-11-13 RX ADMIN — LIDOCAINE 1 PATCH: 4 CREAM TOPICAL at 12:15

## 2019-11-13 RX ADMIN — Medication 5 MILLIGRAM(S): at 21:26

## 2019-11-13 RX ADMIN — SENNA PLUS 2 TABLET(S): 8.6 TABLET ORAL at 21:26

## 2019-11-13 RX ADMIN — Medication 100 MILLIGRAM(S): at 13:33

## 2019-11-13 RX ADMIN — Medication 81 MILLIGRAM(S): at 12:15

## 2019-11-13 RX ADMIN — GABAPENTIN 300 MILLIGRAM(S): 400 CAPSULE ORAL at 06:14

## 2019-11-13 RX ADMIN — Medication 4 UNIT(S): at 08:24

## 2019-11-13 RX ADMIN — Medication 100 MILLIGRAM(S): at 06:13

## 2019-11-13 RX ADMIN — CLOPIDOGREL BISULFATE 75 MILLIGRAM(S): 75 TABLET, FILM COATED ORAL at 12:15

## 2019-11-13 RX ADMIN — Medication 100 MILLIGRAM(S): at 21:22

## 2019-11-13 NOTE — PROGRESS NOTE ADULT - PROBLEM SELECTOR PLAN 2
- Monitor blood sugars AC/HS and adjust as needed  - goal -180.   - Carbohydrate consistent diabetic diet with evening snacks.  - Endocrine Consult reviewed  - Lantus 11 units evening, Humalog 4 units TID before meals - Monitor blood sugars AC/HS and adjust as needed  - goal -180.   - Carbohydrate consistent diabetic diet with evening snacks.  - Endocrine Consult reviewed  - Patient NPO Tonight for surgery tomorrow  - Lantus 5 Units tonight

## 2019-11-13 NOTE — PROGRESS NOTE ADULT - CONSTITUTIONAL DETAILS
well-groomed/no distress/well-nourished
well-nourished/no distress/well-developed/well-groomed
distress due to pain/well-developed/well-groomed

## 2019-11-13 NOTE — PROGRESS NOTE ADULT - SUBJECTIVE AND OBJECTIVE BOX
YEOM, CHANG  79y  Male      Patient is a 79y old  Male who presents with a chief complaint of 2nd right toe pain (13 Nov 2019 09:54)      INTERVAL HPI/OVERNIGHT EVENTS: Patient is s/p angioplasty of Right lower extremity yesterday evening.  Patient denies pain today.  Patient states he feels markedly improved today.        REVIEW OF SYSTEMS:  CONSTITUTIONAL: No fever, weight loss, or fatigue  EYES: No eye pain, visual disturbances, or discharge  ENMT:  No difficulty hearing, tinnitus, vertigo; No sinus or throat pain  NECK: No pain or stiffness  BREASTS: No pain, masses, or nipple discharge  RESPIRATORY: No cough, wheezing, chills or hemoptysis; No shortness of breath  CARDIOVASCULAR: No chest pain, palpitations, dizziness, or leg swelling  GASTROINTESTINAL: No abdominal or epigastric pain. No nausea, vomiting, or hematemesis; No diarrhea or constipation. No melena or hematochezia.  GENITOURINARY: No dysuria, frequency, hematuria, or incontinence  NEUROLOGICAL: No headaches, memory loss, loss of strength, numbness, or tremors  SKIN: No itching, burning, rashes, or lesions   LYMPH NODES: No enlarged glands  ENDOCRINE: No heat or cold intolerance; No hair loss  MUSCULOSKELETAL: No joint pain or swelling; No muscle, back, or extremity pain  PSYCHIATRIC: No depression, anxiety, mood swings, or difficulty sleeping  HEME/LYMPH: No easy bruising, or bleeding gums  ALLERY AND IMMUNOLOGIC: No hives or eczema  FAMILY HISTORY:    T(C): 36.7 (11-13-19 @ 07:44), Max: 36.8 (11-12-19 @ 14:17)  HR: 56 (11-13-19 @ 07:44) (56 - 76)  BP: 133/50 (11-13-19 @ 07:44) (117/64 - 172/64)  RR: 18 (11-13-19 @ 07:44) (11 - 22)  SpO2: 95% (11-13-19 @ 07:44) (95% - 100%)  Wt(kg): --Vital Signs Last 24 Hrs  T(C): 36.7 (13 Nov 2019 07:44), Max: 36.8 (12 Nov 2019 14:17)  T(F): 98 (13 Nov 2019 07:44), Max: 98.2 (12 Nov 2019 14:17)  HR: 56 (13 Nov 2019 07:44) (56 - 76)  BP: 133/50 (13 Nov 2019 07:44) (117/64 - 172/64)  BP(mean): 93 (12 Nov 2019 22:14) (79 - 96)  RR: 18 (13 Nov 2019 07:44) (11 - 22)  SpO2: 95% (13 Nov 2019 07:44) (95% - 100%)  No Known Allergies      REVIEW OF SYSTEMS:  CONSTITUTIONAL: No fever   EYES: no visual disturbances  NECK: No pain    RESPIRATORY: No cough; No shortness of breath  CARDIOVASCULAR: No chest pain   GASTROINTESTINAL: No pain. No nausea or vomiting  NEUROLOGICAL: No headache    MUSCULOSKELETAL: no pain in right foot   GENITOURINARY: no dysuria  PSYCHIATRY: no depression   ALL OTHER  ROS negative      Consultant(s) Notes Reviewed:  [x ] YES  [ ] NO  Care Discussed with Consultants/Other Providers [ x] YES  [ ] NO    LABS:                        10.8   6.90  )-----------( 349      ( 13 Nov 2019 07:43 )             31.9   11-12    137  |  104  |  10  ----------------------------<  95  3.9   |  29  |  0.62    PTT - ( 12 Nov 2019 06:01 )  PTT:76.7 secCa    8.3<L>      12 Nov 2019 06:01    WBC Count: 6.90 K/uL (11-13 @ 07:43)  WBC Count: 8.78 K/uL (11-12 @ 06:01)  WBC Count: 11.83 K/uL (11-11 @ 06:09)  WBC Count: 8.05 K/uL (11-10 @ 05:58)  WBC Count: 7.88 K/uL (11-09 @ 05:49)    RADIOLOGY & ADDITIONAL TESTS:  < from: VA Physiol Extremity Lower 3+ Level, BI (11.05.19 @ 14:58) >    EXAM:  US PHYSIOL LWR EXT 3+ LEV BI                         PROCEDURE DATE:  11/05/2019    INTERPRETATION:  Clinical indication: Right lower extremity claudication    Comparison: None    Findings: There are biphasic waveforms bilaterally, abnormally dampened   at the level of the metatarsals and left digit. There is no abnormal   segmental pressure gradient in the right calf compatible with focal   segmental disease.    Right LESLIE = 0.71  Left LESLIE = 1.19    IMPRESSION:     Right LESLIE compatible with moderate peripheral vascular disease. Segmental   disease within the right calf.    Normal left LESLIE.  DIANELYS ROLLE M.D., ATTENDING RADIOLOGIST  This document has been electronically signed. Nov 5 2019  3:05PM  < end of copied text >    < from: Xray Toes, Right Foot (11.04.19 @ 06:04) >    EXAM:  TOE(S) RIGHT FOOT                        PROCEDURE DATE:  11/04/2019    INTERPRETATION:  Right toes x-rays    Indication: Pain.    3 views of the right toes are acquired without a previous examination for   comparison.    Impression: No evidence for an acute fracture or dislocation.    The joint spaces are preserved.    The osseous mineralization is within normal limits.   APRIL WALKER M.D., ATTENDING RADIOLOGIST  This document has been electronically signed. Nov 4 2019  8:37AM  < end of copied text >    < from: Xray Chest 1 View- PORTABLE-Urgent (11.04.19 @ 14:25) >    EXAM:  XR CHEST PORTABLE URGENT 1V                          PROCEDURE DATE:  11/04/2019      INTERPRETATION:  History: Follow-up fluid overload    Portable radiograph of the chest is performed. There are no prior studies   for comparison.  Sternal wires are appreciated. The heart is not enlarged. There is no   focal infiltrate or pleural effusion. There is no evidence of pulmonary   vascular congestion. There is osteopenia and degenerative change of the   bony structures with sutures seenoverlying the left shoulder.    Impression: No active pulmonary disease  Sternotomy wires    ELSIE METZGER M.D.,ATTENDING RADIOLOGIST  This document has been electronically signed. Nov 4 2019  2:53PM  < end of copied text >      Imaging Personally Reviewed:  [x ] YES  [ ] NO  aspirin enteric coated 81 milliGRAM(s) Oral daily  bisacodyl 5 milliGRAM(s) Oral at bedtime  ceFAZolin   IVPB 1000 milliGRAM(s) IV Intermittent every 8 hours  clopidogrel Tablet 75 milliGRAM(s) Oral daily  gabapentin 300 milliGRAM(s) Oral every 12 hours  influenza   Vaccine 0.5 milliLiter(s) IntraMuscular once  insulin glargine Injectable (LANTUS) 11 Unit(s) SubCutaneous at bedtime  insulin lispro (HumaLOG) corrective regimen sliding scale   SubCutaneous at bedtime  insulin lispro (HumaLOG) corrective regimen sliding scale   SubCutaneous three times a day before meals  insulin lispro Injectable (HumaLOG) 4 Unit(s) SubCutaneous three times a day before meals  lactated ringers. 1000 milliLiter(s) IV Continuous <Continuous>  lidocaine   Patch 1 Patch Transdermal daily  metoprolol tartrate 12.5 milliGRAM(s) Oral two times a day  oxyCODONE    IR 10 milliGRAM(s) Oral every 6 hours PRN  senna 2 Tablet(s) Oral at bedtime  simvastatin 40 milliGRAM(s) Oral at bedtime  tamsulosin 0.4 milliGRAM(s) Oral at bedtime      HEALTH ISSUES - PROBLEM Dx:  Dehydration  Hyperglycemia  Dehydration  Dehydration  Goals of care, counseling/discussion: Goals of care, counseling/discussion  Prophylactic measure: Prophylactic measure  Cellulitis of toe of right foot: Cellulitis of toe of right foot  PVD (peripheral vascular disease): PVD (peripheral vascular disease)  Diabetes: Diabetes  HTN (hypertension): HTN (hypertension)  Ischemic ulcer of toe of right foot: Ischemic ulcer of toe of right foot  Acute pain of right foot: Acute pain of right foot

## 2019-11-13 NOTE — PROGRESS NOTE ADULT - SUBJECTIVE AND OBJECTIVE BOX
79M w RLE ischemia, dry gangrene of 2nd right toe, s/p RLE angio 11/12    Patient seen at bedside. Patient stable overnight. No acute events overnight.    Vital Signs Last 24 Hrs  T(C): 36.7 (13 Nov 2019 07:44), Max: 36.8 (12 Nov 2019 14:17)  T(F): 98 (13 Nov 2019 07:44), Max: 98.2 (12 Nov 2019 14:17)  HR: 56 (13 Nov 2019 07:44) (56 - 76)  BP: 133/50 (13 Nov 2019 07:44) (117/64 - 172/64)  BP(mean): 93 (12 Nov 2019 22:14) (79 - 96)  RR: 18 (13 Nov 2019 07:44) (11 - 22)  SpO2: 95% (13 Nov 2019 07:44) (95% - 100%)  NAD A+Ox3  RLE warm well perfused, dressing C/D/I                          10.8   6.90  )-----------( 349      ( 13 Nov 2019 07:43 )             31.9     11-12    137  |  104  |  10  ----------------------------<  95  3.9   |  29  |  0.62    Ca    8.3<L>      12 Nov 2019 06:01

## 2019-11-13 NOTE — PROGRESS NOTE ADULT - ASSESSMENT
79 year old Spanish speaking male from home lives alone ambulates with cane at baseline with PMH of DM, HTN and PSH open heart surgery presents with toe pain. Reports R 2nd toe pain and wound since tripping and falling on it at [employer] on october 29th while wearing slippers.

## 2019-11-13 NOTE — PROGRESS NOTE ADULT - GASTROINTESTINAL DETAILS
no guarding/no masses palpable/bowel sounds normal/no rigidity/no rebound tenderness/soft/nontender/no distention
nontender/no guarding/no masses palpable/bowel sounds normal/soft/no distention/no rebound tenderness
soft/nontender/bowel sounds normal/no guarding/no rebound tenderness/no distention

## 2019-11-13 NOTE — PROGRESS NOTE ADULT - PROBLEM SELECTOR PLAN 1
- p/w right 2nd Toe and Cellulitis with erythema, swelling, distally purple  - No Leukocytosis   - Afebrile   - c/w cefazolin as per ID  - LESLIE reviewed- 0.71 on the right foot, moderate   - s/p angioplasty RLE 11/12   - vascular following  - F/u- Podiatry planning Partial 2nd ray amputation vs TMA Friday  - Pain management consult reviewed- - p/w right 2nd Toe and Cellulitis with erythema, swelling, distally purple  - No Leukocytosis   - Afebrile   - c/w cefazolin as per ID  - LESLIE reviewed- 0.71 on the right foot, moderate   - s/p angioplasty RLE 11/12   - vascular following  - F/u- Podiatry planning Partial 2nd ray amputation vs TMA Thursday 4PM  - Pain management consult reviewed-

## 2019-11-13 NOTE — PROGRESS NOTE ADULT - SUBJECTIVE AND OBJECTIVE BOX
79y Male is under our care with cellulitis of right foot, gangrene of the tip of 2nd right toe, pt is s/p RLE  angioplasty  on 11/12/19. Pt is in bed, no distress, complains of pain in his left foot and leg,  Pt remains afebrile, no leukocytosis. Podiatry and vascular surgery discussing amputation options. Pt continues to be on Kefzol Iv.   Meds:  ceFAZolin   IVPB 1000 milliGRAM(s) IV Intermittent every 8 hours    Allergies    No Known Allergies    Intolerances        VITALS:  Vital Signs Last 24 Hrs  T(C): 36.7 (13 Nov 2019 07:44), Max: 36.8 (12 Nov 2019 14:17)  T(F): 98 (13 Nov 2019 07:44), Max: 98.2 (12 Nov 2019 14:17)  HR: 56 (13 Nov 2019 07:44) (56 - 76)  BP: 133/50 (13 Nov 2019 07:44) (117/64 - 172/64)  BP(mean): 93 (12 Nov 2019 22:14) (79 - 96)  RR: 18 (13 Nov 2019 07:44) (11 - 22)  SpO2: 95% (13 Nov 2019 07:44) (95% - 100%)    LABS/DIAGNOSTIC TESTS:                          10.8   6.90  )-----------( 349      ( 13 Nov 2019 07:43 )             31.9         11-12    137  |  104  |  10  ----------------------------<  95  3.9   |  29  |  0.62    Ca    8.3<L>      12 Nov 2019 06:01            CULTURES: .Blood  11-04 @ 11:29   No growth at 5 days.  --  --

## 2019-11-13 NOTE — PROGRESS NOTE ADULT - PROBLEM SELECTOR PLAN 3
- vascular consult reviewed  - LESLIE reviewed- 0.71 on the right foot, moderate   - s/p angioplasty RLE 11/12  - f/u podiatry OR Friday, likely partial 2nd ray amputation vs TMA - vascular consult reviewed  - LESLIE reviewed- 0.71 on the right foot, moderate   - s/p angioplasty RLE 11/12  - f/u podiatry OR Thursday, Partial 2nd ray amputation

## 2019-11-13 NOTE — PROGRESS NOTE ADULT - SUBJECTIVE AND OBJECTIVE BOX
79 year old Wol[place] speaking male from home with PMH of type 2 DM, CAD s/p CABG, and HTN presented with complaint of right toe pain. Endocrinology was consulted for management of uncontrolled type 2 DM with hyperglycemia, A1c 10.9%.     Patient seen and examined at bedside. He reports feeling well overall. S/p RLE angio yesterday with mild lower extremity pain. Plan for 2nd toe amputation vs TMA by podiatry on Friday. FS reviewed. BG overall at goal on current regimen.      MEDICATIONS  (STANDING):  aspirin enteric coated 81 milliGRAM(s) Oral daily  bisacodyl 5 milliGRAM(s) Oral at bedtime  ceFAZolin   IVPB 1000 milliGRAM(s) IV Intermittent every 8 hours  clopidogrel Tablet 75 milliGRAM(s) Oral daily  gabapentin 300 milliGRAM(s) Oral every 12 hours  influenza   Vaccine 0.5 milliLiter(s) IntraMuscular once  insulin glargine Injectable (LANTUS) 11 Unit(s) SubCutaneous at bedtime  insulin lispro (HumaLOG) corrective regimen sliding scale   SubCutaneous at bedtime  insulin lispro (HumaLOG) corrective regimen sliding scale   SubCutaneous three times a day before meals  insulin lispro Injectable (HumaLOG) 4 Unit(s) SubCutaneous three times a day before meals  lactated ringers. 1000 milliLiter(s) (100 mL/Hr) IV Continuous <Continuous>  lidocaine   Patch 1 Patch Transdermal daily  metoprolol tartrate 12.5 milliGRAM(s) Oral two times a day  senna 2 Tablet(s) Oral at bedtime  simvastatin 40 milliGRAM(s) Oral at bedtime  tamsulosin 0.4 milliGRAM(s) Oral at bedtime    MEDICATIONS  (PRN):  oxyCODONE    IR 10 milliGRAM(s) Oral every 6 hours PRN Severe Pain (7 - 10)      REVIEW OF SYSTEMS:  CONSTITUTIONAL: No fever, weight loss, or fatigue  EYES: No eye pain, visual disturbances, or discharge  ENMT:  No difficulty hearing, tinnitus, vertigo; No sinus or throat pain  NECK: No pain or stiffness  RESPIRATORY: No cough, wheezing, chills or hemoptysis; No shortness of breath  CARDIOVASCULAR: No chest pain, palpitations, dizziness, or leg swelling  GASTROINTESTINAL: No abdominal or epigastric pain. No nausea, vomiting, or hematemesis; No diarrhea or constipation. No melena or hematochezia.  GENITOURINARY: No dysuria, frequency, hematuria, or incontinence  NEUROLOGICAL: No headaches, memory loss, loss of strength, numbness, or tremors  SKIN: right second toe wound with pain and darkening of the skin, No itching, burning, or rashes  LYMPH NODES: No enlarged glands  ENDOCRINE: No heat or cold intolerance; No hair loss  MUSCULOSKELETAL: No joint pain or swelling; No muscle, back, or extremity pain  PSYCHIATRIC: No depression, anxiety, mood swings, or difficulty sleeping  HEME/LYMPH: No easy bruising, or bleeding gums  ALLERGY AND IMMUNOLOGIC: No hives or eczema      PHYSICAL EXAM:      VITAL SIGNS  T(C): 36.7 (11-13-19 @ 07:44), Max: 36.7 (11-12-19 @ 15:46)  HR: 56 (11-13-19 @ 07:44) (56 - 76)  BP: 133/50 (11-13-19 @ 07:44) (117/64 - 172/64)  RR: 18 (11-13-19 @ 07:44) (11 - 22)  SpO2: 95% (11-13-19 @ 07:44) (95% - 100%)    General: Alert and oriented. No acute distress.   Eye: Extraocular movements are intact.    HENT:  Normocephalic.    Respiratory: Respirations are non-labored, Symmetrical chest wall expansion.    Gastrointestinal:  Non-distended.    Neurologic:  Alert and oriented X 3, No focal defects, Cranial Nerves II-XII are grossly intact.    Psychiatric:  Cooperative, Appropriate mood & affect.  SKIN: right second toe wound with skin discoloration, dressing in place, No rashes or lesions    LABS:                        10.8   6.90  )-----------( 349      ( 13 Nov 2019 07:43 )             31.9     11-12    137  |  104  |  10  ----------------------------<  95  3.9   |  29  |  0.62    Ca    8.3<L>      12 Nov 2019 06:01      PTT - ( 12 Nov 2019 06:01 )  PTT:76.7 sec    CAPILLARY BLOOD GLUCOSE      POCT Blood Glucose.: 121 mg/dL (13 Nov 2019 11:35)  POCT Blood Glucose.: 148 mg/dL (13 Nov 2019 08:06)  POCT Blood Glucose.: 118 mg/dL (12 Nov 2019 22:48)  POCT Blood Glucose.: 123 mg/dL (12 Nov 2019 22:39)  POCT Blood Glucose.: 135 mg/dL (12 Nov 2019 19:21)

## 2019-11-13 NOTE — PROGRESS NOTE ADULT - SUBJECTIVE AND OBJECTIVE BOX
POSTOP NOTE    Patient is doing okay.      Vital Signs Last 24 Hrs  T(C): 36.3 (13 Nov 2019 00:18), Max: 36.8 (12 Nov 2019 08:49)  T(F): 97.4 (13 Nov 2019 00:18), Max: 98.3 (12 Nov 2019 08:49)  HR: 72 (13 Nov 2019 05:26) (56 - 76)  BP: 134/47 (13 Nov 2019 05:26) (117/64 - 172/64)  BP(mean): 93 (12 Nov 2019 22:14) (79 - 96)  RR: 18 (13 Nov 2019 00:18) (11 - 22)  SpO2: 96% (13 Nov 2019 00:18) (95% - 100%)    Daily     Daily     I&O's Detail    12 Nov 2019 07:01  -  13 Nov 2019 06:45  --------------------------------------------------------  IN:    Lactated Ringers IV Bolus: 1200 mL  Total IN: 1200 mL    OUT:    Estimated Blood Loss: 20 mL    Voided: 700 mL  Total OUT: 720 mL    Total NET: 480 mL          MEDICATIONS  (STANDING):  aspirin enteric coated 81 milliGRAM(s) Oral daily  bisacodyl 5 milliGRAM(s) Oral at bedtime  ceFAZolin   IVPB 1000 milliGRAM(s) IV Intermittent every 8 hours  clopidogrel Tablet 75 milliGRAM(s) Oral daily  gabapentin 300 milliGRAM(s) Oral every 12 hours  influenza   Vaccine 0.5 milliLiter(s) IntraMuscular once  insulin glargine Injectable (LANTUS) 8 Unit(s) SubCutaneous at bedtime  insulin lispro (HumaLOG) corrective regimen sliding scale   SubCutaneous at bedtime  insulin lispro (HumaLOG) corrective regimen sliding scale   SubCutaneous three times a day before meals  insulin lispro Injectable (HumaLOG) 4 Unit(s) SubCutaneous three times a day before meals  lactated ringers. 1000 milliLiter(s) (100 mL/Hr) IV Continuous <Continuous>  lidocaine   Patch 1 Patch Transdermal daily  metoprolol tartrate 12.5 milliGRAM(s) Oral two times a day  senna 2 Tablet(s) Oral at bedtime  simvastatin 40 milliGRAM(s) Oral at bedtime  tamsulosin 0.4 milliGRAM(s) Oral at bedtime    MEDICATIONS  (PRN):  oxyCODONE    IR 10 milliGRAM(s) Oral every 6 hours PRN Severe Pain (7 - 10)      PHYSICAL EXAM:-  CONSTITIONAL/GENERAL: In no acute distress  RESPIRATORY/CHEST: Clear to percussion bilaterally; Normal respiratory effort  CARDIOVASCULAR/HEART: Regular rate and rhythm  GASTROINTESTINAL/ABDOMEN: Soft, Nontender, Nondistended; Bowel sounds present  MUSCULOSKELETAL/EXTREMITIES:  No clubbing, cyanosis, or edema  Dressing clean, no bleeding. Feet warm    LABS:-                        10.9   8.78  )-----------( 316      ( 12 Nov 2019 06:01 )             31.8     Neutrophil %:   11-12    137  |  104  |  10  ----------------------------<  95  3.9   |  29  |  0.62    Ca    8.3<L>      12 Nov 2019 06:01      PTT - ( 12 Nov 2019 06:01 )  PTT:76.7 sec            S/P RLE Angioplasty  Doing well  Postop care

## 2019-11-13 NOTE — PROGRESS NOTE ADULT - [facility] PE MLT D E PC
regular rate and rhythm/no murmur
no murmur/regular rate and rhythm
no murmur/regular rate and rhythm

## 2019-11-13 NOTE — PROGRESS NOTE ADULT - ATTENDING COMMENTS
Patient seen at bedside, majority of 2nd digit now with ischemic/necrotic changes, worse plantarly.  Will discuss with vasc team about healing potential of different amputations.  Partial 2nd ray may not have enough viable skin to close entirely.  Would remain at risk for more proximal amputation (TMA or BKA).

## 2019-11-13 NOTE — PROGRESS NOTE ADULT - ASSESSMENT
79 year old [place] speaking male from home with PMH of type 2 DM, CAD s/p CABG, and HTN presented with complaint of right toe pain. Endocrinology was consulted for management of uncontrolled type 2 DM with hyperglycemia, A1c 10.9%.     1. Uncontrolled type 2 DM with hyperglycemia   -FS at goal <180  -continue Lantus 11 units at bedtime   -continue Humalog 4 units TID with meals   -recommend to continue moderate dose mealtime rapid acting insulin as below  BG 70-100mg/dL 0 units  -150 mg/dL 0 units  -200 mg/dL 2 units  -250 mg/dL 4 units  -300 mg/dL 6 units  -350 mg/dL 8 units  -400 mg/dL 10 units  BG > 400mg/dL 12 units  -FS AC HS  -ensure consistent carbohydrate diet   -will monitor FS and adjust accordingly   --if patient is NPO for any reason please change FS and sliding scale to NPO lispro scale     2. Right second toe wound  -s/p RLE angio yesterday  -wound care as per podiatry  -plan for right 2nd toe amputation vs TMA on Friday  -continue antibiotics as per ID  -optimize glycemic control    3. HTN  -BP at goal  -continue losartan and metoprolol    Plan discussed with primary team  Please  call  w/ any questions or concerns [phone]

## 2019-11-13 NOTE — PROGRESS NOTE ADULT - SUBJECTIVE AND OBJECTIVE BOX
Chief Complaint : Patient is a 79y old  Male who presents with a chief complaint of 2nd right toe pain (04 Nov 2019 08:38)    Pt was seen initially bedside in ED holding complaining of pain in his right second toe. Pt states he fell four days ago and came for pain. Pt states he is a diabetic but does not like to take his medications. Pt states he does not see a foot doctor. Pt states he has pain and numbness in his feet and had the pain and numbness before the fall as well.     Pt seen bedside this morning resting. Pt states vascular did an angioplasty yesterday. Pt admits to continued pain. Pt states vascular suggested a toe amputation and not a BKA.  Student  used for direct communication in [place].     Patient admits to  (-) Fevers, (-) Chills, (-) Nausea, (-) Vomiting, (-) Shortness of Breath      PMH:   PSH:    Allergies:No Known Allergies      Labs:                                                  10.8   6.90  )-----------( 349      ( 13 Nov 2019 07:43 )             31.9       11-12    137  |  104  |  10  ----------------------------<  95  3.9   |  29  |  0.62    Ca    8.3<L>      12 Nov 2019 06:01        WBC Trend  WBC Count: 6.90 K/uL (11.13.19 @ 07:43)  WBC Count: 11.83 K/uL (11.11.19 @ 06:09)  9.63 Date (11-04 @ 06:14)      Vital Signs Last 24 Hrs  T(C): 36.7 (13 Nov 2019 07:44), Max: 36.8 (12 Nov 2019 14:17)  T(F): 98 (13 Nov 2019 07:44), Max: 98.2 (12 Nov 2019 14:17)  HR: 56 (13 Nov 2019 07:44) (56 - 76)  BP: 133/50 (13 Nov 2019 07:44) (117/64 - 172/64)  BP(mean): 93 (12 Nov 2019 22:14) (79 - 96)  RR: 18 (13 Nov 2019 07:44) (11 - 22)  SpO2: 95% (13 Nov 2019 07:44) (95% - 100%)      O:   General: Pleasant  male NAD & AOX3.    Integument:  Skin warm, dry and supple bilateral.    Derm: right second digit has dry gangrene  -fluctuance, erythema dorsal foot and plantar second digit, 0.5x0.4x0.1cm wound plantar second digit with surrounding erythema, no drainage, -PTB, -malodor + interdigital maceration   Vascular: Dorsalis Pedis and Posterior Tibial pulses 1/4.  Capillary re-fill time less then 3 seconds digits 1-5 bilateral. TG warm to cool b/l, cold to the second digit foot  Neuro: Protective sensation diminished to the level of the digits bilateral.  MSK: Muscle strength 5/5 all major muscle groups bilateral.      A: distal hematoma right second digit   plantar ulcer right second digit       P:   Chart reviewed and Patient evaluated  Discussed diagnosis and treatment with patient  X-rays reviewed   Angiogram from 11/12 reviewed   Applied betadine DSD to wound and interdigital maceration    Pt to heel WB right foot, full WB left foot   Discussed importance of daily foot examinations and proper shoe gear and to importance of lower Fasting Blood Glucose levels.   Wound care orders in: apply betadine DSD to right toe and in between the first and second interspace daily. keep clean dry and intact.   Pt will need amputation. Potential for digit amputation to fail due to lack of blood flow. Will discuss with vascular and book for amputation Friday partial second ray vs TMA.   Discussed with pt who demonstrated verbal understanding   Seen with  Chief Complaint : Patient is a 79y old  Male who presents with a chief complaint of 2nd right toe pain (04 Nov 2019 08:38)    Pt was seen initially bedside in ED holding complaining of pain in his right second toe. Pt states he fell four days ago and came for pain. Pt states he is a diabetic but does not like to take his medications. Pt states he does not see a foot doctor. Pt states he has pain and numbness in his feet and had the pain and numbness before the fall as well.     Pt seen bedside this morning resting. Pt states vascular did an angioplasty yesterday. Pt admits to continued pain. Pt states vascular suggested a toe amputation and not a BKA.  Student  used for direct communication in [place]ian.     Patient admits to  (-) Fevers, (-) Chills, (-) Nausea, (-) Vomiting, (-) Shortness of Breath      PMH:   PSH:    Allergies:No Known Allergies      Labs:                                                  10.8   6.90  )-----------( 349      ( 13 Nov 2019 07:43 )             31.9       11-12    137  |  104  |  10  ----------------------------<  95  3.9   |  29  |  0.62    Ca    8.3<L>      12 Nov 2019 06:01        WBC Trend  WBC Count: 6.90 K/uL (11.13.19 @ 07:43)  WBC Count: 11.83 K/uL (11.11.19 @ 06:09)  9.63 Date (11-04 @ 06:14)      Vital Signs Last 24 Hrs  T(C): 36.7 (13 Nov 2019 07:44), Max: 36.8 (12 Nov 2019 14:17)  T(F): 98 (13 Nov 2019 07:44), Max: 98.2 (12 Nov 2019 14:17)  HR: 56 (13 Nov 2019 07:44) (56 - 76)  BP: 133/50 (13 Nov 2019 07:44) (117/64 - 172/64)  BP(mean): 93 (12 Nov 2019 22:14) (79 - 96)  RR: 18 (13 Nov 2019 07:44) (11 - 22)  SpO2: 95% (13 Nov 2019 07:44) (95% - 100%)      O:   General: Pleasant  male NAD & AOX3.    Integument:  Skin warm, dry and supple bilateral.    Derm: right second digit has dry gangrene  -fluctuance, erythema dorsal foot and plantar second digit, 0.5x0.4x0.1cm wound plantar second digit with surrounding erythema, no drainage, -PTB, -malodor + interdigital maceration   Vascular: Dorsalis Pedis and Posterior Tibial pulses 1/4.  Capillary re-fill time less then 3 seconds digits 1-5 bilateral. TG warm to cool b/l, cold to the second digit foot  Neuro: Protective sensation diminished to the level of the digits bilateral.  MSK: Muscle strength 5/5 all major muscle groups bilateral.      A: gangrene right second digit   plantar ulcer right second digit       P:   Chart reviewed and Patient evaluated  Discussed diagnosis and treatment with patient  X-rays reviewed   Angiogram from 11/12 reviewed   Applied betadine DSD to wound and interdigital maceration    Pt to heel WB right foot, full WB left foot   Discussed importance of daily foot examinations and proper shoe gear and to importance of lower Fasting Blood Glucose levels.   Wound care orders in: apply betadine DSD to right toe and in between the first and second interspace daily. keep clean dry and intact.   Pt will need amputation. Potential for digit amputation to fail due to lack of blood flow. Will discuss with vascular and book for amputation Friday partial second ray vs TMA.   Discussed with pt who demonstrated verbal understanding   Seen with

## 2019-11-13 NOTE — PROGRESS NOTE ADULT - ATTENDING COMMENTS
As above s/p R AT PTA. Puncture site ok.  Resid pain/dry gang  Cont ASA/plavix  Check plavix assay (p2y12)  Art perf optimized- FU podiatry plans. Rec debridement/amp of devitalized toes/foot due to ongoing pain and unknown durability of PTA.  If foot can't be salvaged- will need BKA.  Will need vasc surveillance.

## 2019-11-13 NOTE — PROGRESS NOTE ADULT - ASSESSMENT
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

## 2019-11-13 NOTE — PROGRESS NOTE ADULT - ATTENDING COMMENTS
I have seen and examined the patient. case discussed with house staff in detail. I have reviewed and edited the note where appropriate.    80 yo M with DM, htn, hx CAD presented with pain to right second toe x2 weeks. Pt placed on heparin drip and evaluated by vascular team. Angiogram with angioplasty was attempted on 11/8/19 but was unsuccessful.    Podiatry and vascular recommendations appreciated.  reports painon r foot    s/p Angioplasty yesterday  Planned OR on Thursday by podiatry for TMA vs BKA    A/P  Right 2nd toe dry gangrene  PVD  HTN  Type2 DM    Plan  c/w IV cefazolin  OR tomorrow   c/w IV fluids   Lantus 8 u tonight,    pain control    Discussed with patient using Kiswahili .      35 minutes spent on total encounter; more than 50% of the visit was spent counseling and/or coordinating care by the attending physician.     Plan discussed with Dr. Duran.

## 2019-11-13 NOTE — PROGRESS NOTE ADULT - SKIN COMMENTS
right foot cellulitis has resolved , but still has reddish discoloration of his right 2nd toe and dry gangrene of the tip of his right 2nd toe
R 2nd toe stable hematoma, + erythema and warmth extends to dorsal foot (improving), tender to touch, no drainage
R 2nd toe with hematoma, + erythema and warmth extends to dorsal foot, tender to touch, m, no drainage, unpalpable DP

## 2019-11-14 ENCOUNTER — RESULT REVIEW (OUTPATIENT)
Age: 79
End: 2019-11-14

## 2019-11-14 LAB
ANION GAP SERPL CALC-SCNC: 7 MMOL/L — SIGNIFICANT CHANGE UP (ref 5–17)
BUN SERPL-MCNC: 7 MG/DL — SIGNIFICANT CHANGE UP (ref 7–18)
CALCIUM SERPL-MCNC: 8.2 MG/DL — LOW (ref 8.4–10.5)
CHLORIDE SERPL-SCNC: 110 MMOL/L — HIGH (ref 96–108)
CO2 SERPL-SCNC: 26 MMOL/L — SIGNIFICANT CHANGE UP (ref 22–31)
CREAT SERPL-MCNC: 0.59 MG/DL — SIGNIFICANT CHANGE UP (ref 0.5–1.3)
GLUCOSE BLDC GLUCOMTR-MCNC: 114 MG/DL — HIGH (ref 70–99)
GLUCOSE BLDC GLUCOMTR-MCNC: 132 MG/DL — HIGH (ref 70–99)
GLUCOSE BLDC GLUCOMTR-MCNC: 284 MG/DL — HIGH (ref 70–99)
GLUCOSE BLDC GLUCOMTR-MCNC: 97 MG/DL — SIGNIFICANT CHANGE UP (ref 70–99)
GLUCOSE SERPL-MCNC: 117 MG/DL — HIGH (ref 70–99)
HCT VFR BLD CALC: 33.8 % — LOW (ref 39–50)
HGB BLD-MCNC: 11.6 G/DL — LOW (ref 13–17)
MCHC RBC-ENTMCNC: 33.5 PG — SIGNIFICANT CHANGE UP (ref 27–34)
MCHC RBC-ENTMCNC: 34.3 GM/DL — SIGNIFICANT CHANGE UP (ref 32–36)
MCV RBC AUTO: 97.7 FL — SIGNIFICANT CHANGE UP (ref 80–100)
NRBC # BLD: 0 /100 WBCS — SIGNIFICANT CHANGE UP (ref 0–0)
PLATELET # BLD AUTO: 344 K/UL — SIGNIFICANT CHANGE UP (ref 150–400)
POTASSIUM SERPL-MCNC: 3.9 MMOL/L — SIGNIFICANT CHANGE UP (ref 3.5–5.3)
POTASSIUM SERPL-SCNC: 3.9 MMOL/L — SIGNIFICANT CHANGE UP (ref 3.5–5.3)
RBC # BLD: 3.46 M/UL — LOW (ref 4.2–5.8)
RBC # FLD: 12.1 % — SIGNIFICANT CHANGE UP (ref 10.3–14.5)
SODIUM SERPL-SCNC: 143 MMOL/L — SIGNIFICANT CHANGE UP (ref 135–145)
WBC # BLD: 6.64 K/UL — SIGNIFICANT CHANGE UP (ref 3.8–10.5)
WBC # FLD AUTO: 6.64 K/UL — SIGNIFICANT CHANGE UP (ref 3.8–10.5)

## 2019-11-14 PROCEDURE — 73630 X-RAY EXAM OF FOOT: CPT | Mod: 26,RT

## 2019-11-14 PROCEDURE — 99233 SBSQ HOSP IP/OBS HIGH 50: CPT | Mod: GC

## 2019-11-14 PROCEDURE — 88311 DECALCIFY TISSUE: CPT | Mod: 26

## 2019-11-14 PROCEDURE — 88305 TISSUE EXAM BY PATHOLOGIST: CPT | Mod: 26

## 2019-11-14 RX ORDER — POLYETHYLENE GLYCOL 3350 17 G/17G
17 POWDER, FOR SOLUTION ORAL DAILY
Refills: 0 | Status: DISCONTINUED | OUTPATIENT
Start: 2019-11-14 | End: 2019-11-18

## 2019-11-14 RX ORDER — ASPIRIN/CALCIUM CARB/MAGNESIUM 324 MG
81 TABLET ORAL DAILY
Refills: 0 | Status: DISCONTINUED | OUTPATIENT
Start: 2019-11-14 | End: 2019-11-18

## 2019-11-14 RX ORDER — INSULIN LISPRO 100/ML
VIAL (ML) SUBCUTANEOUS AT BEDTIME
Refills: 0 | Status: DISCONTINUED | OUTPATIENT
Start: 2019-11-14 | End: 2019-11-18

## 2019-11-14 RX ORDER — LANOLIN ALCOHOL/MO/W.PET/CERES
5 CREAM (GRAM) TOPICAL ONCE
Refills: 0 | Status: COMPLETED | OUTPATIENT
Start: 2019-11-14 | End: 2019-11-14

## 2019-11-14 RX ORDER — INSULIN LISPRO 100/ML
VIAL (ML) SUBCUTANEOUS EVERY 6 HOURS
Refills: 0 | Status: DISCONTINUED | OUTPATIENT
Start: 2019-11-14 | End: 2019-11-14

## 2019-11-14 RX ORDER — INSULIN LISPRO 100/ML
VIAL (ML) SUBCUTANEOUS
Refills: 0 | Status: DISCONTINUED | OUTPATIENT
Start: 2019-11-14 | End: 2019-11-18

## 2019-11-14 RX ORDER — METOPROLOL TARTRATE 50 MG
12.5 TABLET ORAL
Refills: 0 | Status: DISCONTINUED | OUTPATIENT
Start: 2019-11-14 | End: 2019-11-18

## 2019-11-14 RX ORDER — OXYCODONE AND ACETAMINOPHEN 5; 325 MG/1; MG/1
1 TABLET ORAL EVERY 6 HOURS
Refills: 0 | Status: DISCONTINUED | OUTPATIENT
Start: 2019-11-14 | End: 2019-11-15

## 2019-11-14 RX ORDER — INSULIN GLARGINE 100 [IU]/ML
11 INJECTION, SOLUTION SUBCUTANEOUS AT BEDTIME
Refills: 0 | Status: DISCONTINUED | OUTPATIENT
Start: 2019-11-14 | End: 2019-11-18

## 2019-11-14 RX ORDER — ONDANSETRON 8 MG/1
4 TABLET, FILM COATED ORAL ONCE
Refills: 0 | Status: DISCONTINUED | OUTPATIENT
Start: 2019-11-14 | End: 2019-11-14

## 2019-11-14 RX ORDER — OXYCODONE AND ACETAMINOPHEN 5; 325 MG/1; MG/1
2 TABLET ORAL EVERY 6 HOURS
Refills: 0 | Status: DISCONTINUED | OUTPATIENT
Start: 2019-11-14 | End: 2019-11-18

## 2019-11-14 RX ORDER — SODIUM CHLORIDE 9 MG/ML
1000 INJECTION, SOLUTION INTRAVENOUS
Refills: 0 | Status: DISCONTINUED | OUTPATIENT
Start: 2019-11-14 | End: 2019-11-14

## 2019-11-14 RX ORDER — GABAPENTIN 400 MG/1
300 CAPSULE ORAL EVERY 12 HOURS
Refills: 0 | Status: DISCONTINUED | OUTPATIENT
Start: 2019-11-14 | End: 2019-11-18

## 2019-11-14 RX ORDER — SIMVASTATIN 20 MG/1
40 TABLET, FILM COATED ORAL AT BEDTIME
Refills: 0 | Status: DISCONTINUED | OUTPATIENT
Start: 2019-11-14 | End: 2019-11-18

## 2019-11-14 RX ORDER — NITROGLYCERIN 6.5 MG
1 CAPSULE, EXTENDED RELEASE ORAL DAILY
Refills: 0 | Status: DISCONTINUED | OUTPATIENT
Start: 2019-11-14 | End: 2019-11-18

## 2019-11-14 RX ORDER — FENTANYL CITRATE 50 UG/ML
25 INJECTION INTRAVENOUS
Refills: 0 | Status: DISCONTINUED | OUTPATIENT
Start: 2019-11-14 | End: 2019-11-14

## 2019-11-14 RX ORDER — CLOPIDOGREL BISULFATE 75 MG/1
75 TABLET, FILM COATED ORAL DAILY
Refills: 0 | Status: DISCONTINUED | OUTPATIENT
Start: 2019-11-14 | End: 2019-11-18

## 2019-11-14 RX ORDER — TAMSULOSIN HYDROCHLORIDE 0.4 MG/1
0.4 CAPSULE ORAL AT BEDTIME
Refills: 0 | Status: DISCONTINUED | OUTPATIENT
Start: 2019-11-14 | End: 2019-11-18

## 2019-11-14 RX ORDER — INSULIN LISPRO 100/ML
4 VIAL (ML) SUBCUTANEOUS
Refills: 0 | Status: DISCONTINUED | OUTPATIENT
Start: 2019-11-14 | End: 2019-11-18

## 2019-11-14 RX ORDER — CEFAZOLIN SODIUM 1 G
1000 VIAL (EA) INJECTION EVERY 8 HOURS
Refills: 0 | Status: DISCONTINUED | OUTPATIENT
Start: 2019-11-14 | End: 2019-11-18

## 2019-11-14 RX ADMIN — Medication 3: at 22:33

## 2019-11-14 RX ADMIN — Medication 5 MILLIGRAM(S): at 23:03

## 2019-11-14 RX ADMIN — INSULIN GLARGINE 11 UNIT(S): 100 INJECTION, SOLUTION SUBCUTANEOUS at 23:03

## 2019-11-14 RX ADMIN — GABAPENTIN 300 MILLIGRAM(S): 400 CAPSULE ORAL at 06:29

## 2019-11-14 RX ADMIN — TAMSULOSIN HYDROCHLORIDE 0.4 MILLIGRAM(S): 0.4 CAPSULE ORAL at 22:33

## 2019-11-14 RX ADMIN — SIMVASTATIN 40 MILLIGRAM(S): 20 TABLET, FILM COATED ORAL at 22:33

## 2019-11-14 RX ADMIN — Medication 100 MILLIGRAM(S): at 06:30

## 2019-11-14 RX ADMIN — GABAPENTIN 300 MILLIGRAM(S): 400 CAPSULE ORAL at 17:36

## 2019-11-14 RX ADMIN — Medication 100 MILLIGRAM(S): at 22:37

## 2019-11-14 RX ADMIN — Medication 12.5 MILLIGRAM(S): at 06:29

## 2019-11-14 RX ADMIN — SODIUM CHLORIDE 40 MILLILITER(S): 9 INJECTION, SOLUTION INTRAVENOUS at 13:37

## 2019-11-14 RX ADMIN — Medication 100 MILLIGRAM(S): at 13:38

## 2019-11-14 RX ADMIN — OXYCODONE AND ACETAMINOPHEN 2 TABLET(S): 5; 325 TABLET ORAL at 22:33

## 2019-11-14 RX ADMIN — OXYCODONE AND ACETAMINOPHEN 2 TABLET(S): 5; 325 TABLET ORAL at 23:00

## 2019-11-14 RX ADMIN — LIDOCAINE 1 PATCH: 4 CREAM TOPICAL at 00:00

## 2019-11-14 RX ADMIN — Medication 12.5 MILLIGRAM(S): at 17:36

## 2019-11-14 NOTE — PROGRESS NOTE ADULT - PROBLEM SELECTOR PLAN 2
- Monitor blood sugars AC/HS and adjust as needed  - goal -180.   - Carbohydrate consistent diabetic diet with evening snacks.  - Endocrine Consult reviewed  - Resume 11 units Lantus after surgery today, 4 units Humalog before meals 3x a day, Moderate sliding scale with meals.  - q6h fingersticks while NPO

## 2019-11-14 NOTE — PROGRESS NOTE ADULT - SUBJECTIVE AND OBJECTIVE BOX
79M s/p angio for RLE ischemia    Seen in morning. Reporting continued pain at site of gangrenous toe.     Vital Signs Last 24 Hrs  T(C): 36.8 (14 Nov 2019 16:51), Max: 37.1 (14 Nov 2019 16:23)  T(F): 98.3 (14 Nov 2019 16:51), Max: 98.8 (14 Nov 2019 16:23)  HR: 59 (14 Nov 2019 16:51) (53 - 90)  BP: 132/57 (14 Nov 2019 16:51) (110/61 - 137/53)  BP(mean): 80 (14 Nov 2019 15:53) (77 - 82)  RR: 18 (14 Nov 2019 16:51) (17 - 19)  SpO2: 92% (14 Nov 2019 16:51) (92% - 96%)  NAD AOX3    RLE warm and well perfused, DP palpable b/l, 2nd toe (pre today's procedure) necrotic dry

## 2019-11-14 NOTE — PROGRESS NOTE ADULT - SUBJECTIVE AND OBJECTIVE BOX
Chief Complaint : Patient is a 79y old  Male who presents with a chief complaint of 2nd right toe pain (04 Nov 2019 08:38)    Pt was seen initially bedside in ED holding complaining of pain in his right second toe. Pt states he fell four days ago and came for pain. Pt states he is a diabetic but does not like to take his medications. Pt states he does not see a foot doctor. Pt states he has pain and numbness in his feet and had the pain and numbness before the fall as well.     Pt seen bedside this morning by podiatry team resting. Patient is aware he is going for partial 2nd ray amputation to the right foot this PM, and he endorses not having anything to eat or drink. Patient relates having continued pain in this right second toe and admits to having diarrhea. He denies f/c/n/v or SOB. Written consent obtained. Native Serbian speaking medical student  used for direct communication in Serbian. Toe inspected, no physical exam performed today.     Patient admits to  (-) Fevers, (-) Chills, (-) Nausea, (-) Vomiting, (-) Shortness of Breath      PMH:   PSH:    Allergies:No Known Allergies      Labs:     New labs pending                                                10.8   6.90  )-----------( 349      ( 13 Nov 2019 07:43 )             31.9       11-12    137  |  104  |  10  ----------------------------<  95  3.9   |  29  |  0.62    Ca    8.3<L>      12 Nov 2019 06:01        WBC Trend  WBC Count: 6.90 K/uL (11.13.19 @ 07:43)  WBC Count: 11.83 K/uL (11.11.19 @ 06:09)  9.63 Date (11-04 @ 06:14)      Vital Signs Last 24 Hrs  T(C): 36.4 (14 Nov 2019 07:54), Max: 36.7 (13 Nov 2019 15:47)  T(F): 97.6 (14 Nov 2019 07:54), Max: 98.1 (13 Nov 2019 15:47)  HR: 53 (14 Nov 2019 07:54) (53 - 90)  BP: 137/53 (14 Nov 2019 07:54) (117/60 - 137/53)  BP(mean): --  RR: 18 (14 Nov 2019 07:54) (18 - 18)  SpO2: 94% (14 Nov 2019 07:54) (94% - 96%)      O:   General: Pleasant  male NAD & AOX3.  No physical exam performed today, per previous exam:  Integument:  Skin warm, dry and supple bilateral.    Derm: right second digit has dry gangrene  -fluctuance, erythema dorsal foot and plantar second digit, 0.5x0.4x0.1cm wound plantar second digit with surrounding erythema, no drainage, -PTB, -malodor + interdigital maceration   Vascular: Dorsalis Pedis and Posterior Tibial pulses 1/4.  Capillary re-fill time less then 3 seconds digits 1-5 bilateral. TG warm to cool b/l, cold to the second digit foot  Neuro: Protective sensation diminished to the level of the digits bilateral.  MSK: Muscle strength 5/5 all major muscle groups bilateral.      A: painful gangrene right second digit         P:   Chart reviewed and Patient evaluated  Discussed diagnosis and treatment with patient  X-rays reviewed   Angiogram from 11/12 reviewed   Toe inspected, DSD applied  Pt to heel WB right foot, full WB left foot   Wound care orders in: apply betadine DSD to right toe and in between the first and second interspace daily. keep clean dry and intact.   Right partial 2nd ray resection planned for today at 4pm  Written consent obtained  Medical clearance obtained 11/11  Patient is to remain NPO  Discussed with pt who demonstrated verbal understanding   Discussed with Dr. Tucker

## 2019-11-14 NOTE — BRIEF OPERATIVE NOTE - TYPE OF ANESTHESIA
Monitored Anesthesia Care with sedation

## 2019-11-14 NOTE — PROGRESS NOTE ADULT - PROBLEM SELECTOR PLAN 1
- c/w cefazolin as per ID  - s/p angioplasty RLE 11/12   - F/u- Podiatry planning Partial 2nd ray amputation Thursday 4PM

## 2019-11-14 NOTE — BRIEF OPERATIVE NOTE - NSICDXBRIEFPROCEDURE_GEN_ALL_CORE_FT
PROCEDURES:  Partial amputation of second ray of right foot by open approach 14-Nov-2019 15:43:53  Josiane Duran
PROCEDURES:  Angiogram, lower extremity, fluoroscopic, with topical ultrasound 07-Nov-2019 16:30:12  Eufemia Gardner
PROCEDURES:  Angioplasty of peripheral artery 12-Nov-2019 18:49:08  Gabriel Hyatt

## 2019-11-14 NOTE — PROGRESS NOTE ADULT - ASSESSMENT
79 year old Divehi speaking male from home lives alone ambulates with cane at baseline with PMH of DM, HTN and PSH open heart surgery presents with toe pain. Reports R 2nd toe pain and wound since tripping and falling on it at [employer] on october 29th while wearing slippers.

## 2019-11-14 NOTE — PROGRESS NOTE ADULT - PROBLEM SELECTOR PLAN 4
- continue metoprolol     - Monitor BP closely and adjust medications if clinically indicated.  - DASH diet

## 2019-11-14 NOTE — BRIEF OPERATIVE NOTE - NSICDXBRIEFPOSTOP_GEN_ALL_CORE_FT
POST-OP DIAGNOSIS:  Gangrene of toe of right foot 07-Nov-2019 16:31:31  Eufemia Gardner
POST-OP DIAGNOSIS:  Gangrene of toe of right foot 07-Nov-2019 16:31:31  Eufemia Gardner  Peripheral arterial disease 07-Nov-2019 16:31:37  Eufemia Gardner
POST-OP DIAGNOSIS:  Peripheral arterial disease 07-Nov-2019 16:31:37  Eufemia Gardner  Gangrene of toe of right foot 07-Nov-2019 16:31:31  Eufemia Gardner

## 2019-11-14 NOTE — PROGRESS NOTE ADULT - SUBJECTIVE AND OBJECTIVE BOX
YEOM, CHANG  79y  Male      Patient is a 79y old  Male who presents with a chief complaint of 2nd right toe pain (14 Nov 2019 07:50)      INTERVAL HPI/OVERNIGHT EVENTS: No acute overnight events.  Patient was made NPO at midnight and did not receive breakfast.  Patient understands he is going to surgery with podiatry for a partial second ray amputation this afternoon.        REVIEW OF SYSTEMS:  CONSTITUTIONAL: No fever   EYES: no visual disturbances  NECK: No pain    RESPIRATORY: No cough; No shortness of breath  CARDIOVASCULAR: No chest pain   GASTROINTESTINAL: Two bowel movements overnight with watery consistency No pain. No nausea or vomiting  NEUROLOGICAL: No headache    MUSCULOSKELETAL: pain in right foot and leg  GENITOURINARY: no dysuria  PSYCHIATRY: no depression   ALL OTHER  ROS negative    FAMILY HISTORY: no significant family history    T(C): 36.4 (11-14-19 @ 07:54), Max: 36.7 (11-13-19 @ 15:47)  HR: 53 (11-14-19 @ 07:54) (53 - 90)  BP: 137/53 (11-14-19 @ 07:54) (117/60 - 137/53)  RR: 18 (11-14-19 @ 07:54) (18 - 18)  SpO2: 94% (11-14-19 @ 07:54) (94% - 96%)  Wt(kg): --Vital Signs Last 24 Hrs  T(C): 36.4 (14 Nov 2019 07:54), Max: 36.7 (13 Nov 2019 15:47)  T(F): 97.6 (14 Nov 2019 07:54), Max: 98.1 (13 Nov 2019 15:47)  HR: 53 (14 Nov 2019 07:54) (53 - 90)  BP: 137/53 (14 Nov 2019 07:54) (117/60 - 137/53)  BP(mean): --  RR: 18 (14 Nov 2019 07:54) (18 - 18)  SpO2: 94% (14 Nov 2019 07:54) (94% - 96%)  No Known Allergies      PHYSICAL EXAM:  GENERAL: Patient seen resting in bed and in mild distress due to foot pain   HEENT: Normocephalic; conjunctivae and sclerae clear   NECK: supple  CHEST/LUNG: Clear to auscultation bilaterally with good air entry   HEART: S1 S2, regular; no murmurs  ABDOMEN: Soft, Nontender, Nondistended; Bowel sounds present  EXTREMITIES: no edema; no calf tenderness;  right 2nd toe discolored, hematoma distal right 2nd toe, No erythema dorsal right foot  SKIN: warm and dry  NERVOUS SYSTEM: Awake and alert; Oriented to place, person and time       Consultant(s) Notes Reviewed:  [x ] YES  [ ] NO  Care Discussed with Consultants/Other Providers [ x] YES  [ ] NO    LABS:                         10.8   6.90  )-----------( 349      ( 13 Nov 2019 07:43 )             31.9   WBC Count: 6.90 K/uL (11-13 @ 07:43)  WBC Count: 8.78 K/uL (11-12 @ 06:01)  WBC Count: 11.83 K/uL (11-11 @ 06:09)  WBC Count: 8.05 K/uL (11-10 @ 05:58)    RADIOLOGY & ADDITIONAL TESTS:    Imaging Personally Reviewed:  [x] YES  [ ] NO  aspirin enteric coated 81 milliGRAM(s) Oral daily  ceFAZolin   IVPB 1000 milliGRAM(s) IV Intermittent every 8 hours  clopidogrel Tablet 75 milliGRAM(s) Oral daily  dextrose 5% + lactated ringers. 1000 milliLiter(s) IV Continuous <Continuous>  gabapentin 300 milliGRAM(s) Oral every 12 hours  influenza   Vaccine 0.5 milliLiter(s) IntraMuscular once  insulin lispro (HumaLOG) corrective regimen sliding scale   SubCutaneous every 6 hours  lactated ringers. 1000 milliLiter(s) IV Continuous <Continuous>  lidocaine   Patch 1 Patch Transdermal daily  metoprolol tartrate 12.5 milliGRAM(s) Oral two times a day  oxyCODONE    IR 10 milliGRAM(s) Oral every 6 hours PRN  simvastatin 40 milliGRAM(s) Oral at bedtime  tamsulosin 0.4 milliGRAM(s) Oral at bedtime      HEALTH ISSUES - PROBLEM Dx:  Dehydration  Hyperglycemia  Dehydration  Dehydration  Goals of care, counseling/discussion: Goals of care, counseling/discussion  Prophylactic measure: Prophylactic measure  Cellulitis of toe of right foot: Cellulitis of toe of right foot  PVD (peripheral vascular disease): PVD (peripheral vascular disease)  Diabetes: Diabetes  HTN (hypertension): HTN (hypertension)  Ischemic ulcer of toe of right foot: Ischemic ulcer of toe of right foot  Acute pain of right foot: Acute pain of right foot

## 2019-11-14 NOTE — PROGRESS NOTE ADULT - PROBLEM SELECTOR PLAN 3
- vascular consult reviewed- unable to obtain P2Y12 test inpatient, can be obtained outpatient or at [facility]  - s/p angioplasty E 11/12  - Podiatry OR Today, Partial 2nd ray amputation

## 2019-11-14 NOTE — BRIEF OPERATIVE NOTE - NSICDXBRIEFPREOP_GEN_ALL_CORE_FT
PRE-OP DIAGNOSIS:  Gangrene of toe of right foot 07-Nov-2019 16:31:25  Eufemia Gardner
PRE-OP DIAGNOSIS:  Gangrene of toe of right foot 07-Nov-2019 16:31:25  Eufemia Gardner  Peripheral arterial disease 07-Nov-2019 16:31:08  Eufemia Gardner
PRE-OP DIAGNOSIS:  Gangrene of toe of right foot 07-Nov-2019 16:31:25  Eufemia Gardner  Peripheral arterial disease 07-Nov-2019 16:31:08  Eufemia Gardner

## 2019-11-14 NOTE — PROGRESS NOTE ADULT - ASSESSMENT
79M s/p angio  - planned partial amputation of second ray of right foot  - requires vascular surveillance

## 2019-11-14 NOTE — PROGRESS NOTE ADULT - ATTENDING COMMENTS
I have seen and examined the patient. case discussed with house staff in detail. I have reviewed and edited the note where appropriate.    78 yo M with DM, htn, hx CAD presented with pain to right second toe x2 weeks. Pt placed on heparin drip and evaluated by vascular team. Angiogram with angioplasty was attempted on 11/8/19 but was unsuccessful.    Planned OR for Right partial second ray amputation  Had 2 episodes  loose stools last night  Had received 8 u Lantus last night. Blood glucose was around 97 this afternoon hence was started on d5 at low rate.  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

## 2019-11-14 NOTE — BRIEF OPERATIVE NOTE - OPERATION/FINDINGS
See operative report
See full operative report
Multiple stenotic lesions of right anterior tibial artery, retrograde access via distal AT with angioplasty of entire AT with 2.5/12cm and 2.0mm/12cm

## 2019-11-15 LAB
ANION GAP SERPL CALC-SCNC: 5 MMOL/L — SIGNIFICANT CHANGE UP (ref 5–17)
BUN SERPL-MCNC: 12 MG/DL — SIGNIFICANT CHANGE UP (ref 7–18)
CALCIUM SERPL-MCNC: 8.4 MG/DL — SIGNIFICANT CHANGE UP (ref 8.4–10.5)
CHLORIDE SERPL-SCNC: 105 MMOL/L — SIGNIFICANT CHANGE UP (ref 96–108)
CO2 SERPL-SCNC: 28 MMOL/L — SIGNIFICANT CHANGE UP (ref 22–31)
CREAT SERPL-MCNC: 0.68 MG/DL — SIGNIFICANT CHANGE UP (ref 0.5–1.3)
GLUCOSE BLDC GLUCOMTR-MCNC: 114 MG/DL — HIGH (ref 70–99)
GLUCOSE BLDC GLUCOMTR-MCNC: 144 MG/DL — HIGH (ref 70–99)
GLUCOSE BLDC GLUCOMTR-MCNC: 197 MG/DL — HIGH (ref 70–99)
GLUCOSE BLDC GLUCOMTR-MCNC: 241 MG/DL — HIGH (ref 70–99)
GLUCOSE SERPL-MCNC: 113 MG/DL — HIGH (ref 70–99)
HCT VFR BLD CALC: 31.3 % — LOW (ref 39–50)
HGB BLD-MCNC: 10.4 G/DL — LOW (ref 13–17)
MCHC RBC-ENTMCNC: 32.5 PG — SIGNIFICANT CHANGE UP (ref 27–34)
MCHC RBC-ENTMCNC: 33.2 GM/DL — SIGNIFICANT CHANGE UP (ref 32–36)
MCV RBC AUTO: 97.8 FL — SIGNIFICANT CHANGE UP (ref 80–100)
NRBC # BLD: 0 /100 WBCS — SIGNIFICANT CHANGE UP (ref 0–0)
PLATELET # BLD AUTO: 371 K/UL — SIGNIFICANT CHANGE UP (ref 150–400)
POTASSIUM SERPL-MCNC: 3.5 MMOL/L — SIGNIFICANT CHANGE UP (ref 3.5–5.3)
POTASSIUM SERPL-SCNC: 3.5 MMOL/L — SIGNIFICANT CHANGE UP (ref 3.5–5.3)
RBC # BLD: 3.2 M/UL — LOW (ref 4.2–5.8)
RBC # FLD: 12.3 % — SIGNIFICANT CHANGE UP (ref 10.3–14.5)
SODIUM SERPL-SCNC: 138 MMOL/L — SIGNIFICANT CHANGE UP (ref 135–145)
WBC # BLD: 9.15 K/UL — SIGNIFICANT CHANGE UP (ref 3.8–10.5)
WBC # FLD AUTO: 9.15 K/UL — SIGNIFICANT CHANGE UP (ref 3.8–10.5)

## 2019-11-15 PROCEDURE — 99233 SBSQ HOSP IP/OBS HIGH 50: CPT | Mod: GC

## 2019-11-15 PROCEDURE — 99231 SBSQ HOSP IP/OBS SF/LOW 25: CPT

## 2019-11-15 RX ORDER — OXYCODONE AND ACETAMINOPHEN 5; 325 MG/1; MG/1
1 TABLET ORAL EVERY 6 HOURS
Refills: 0 | Status: DISCONTINUED | OUTPATIENT
Start: 2019-11-15 | End: 2019-11-15

## 2019-11-15 RX ORDER — LANOLIN ALCOHOL/MO/W.PET/CERES
5 CREAM (GRAM) TOPICAL AT BEDTIME
Refills: 0 | Status: COMPLETED | OUTPATIENT
Start: 2019-11-15 | End: 2019-11-16

## 2019-11-15 RX ORDER — SENNA PLUS 8.6 MG/1
2 TABLET ORAL AT BEDTIME
Refills: 0 | Status: DISCONTINUED | OUTPATIENT
Start: 2019-11-15 | End: 2019-11-18

## 2019-11-15 RX ADMIN — OXYCODONE AND ACETAMINOPHEN 1 TABLET(S): 5; 325 TABLET ORAL at 12:21

## 2019-11-15 RX ADMIN — Medication 4 UNIT(S): at 08:33

## 2019-11-15 RX ADMIN — Medication 81 MILLIGRAM(S): at 11:21

## 2019-11-15 RX ADMIN — Medication 5 MILLIGRAM(S): at 22:22

## 2019-11-15 RX ADMIN — GABAPENTIN 300 MILLIGRAM(S): 400 CAPSULE ORAL at 06:00

## 2019-11-15 RX ADMIN — Medication 100 MILLIGRAM(S): at 13:52

## 2019-11-15 RX ADMIN — OXYCODONE AND ACETAMINOPHEN 2 TABLET(S): 5; 325 TABLET ORAL at 06:00

## 2019-11-15 RX ADMIN — Medication 100 MILLIGRAM(S): at 06:00

## 2019-11-15 RX ADMIN — GABAPENTIN 300 MILLIGRAM(S): 400 CAPSULE ORAL at 17:00

## 2019-11-15 RX ADMIN — Medication 4 UNIT(S): at 12:14

## 2019-11-15 RX ADMIN — CLOPIDOGREL BISULFATE 75 MILLIGRAM(S): 75 TABLET, FILM COATED ORAL at 11:21

## 2019-11-15 RX ADMIN — Medication 100 MILLIGRAM(S): at 22:25

## 2019-11-15 RX ADMIN — OXYCODONE AND ACETAMINOPHEN 1 TABLET(S): 5; 325 TABLET ORAL at 23:30

## 2019-11-15 RX ADMIN — SENNA PLUS 2 TABLET(S): 8.6 TABLET ORAL at 22:22

## 2019-11-15 RX ADMIN — TAMSULOSIN HYDROCHLORIDE 0.4 MILLIGRAM(S): 0.4 CAPSULE ORAL at 22:22

## 2019-11-15 RX ADMIN — Medication 1 INCH(S): at 11:22

## 2019-11-15 RX ADMIN — INSULIN GLARGINE 11 UNIT(S): 100 INJECTION, SOLUTION SUBCUTANEOUS at 22:18

## 2019-11-15 RX ADMIN — Medication 1 INCH(S): at 23:30

## 2019-11-15 RX ADMIN — Medication 4 UNIT(S): at 17:00

## 2019-11-15 RX ADMIN — Medication 4: at 12:14

## 2019-11-15 RX ADMIN — OXYCODONE AND ACETAMINOPHEN 2 TABLET(S): 5; 325 TABLET ORAL at 06:30

## 2019-11-15 RX ADMIN — OXYCODONE AND ACETAMINOPHEN 1 TABLET(S): 5; 325 TABLET ORAL at 11:21

## 2019-11-15 RX ADMIN — SIMVASTATIN 40 MILLIGRAM(S): 20 TABLET, FILM COATED ORAL at 22:22

## 2019-11-15 RX ADMIN — Medication 2: at 17:00

## 2019-11-15 RX ADMIN — OXYCODONE AND ACETAMINOPHEN 1 TABLET(S): 5; 325 TABLET ORAL at 22:37

## 2019-11-15 RX ADMIN — Medication 12.5 MILLIGRAM(S): at 17:00

## 2019-11-15 NOTE — PROGRESS NOTE ADULT - SUBJECTIVE AND OBJECTIVE BOX
79 year old Telugu speaking male from home with PMH of type 2 DM, CAD s/p CABG, and HTN presented with complaint of right toe pain. Endocrinology was consulted for management of uncontrolled type 2 DM with hyperglycemia, A1c 10.9%.     Patient seen and examined at bedside. He underwent right second toe amputation yesterday 11/14/19 with podiatry. He reports pain at the right foot but has good appetite. Patient has snacks from home at bedside. FS reviewed, occasional excursions >200 likely secondary to snacking.       MEDICATIONS  (STANDING):  aspirin enteric coated 81 milliGRAM(s) Oral daily  ceFAZolin   IVPB 1000 milliGRAM(s) IV Intermittent every 8 hours  clopidogrel Tablet 75 milliGRAM(s) Oral daily  gabapentin 300 milliGRAM(s) Oral every 12 hours  influenza   Vaccine 0.5 milliLiter(s) IntraMuscular once  insulin glargine Injectable (LANTUS) 11 Unit(s) SubCutaneous at bedtime  insulin lispro (HumaLOG) corrective regimen sliding scale   SubCutaneous at bedtime  insulin lispro (HumaLOG) corrective regimen sliding scale   SubCutaneous three times a day before meals  insulin lispro Injectable (HumaLOG) 4 Unit(s) SubCutaneous three times a day before meals  metoprolol tartrate 12.5 milliGRAM(s) Oral two times a day  nitroglycerin    2% Ointment 1 Inch(s) Transdermal daily  simvastatin 40 milliGRAM(s) Oral at bedtime  tamsulosin 0.4 milliGRAM(s) Oral at bedtime    MEDICATIONS  (PRN):  oxycodone    5 mG/acetaminophen 325 mG 2 Tablet(s) Oral every 6 hours PRN Severe Pain (7 - 10)  oxycodone    5 mG/acetaminophen 325 mG 1 Tablet(s) Oral every 6 hours PRN Moderate Pain (4 - 6)  polyethylene glycol 3350 17 Gram(s) Oral daily PRN Constipation      REVIEW OF SYSTEMS:  CONSTITUTIONAL: No fever, weight loss, or fatigue  EYES: No eye pain, visual disturbances, or discharge  ENMT:  No difficulty hearing, tinnitus, vertigo; No sinus or throat pain  NECK: No pain or stiffness  RESPIRATORY: No cough, wheezing, chills or hemoptysis; No shortness of breath  CARDIOVASCULAR: No chest pain, palpitations, dizziness, or leg swelling  GASTROINTESTINAL: No abdominal or epigastric pain. No nausea, vomiting, or hematemesis; No diarrhea or constipation. No melena or hematochezia.  GENITOURINARY: No dysuria, frequency, hematuria, or incontinence  NEUROLOGICAL: No headaches, memory loss, loss of strength, numbness, or tremors  SKIN: right foot pain, no itching, burning, or rashes  LYMPH NODES: No enlarged glands  ENDOCRINE: No heat or cold intolerance; No hair loss  MUSCULOSKELETAL: No joint pain or swelling; No muscle, back, or extremity pain  PSYCHIATRIC: No depression, anxiety, mood swings, or difficulty sleeping  HEME/LYMPH: No easy bruising, or bleeding gums  ALLERGY AND IMMUNOLOGIC: No hives or eczema      PHYSICAL EXAM:    VITAL SIGNS  T(C): 36.5 (11-15-19 @ 08:05), Max: 37.1 (11-14-19 @ 16:23)  HR: 72 (11-15-19 @ 11:00) (56 - 72)  BP: 115/53 (11-15-19 @ 11:00) (110/61 - 132/57)  RR: 16 (11-15-19 @ 08:05) (16 - 19)  SpO2: 95% (11-15-19 @ 11:00) (92% - 99%)    General: Alert and oriented. No acute distress.   Eye: Extraocular movements are intact.    HENT:  Normocephalic.    Respiratory: Respirations are non-labored, Symmetrical chest wall expansion.    Gastrointestinal:  Non-distended.    Neurologic:  Alert and oriented X 3, No focal defects, Cranial Nerves II-XII are grossly intact.    Psychiatric:  Cooperative, Appropriate mood & affect.  Ext: s/p right second toe amputation, right foot with dressing in place  SKIN: No rashes or lesions      LABS:                        10.4   9.15  )-----------( 371      ( 15 Nov 2019 07:12 )             31.3     11-15    138  |  105  |  12  ----------------------------<  113<H>  3.5   |  28  |  0.68    Ca    8.4      15 Nov 2019 07:12          CAPILLARY BLOOD GLUCOSE      POCT Blood Glucose.: 241 mg/dL (15 Nov 2019 11:53)  POCT Blood Glucose.: 114 mg/dL (15 Nov 2019 08:27)  POCT Blood Glucose.: 284 mg/dL (14 Nov 2019 21:38)  POCT Blood Glucose.: 132 mg/dL (14 Nov 2019 15:46)

## 2019-11-15 NOTE — PHYSICAL THERAPY INITIAL EVALUATION ADULT - ADDITIONAL COMMENTS
Pt was modified independent in ambulation with st cane, needs assistance with some ADLs, with HHA x 4hours, 2 days Pt lives in elevator accessed apartment building, was modified independent in ambulation with st cane, needs assistance with some ADLs, with HHA x 4hours, 2 days

## 2019-11-15 NOTE — PROGRESS NOTE ADULT - SUBJECTIVE AND OBJECTIVE BOX
Chief Complaint : Patient is a 79y old  Male who presents with a chief complaint of 2nd right toe pain (04 Nov 2019 08:38)    Pt was seen initially bedside in ED holding complaining of pain in his right second toe. Pt states he fell four days ago and came for pain. Pt states he is a diabetic but does not like to take his medications. Pt states he does not see a foot doctor. Pt states he has pain and numbness in his feet and had the pain and numbness before the fall as well.     Pt seen bedside this morning by podiatry team resting. Patient is aware he is going for partial 2nd ray amputation to the right foot this PM, and he endorses not having anything to eat or drink. Patient relates having continued pain in this right second toe and admits to having diarrhea. He denies f/c/n/v or SOB. Written consent obtained. Native Greenlandic speaking medical student  used for direct communication in Green[place]ic. Toe inspected, no physical exam performed today.     Patient admits to  (-) Fevers, (-) Chills, (-) Nausea, (-) Vomiting, (-) Shortness of Breath      PMH:   PSH:    Allergies:No Known Allergies      Labs:                           10.4   9.15  )-----------( 371      ( 15 Nov 2019 07:12 )             31.3     11-15    138  |  105  |  12  ----------------------------<  113<H>  3.5   |  28  |  0.68    Ca    8.4      15 Nov 2019 07:12            WBC Trend  WBC Count: 9.15 K/uL (11.15.19 @ 07:12)  WBC Count: 6.90 K/uL (11.13.19 @ 07:43)  WBC Count: 11.83 K/uL (11.11.19 @ 06:09)  9.63 Date (11-04 @ 06:14)      Vital Signs Last 24 Hrs  T(C): 36.7 (14 Nov 2019 23:49), Max: 37.1 (14 Nov 2019 16:23)  T(F): 98 (14 Nov 2019 23:49), Max: 98.8 (14 Nov 2019 16:23)  HR: 60 (15 Nov 2019 05:57) (56 - 62)  BP: 123/55 (15 Nov 2019 05:57) (110/61 - 132/57)  BP(mean): 80 (14 Nov 2019 15:53) (77 - 82)  RR: 18 (15 Nov 2019 05:57) (17 - 19)  SpO2: 99% (15 Nov 2019 05:57) (92% - 99%)      O:   General: Pleasant  male NAD & AOX3.  No physical exam performed today, per previous exam:  Integument:  Skin warm, dry and supple bilateral.    Derm: sutures intact with no dehiscence, skin well coapted  Vascular: Dorsalis Pedis and Posterior Tibial pulses 1/4.  Capillary re-fill time less then 3 seconds digits 1-5 bilateral. TG warm to cool b/l, cold to the second digit foot  Neuro: Protective sensation diminished to the level of the digits bilateral.  MSK: Muscle strength 5/5 all major muscle groups bilateral.      A: painful gangrene right second digit   s/p partial second ray amputation 11/14        P:   Chart reviewed and Patient evaluated  Discussed diagnosis and treatment with patient  Pt to heel WB right foot, full WB left foot   Wound care orders: apply dsd every other day to surgical site   Pt stable to f/u outpatient at [address] [place] [address]   Discussed with pt who demonstrated verbal understanding   Seen with Dr. Tucker

## 2019-11-15 NOTE — PHYSICAL THERAPY INITIAL EVALUATION ADULT - PERTINENT HX OF CURRENT PROBLEM, REHAB EVAL
patien tto ER due to Right foot -2nd right toe pain
Pt seen for PT re-eval s/p right partial second ray amputation

## 2019-11-15 NOTE — PHYSICAL THERAPY INITIAL EVALUATION ADULT - LIVES WITH, PROFILE
alone/in senior citizen building. Elevator acess. Patient has HHA 4hrsx2 days and adult day care 3 days/wk
alone/in an apartment building, with HHA x 4 hours/2 days

## 2019-11-15 NOTE — PHYSICAL THERAPY INITIAL EVALUATION ADULT - GENERAL OBSERVATIONS, REHAB EVAL
Patient seen supine in bed w/no lines attached, (+) gauze bandage of right foot, c/o pain w/mobility. Pt speaks English and little English and family at bedside assisted w/translation as needed Patient seen supine in bed w/no lines attached, (+) gauze bandage of right foot, c/o pain w/mobility. Pt speaks Vietnamese and little English, Vietnamese  Sal #[id number] used during assessment

## 2019-11-15 NOTE — PROGRESS NOTE ADULT - PROBLEM SELECTOR PLAN 1
- c/w cefazolin as per ID  - s/p angioplasty RLE 11/12 with vascular surgery team  - s/p right partial 2nd ray amputation 11/14 with podiatry team  - f/u PT re-evaluation post surgery - c/w cefazolin as per ID  - s/p angioplasty RLE 11/12 with vascular surgery team  - s/p right partial 2nd ray amputation 11/14 with podiatry team  - f/u PT re-evaluation post surgery  - Patient's preferred location is [facility] in [place]

## 2019-11-15 NOTE — PHYSICAL THERAPY INITIAL EVALUATION ADULT - CRITERIA FOR SKILLED THERAPEUTIC INTERVENTIONS
predicted duration of therapy intervention/anticipated equipment needs at discharge/therapy frequency/anticipated discharge recommendation
risk reduction/prevention/functional limitations in following categories/impairments found

## 2019-11-15 NOTE — PROGRESS NOTE ADULT - SUBJECTIVE AND OBJECTIVE BOX
YEOM, CHANG  79y  Male      Patient is a 79y old  Male who presents with a chief complaint of 2nd right toe pain (15 Nov 2019 07:54)      INTERVAL HPI/OVERNIGHT EVENTS: Patient is s/p right partial second ray amputation 11/14 PM.  Patient states he did not sleep well last night following the surgery.  Patient states he received pain medication in the AM that alleviated the pain. Patient states he voided this AM.      REVIEW OF SYSTEMS:  CONSTITUTIONAL: No fever   EYES: no visual disturbances  NECK: No pain    RESPIRATORY: No cough; No shortness of breath  CARDIOVASCULAR: No chest pain   GASTROINTESTINAL:  No pain. No nausea or vomiting.   NEUROLOGICAL: No headache    MUSCULOSKELETAL: pain in right foot and leg alleviated with medication  GENITOURINARY: no dysuria  PSYCHIATRY: no depression   ALL OTHER  ROS negative    FAMILY HISTORY: no significant family history  FAMILY HISTORY:    T(C): 36.5 (11-15-19 @ 08:05), Max: 37.1 (11-14-19 @ 16:23)  HR: 60 (11-15-19 @ 08:05) (56 - 62)  BP: 120/52 (11-15-19 @ 08:05) (110/61 - 132/57)  RR: 16 (11-15-19 @ 08:05) (16 - 19)  SpO2: 95% (11-15-19 @ 08:05) (92% - 99%)  Wt(kg): --Vital Signs Last 24 Hrs  T(C): 36.5 (15 Nov 2019 08:05), Max: 37.1 (14 Nov 2019 16:23)  T(F): 97.7 (15 Nov 2019 08:05), Max: 98.8 (14 Nov 2019 16:23)  HR: 60 (15 Nov 2019 08:05) (56 - 62)  BP: 120/52 (15 Nov 2019 08:05) (110/61 - 132/57)  BP(mean): 80 (14 Nov 2019 15:53) (77 - 82)  RR: 16 (15 Nov 2019 08:05) (16 - 19)  SpO2: 95% (15 Nov 2019 08:05) (92% - 99%)  No Known Allergies      PHYSICAL EXAM:  GENERAL: Patient seen resting in bed and in mild distress due to foot pain   HEENT: Normocephalic; conjunctivae and sclerae clear   NECK: supple  CHEST/LUNG: Clear to auscultation bilaterally with good air entry   HEART: S1 S2, regular; no murmurs  ABDOMEN: Soft, Nontender, Nondistended; Bowel sounds present  EXTREMITIES: no edema; no calf tenderness;  Surgical dressing intact right foot  SKIN: warm and dry  NERVOUS SYSTEM: Awake and alert; Oriented to place, person and time     Consultant(s) Notes Reviewed:  [x ] YES  [ ] NO  Care Discussed with Consultants/Other Providers [ x] YES  [ ] NO    LABS:  11-15    138  |  105  |  12  ----------------------------<  113<H>  3.5   |  28  |  0.68    Ca    8.4      15 Nov 2019 07:12                          10.4   9.15  )-----------( 371      ( 15 Nov 2019 07:12 )             31.3   WBC Count: 9.15 K/uL (11-15 @ 07:12)  WBC Count: 6.64 K/uL (11-14 @ 12:34)  WBC Count: 6.90 K/uL (11-13 @ 07:43)  WBC Count: 8.78 K/uL (11-12 @ 06:01)  WBC Count: 11.83 K/uL (11-11 @ 06:09)    RADIOLOGY & ADDITIONAL TESTS:  < from: Xray Toes, Right Foot (11.04.19 @ 06:04) >    EXAM:  TOE(S) RIGHT FOOT                        PROCEDURE DATE:  11/04/2019    INTERPRETATION:  Right toes x-rays    Indication: Pain.    3 views of the right toes are acquired without a previous examination for   comparison.    Impression: No evidence for an acute fracture or dislocation.    The joint spaces are preserved.    The osseous mineralization is within normal limits.     APRIL WALKER M.D., ATTENDING RADIOLOGIST  This document has been electronically signed. Nov 4 2019  8:37AM     < end of copied text >    < from: [facility] Physiol Extremity Lower 3+ Level, BI (11.05.19 @ 14:58) >    EXAM:  US PHYSIOL LWR EXT 3+ LEV BI                        PROCEDURE DATE:  11/05/2019    INTERPRETATION:  Clinical indication: Right lower extremity claudication    Comparison: None    Findings: There are biphasic waveforms bilaterally, abnormally dampened   at the level of the metatarsals and left digit. There is no abnormal   segmental pressure gradient in the right calf compatible with focal   segmental disease.    Right LESLIE = 0.71  Left LESLIE = 1.19    IMPRESSION:     Right LESLIE compatible with moderate peripheral vascular disease. Segmental   disease within the right calf.    Normal left LESLIE.  DIANELYS ROLLE M.D., ATTENDING RADIOLOGIST  This document has been electronically signed. Nov 5 2019  3:05PM  < end of copied text >      Imaging Personally Reviewed:  [x ] YES  [ ] NO  aspirin enteric coated 81 milliGRAM(s) Oral daily  ceFAZolin   IVPB 1000 milliGRAM(s) IV Intermittent every 8 hours  clopidogrel Tablet 75 milliGRAM(s) Oral daily  gabapentin 300 milliGRAM(s) Oral every 12 hours  influenza   Vaccine 0.5 milliLiter(s) IntraMuscular once  insulin glargine Injectable (LANTUS) 11 Unit(s) SubCutaneous at bedtime  insulin lispro (HumaLOG) corrective regimen sliding scale   SubCutaneous at bedtime  insulin lispro (HumaLOG) corrective regimen sliding scale   SubCutaneous three times a day before meals  insulin lispro Injectable (HumaLOG) 4 Unit(s) SubCutaneous three times a day before meals  metoprolol tartrate 12.5 milliGRAM(s) Oral two times a day  nitroglycerin    2% Ointment 1 Inch(s) Transdermal daily  oxycodone    5 mG/acetaminophen 325 mG 2 Tablet(s) Oral every 6 hours PRN  oxycodone    5 mG/acetaminophen 325 mG 1 Tablet(s) Oral every 6 hours PRN  polyethylene glycol 3350 17 Gram(s) Oral daily PRN  simvastatin 40 milliGRAM(s) Oral at bedtime  tamsulosin 0.4 milliGRAM(s) Oral at bedtime      HEALTH ISSUES - PROBLEM Dx:  Dehydration  Hyperglycemia  Dehydration  Dehydration  Goals of care, counseling/discussion: Goals of care, counseling/discussion  Prophylactic measure: Prophylactic measure  Cellulitis of toe of right foot: Cellulitis of toe of right foot  PVD (peripheral vascular disease): PVD (peripheral vascular disease)  Diabetes: Diabetes  HTN (hypertension): HTN (hypertension)  Ischemic ulcer of toe of right foot: Ischemic ulcer of toe of right foot  Acute pain of right foot: Acute pain of right foot

## 2019-11-15 NOTE — PROGRESS NOTE ADULT - PROBLEM SELECTOR PLAN 2
- Monitor blood sugars AC/HS and adjust as needed  - goal -180.   - Carbohydrate consistent diabetic diet with evening snacks.  - Endocrine Consult reviewed  - Continue 11 units Lantus, 4 units Humalog before meals 3x a day, Moderate sliding scale with meals.

## 2019-11-15 NOTE — PROGRESS NOTE ADULT - PROBLEM SELECTOR PLAN 1
c/w plavix/ASA/statin therapy  f/u podiatry for local wound care  reconsult vascular surgery as needed

## 2019-11-15 NOTE — PROGRESS NOTE ADULT - SUBJECTIVE AND OBJECTIVE BOX
79M s/p angio for RLE ischemia    Seen in morning. no events overnight. POD 1 from RLE 2nd digit ray amputation.     Vital Signs Last 24 Hrs  T(C): 36.7 (14 Nov 2019 23:49), Max: 37.1 (14 Nov 2019 16:23)  T(F): 98 (14 Nov 2019 23:49), Max: 98.8 (14 Nov 2019 16:23)  HR: 60 (15 Nov 2019 05:57) (53 - 62)  BP: 123/55 (15 Nov 2019 05:57) (110/61 - 137/53)  BP(mean): 80 (14 Nov 2019 15:53) (77 - 82)  RR: 18 (15 Nov 2019 05:57) (17 - 19)  SpO2: 99% (15 Nov 2019 05:57) (92% - 99%)  Awake and alert, RLE dressing in place extremity warm, palpable DP pulse, able to range extremity                          10.4   9.15  )-----------( 371      ( 15 Nov 2019 07:12 )             31.3   11-14    143  |  110<H>  |  7   ----------------------------<  117<H>  3.9   |  26  |  0.59    Ca    8.2<L>      14 Nov 2019 13:26

## 2019-11-15 NOTE — PROGRESS NOTE ADULT - ASSESSMENT
79 year old Turkish speaking male from home with PMH of type 2 DM, CAD s/p CABG, and HTN presented with complaint of right toe pain. Endocrinology was consulted for management of uncontrolled type 2 DM with hyperglycemia, A1c 10.9%.     1. Uncontrolled type 2 DM with hyperglycemia   -FS overall controlled with occasional excursions >200  -continue Lantus 11 units at bedtime   -continue Humalog 4 units TID with meals   -recommend to continue moderate dose mealtime rapid acting insulin as below  BG 70-100mg/dL 0 units  -150 mg/dL 0 units  -200 mg/dL 2 units  -250 mg/dL 4 units  -300 mg/dL 6 units  -350 mg/dL 8 units  -400 mg/dL 10 units  BG > 400mg/dL 12 units  -FS AC HS  -ensure consistent carbohydrate diet   -will monitor FS and adjust accordingly   --if patient is NPO for any reason please change FS and sliding scale to NPO lispro scale     2. Right second toe wound  -s/p RLE angio, vascular follow up  -s/p right 2nd toe amputation 11/14/19  -wound care as per podiatry  -continue antibiotics as per ID  -optimize glycemic control    3. HTN  -BP at goal  -continue losartan and metoprolol    Plan discussed with primary team  Please  call  w/ any questions or concerns [phone]

## 2019-11-15 NOTE — PROGRESS NOTE ADULT - ATTENDING COMMENTS
Patient is a 79y old  Male who presents with a chief complaint of 2nd right toe pain (15 Nov 2019 14:12). S/p right partial second ray amputation on 11/14.     Today:  Patient was seen and examined at bedside  Complains of pain in surgical site    vitals stable       PHYSICAL EXAM:  GENERAL: NAD, well-groomed, well-developed  NERVOUS SYSTEM:  Alert & Oriented X3, Good concentration; no focal deficit   CHEST/LUNG: Clear to auscultation bilaterally; No rales, rhonchi, wheezing, or rubs  HEART: Regular rate and rhythm; No murmurs, rubs, or gallops  ABDOMEN: Soft, Nontender, Nondistended; Bowel sounds present  EXTREMITIES: mild erythema at the surgical site at the right foot, 2+ Peripheral Pulses, No clubbing, cyanosis, or edema    LABS:                      10.4   9.15  )-----------( 371      ( 15 Nov 2019 07:12 )             31.3     138  |  105  |  12  ----------------------------<  113<H>  3.5   |  28  |  0.68    Assessment and plan:  1. Cellulitis of right foot  S/p 2nd ray amputation  Cont Cefazolin   Cont Percocet for severe pain and ketorolac for moderate pain   PT eval- MELISSA    2. PVD:  Cont aspirin, Plavix and statin     3. DM: Cont current management, sugar better controlled   4. HTN: cont metoprolol   5. Supportive: Diet: diabetic, DVT prophylaxis as per podiatry team

## 2019-11-15 NOTE — PROGRESS NOTE ADULT - ASSESSMENT
79 year old Ukrainian speaking male from home lives alone ambulates with cane at baseline with PMH of DM, HTN and PSH open heart surgery presents with toe pain. Reports R 2nd toe pain and wound since tripping and falling on it at [employer] on october 29th while wearing slippers.

## 2019-11-15 NOTE — PROGRESS NOTE ADULT - PROBLEM SELECTOR PLAN 3
- vascular consult reviewed- unable to obtain P2Y12 test inpatient, can be obtained outpatient or at [facility]  - s/p angioplasty RLE 11/12  - s/p right partial 2nd ray amputation 11/14

## 2019-11-15 NOTE — PHYSICAL THERAPY INITIAL EVALUATION ADULT - MANUAL MUSCLE TESTING RESULTS, REHAB EVAL
(B) UE/LE grossly 4+/5; except for left ankle 4-/5 (B) UE/LE grossly 4+/5; except for right ankle 4-/5

## 2019-11-15 NOTE — PHYSICAL THERAPY INITIAL EVALUATION ADULT - DIAGNOSIS, PT EVAL
difficulty with ambulation
difficulty with ambulation, transfers due to c/o increase post op pain and weight bearing restriction

## 2019-11-15 NOTE — PHYSICAL THERAPY INITIAL EVALUATION ADULT - GAIT DEVIATIONS NOTED, PT EVAL
needs constant VCs for WB restriction/increased time in double stance/decreased velocity of limb motion/decreased step length/decreased angelo

## 2019-11-16 LAB
ANION GAP SERPL CALC-SCNC: 6 MMOL/L — SIGNIFICANT CHANGE UP (ref 5–17)
BUN SERPL-MCNC: 10 MG/DL — SIGNIFICANT CHANGE UP (ref 7–18)
CALCIUM SERPL-MCNC: 8.2 MG/DL — LOW (ref 8.4–10.5)
CHLORIDE SERPL-SCNC: 104 MMOL/L — SIGNIFICANT CHANGE UP (ref 96–108)
CO2 SERPL-SCNC: 30 MMOL/L — SIGNIFICANT CHANGE UP (ref 22–31)
CREAT SERPL-MCNC: 0.66 MG/DL — SIGNIFICANT CHANGE UP (ref 0.5–1.3)
GLUCOSE BLDC GLUCOMTR-MCNC: 118 MG/DL — HIGH (ref 70–99)
GLUCOSE BLDC GLUCOMTR-MCNC: 170 MG/DL — HIGH (ref 70–99)
GLUCOSE BLDC GLUCOMTR-MCNC: 236 MG/DL — HIGH (ref 70–99)
GLUCOSE BLDC GLUCOMTR-MCNC: 80 MG/DL — SIGNIFICANT CHANGE UP (ref 70–99)
GLUCOSE SERPL-MCNC: 124 MG/DL — HIGH (ref 70–99)
HCT VFR BLD CALC: 29.2 % — LOW (ref 39–50)
HGB BLD-MCNC: 9.8 G/DL — LOW (ref 13–17)
MCHC RBC-ENTMCNC: 33.1 PG — SIGNIFICANT CHANGE UP (ref 27–34)
MCHC RBC-ENTMCNC: 33.6 GM/DL — SIGNIFICANT CHANGE UP (ref 32–36)
MCV RBC AUTO: 98.6 FL — SIGNIFICANT CHANGE UP (ref 80–100)
NRBC # BLD: 0 /100 WBCS — SIGNIFICANT CHANGE UP (ref 0–0)
PLATELET # BLD AUTO: 353 K/UL — SIGNIFICANT CHANGE UP (ref 150–400)
POTASSIUM SERPL-MCNC: 3.6 MMOL/L — SIGNIFICANT CHANGE UP (ref 3.5–5.3)
POTASSIUM SERPL-SCNC: 3.6 MMOL/L — SIGNIFICANT CHANGE UP (ref 3.5–5.3)
RBC # BLD: 2.96 M/UL — LOW (ref 4.2–5.8)
RBC # FLD: 12 % — SIGNIFICANT CHANGE UP (ref 10.3–14.5)
SODIUM SERPL-SCNC: 140 MMOL/L — SIGNIFICANT CHANGE UP (ref 135–145)
WBC # BLD: 7.99 K/UL — SIGNIFICANT CHANGE UP (ref 3.8–10.5)
WBC # FLD AUTO: 7.99 K/UL — SIGNIFICANT CHANGE UP (ref 3.8–10.5)

## 2019-11-16 PROCEDURE — 99233 SBSQ HOSP IP/OBS HIGH 50: CPT

## 2019-11-16 RX ORDER — NITROGLYCERIN 6.5 MG
5 CAPSULE, EXTENDED RELEASE ORAL DAILY
Refills: 0 | Status: DISCONTINUED | OUTPATIENT
Start: 2019-11-16 | End: 2019-11-18

## 2019-11-16 RX ORDER — OXYCODONE AND ACETAMINOPHEN 5; 325 MG/1; MG/1
1 TABLET ORAL ONCE
Refills: 0 | Status: DISCONTINUED | OUTPATIENT
Start: 2019-11-16 | End: 2019-11-16

## 2019-11-16 RX ADMIN — Medication 100 MILLIGRAM(S): at 22:21

## 2019-11-16 RX ADMIN — Medication 4 UNIT(S): at 12:27

## 2019-11-16 RX ADMIN — Medication 100 MILLIGRAM(S): at 14:50

## 2019-11-16 RX ADMIN — Medication 1: at 22:20

## 2019-11-16 RX ADMIN — INSULIN GLARGINE 11 UNIT(S): 100 INJECTION, SOLUTION SUBCUTANEOUS at 22:21

## 2019-11-16 RX ADMIN — Medication 4: at 12:26

## 2019-11-16 RX ADMIN — Medication 81 MILLIGRAM(S): at 12:26

## 2019-11-16 RX ADMIN — CLOPIDOGREL BISULFATE 75 MILLIGRAM(S): 75 TABLET, FILM COATED ORAL at 12:26

## 2019-11-16 RX ADMIN — Medication 12.5 MILLIGRAM(S): at 06:52

## 2019-11-16 RX ADMIN — SIMVASTATIN 40 MILLIGRAM(S): 20 TABLET, FILM COATED ORAL at 22:22

## 2019-11-16 RX ADMIN — OXYCODONE AND ACETAMINOPHEN 1 TABLET(S): 5; 325 TABLET ORAL at 09:30

## 2019-11-16 RX ADMIN — GABAPENTIN 300 MILLIGRAM(S): 400 CAPSULE ORAL at 06:40

## 2019-11-16 RX ADMIN — Medication 5 MILLIGRAM(S): at 22:22

## 2019-11-16 RX ADMIN — OXYCODONE AND ACETAMINOPHEN 1 TABLET(S): 5; 325 TABLET ORAL at 08:59

## 2019-11-16 RX ADMIN — TAMSULOSIN HYDROCHLORIDE 0.4 MILLIGRAM(S): 0.4 CAPSULE ORAL at 22:22

## 2019-11-16 RX ADMIN — GABAPENTIN 300 MILLIGRAM(S): 400 CAPSULE ORAL at 18:25

## 2019-11-16 RX ADMIN — Medication 4 UNIT(S): at 08:58

## 2019-11-16 RX ADMIN — SENNA PLUS 2 TABLET(S): 8.6 TABLET ORAL at 22:22

## 2019-11-16 RX ADMIN — Medication 100 MILLIGRAM(S): at 06:40

## 2019-11-16 NOTE — PROGRESS NOTE ADULT - SUBJECTIVE AND OBJECTIVE BOX
Chief Complaint : Patient is a 79y old  Male who presents with a chief complaint of 2nd right toe pain (04 Nov 2019 08:38)    Pt was seen initially bedside in ED holding complaining of pain in his right second toe. Pt states he fell four days ago and came for pain. Pt states he is a diabetic but does not like to take his medications. Pt states he does not see a foot doctor. Pt states he has pain and numbness in his feet and had the pain and numbness before the fall as well.     Pt seen bedside this morning resting. Pt states he was cold in his room when trying to sleep last night.     Patient admits to  (-) Fevers, (-) Chills, (-) Nausea, (-) Vomiting, (-) Shortness of Breath      PMH:   PSH:    Allergies:No Known Allergies      Labs:                                      9.8    7.99  )-----------( 353      ( 16 Nov 2019 06:37 )             29.2       11-16    140  |  104  |  10  ----------------------------<  124<H>  3.6   |  30  |  0.66    Ca    8.2<L>      16 Nov 2019 06:37                WBC Trend  WBC Count: 7.99 K/uL (11.16.19 @ 06:37)  WBC Count: 9.15 K/uL (11.15.19 @ 07:12)  WBC Count: 6.90 K/uL (11.13.19 @ 07:43)  WBC Count: 11.83 K/uL (11.11.19 @ 06:09)  9.63 Date (11-04 @ 06:14)      Vital Signs Last 24 Hrs  T(C): 36.3 (16 Nov 2019 08:04), Max: 37 (15 Nov 2019 23:50)  T(F): 97.4 (16 Nov 2019 08:04), Max: 98.6 (15 Nov 2019 23:50)  HR: 54 (16 Nov 2019 08:04) (54 - 72)  BP: 143/56 (16 Nov 2019 08:04) (115/53 - 143/56)  BP(mean): --  RR: 16 (16 Nov 2019 08:04) (16 - 16)  SpO2: 99% (16 Nov 2019 08:04) (95% - 99%)    O:   General: Pleasant  male NAD & AOX3.  No physical exam performed today, per previous exam:  Integument:  Skin warm, dry and supple bilateral.    Derm: sutures intact with no dehiscence, skin well coapted  Vascular: Dorsalis Pedis and Posterior Tibial pulses 1/4.  Capillary re-fill time less then 3 seconds digits 1-5 bilateral. TG warm to cool b/l, cold to the second digit foot  Neuro: Protective sensation diminished to the level of the digits bilateral.  MSK: Muscle strength 5/5 all major muscle groups bilateral.      A: painful gangrene right second digit   s/p partial second ray amputation 11/14        P:   Chart reviewed and Patient evaluated  Discussed diagnosis and treatment with patient  Pt to heel WB right foot, full WB left foot  Applied nitrobid to dorsal and plantar flap and over DP and PT    Wound care orders: apply dsd every other day to surgical site   Pt stable to f/u outpatient at [facility]   Discussed with pt who demonstrated verbal understanding   Seen with Dr. Tucker

## 2019-11-16 NOTE — PROGRESS NOTE ADULT - PROBLEM SELECTOR PLAN 1
- c/w cefazolin as per ID  - s/p angioplasty RLE 11/12 with vascular surgery team  - s/p right partial 2nd ray amputation 11/14 with podiatry team  - f/u PT re-evaluation post surgery  - Patient's preferred location is [facility] in [place]

## 2019-11-16 NOTE — PROGRESS NOTE ADULT - PROBLEM SELECTOR PLAN 3
- Monitor blood sugars AC/HS and adjust as needed  - goal -180.   - Carbohydrate consistent diabetic diet with evening snacks.  - Endocrine Consult appreciated  - Continue 11 units Lantus, 4 units Humalog before meals 3x a day, Moderate sliding scale with meals.

## 2019-11-16 NOTE — PROGRESS NOTE ADULT - PROBLEM SELECTOR PLAN 2
- now s/p - s/p right partial 2nd ray amputation POD# 2 (s/p angiogram 11/12/19)  -c/w aspirin/plavix  -vascular consult appreciated- unable to obtain P2Y12 test inpatient, can be obtained outpatient or at [facility]

## 2019-11-16 NOTE — PROGRESS NOTE ADULT - PROBLEM SELECTOR PROBLEM 3
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

## 2019-11-16 NOTE — PROGRESS NOTE ADULT - ASSESSMENT
79 year old Albanian speaking male from home lives alone ambulates with cane at baseline with PMH of DM, HTN and PSH open heart surgery presents with toe pain. Reports R 2nd toe pain and wound since tripping and falling on it at [employer] on october 29th while wearing slippers.

## 2019-11-16 NOTE — PROGRESS NOTE ADULT - SUBJECTIVE AND OBJECTIVE BOX
Patient is a 79y old  Male who presents with a chief complaint of 2nd right toe pain (16 Nov 2019 08:49)      INTERVAL HPI/OVERNIGHT EVENTS: no new complaints    MEDICATIONS  (STANDING):  aspirin enteric coated 81 milliGRAM(s) Oral daily  ceFAZolin   IVPB 1000 milliGRAM(s) IV Intermittent every 8 hours  clopidogrel Tablet 75 milliGRAM(s) Oral daily  gabapentin 300 milliGRAM(s) Oral every 12 hours  influenza   Vaccine 0.5 milliLiter(s) IntraMuscular once  insulin glargine Injectable (LANTUS) 11 Unit(s) SubCutaneous at bedtime  insulin lispro (HumaLOG) corrective regimen sliding scale   SubCutaneous at bedtime  insulin lispro (HumaLOG) corrective regimen sliding scale   SubCutaneous three times a day before meals  insulin lispro Injectable (HumaLOG) 4 Unit(s) SubCutaneous three times a day before meals  metoprolol tartrate 12.5 milliGRAM(s) Oral two times a day  nitroglycerin    2% Ointment 1 Inch(s) Transdermal daily  nitroglycerin    2% Ointment 5 Inch(s) Transdermal daily  senna 2 Tablet(s) Oral at bedtime  simvastatin 40 milliGRAM(s) Oral at bedtime  tamsulosin 0.4 milliGRAM(s) Oral at bedtime    MEDICATIONS  (PRN):  oxycodone    5 mG/acetaminophen 325 mG 2 Tablet(s) Oral every 6 hours PRN Severe Pain (7 - 10)  polyethylene glycol 3350 17 Gram(s) Oral daily PRN Constipation      __________________________________________________  REVIEW OF SYSTEMS:    CONSTITUTIONAL: No fever,   EYES: no acute visual disturbances  NECK: No pain or stiffness  RESPIRATORY: No cough; No shortness of breath  CARDIOVASCULAR: No chest pain, no palpitations  GASTROINTESTINAL: No pain. No nausea or vomiting; No diarrhea   NEUROLOGICAL: No headache or numbness, no tremors  MUSCULOSKELETAL: No joint pain, no muscle pain  GENITOURINARY: no dysuria, no frequency, no hesitancy  PSYCHIATRY: no depression , no anxiety  ALL OTHER  ROS negative        Vital Signs Last 24 Hrs  T(C): 36.6 (17 Nov 2019 07:45), Max: 36.7 (17 Nov 2019 00:14)  T(F): 97.8 (17 Nov 2019 07:45), Max: 98 (17 Nov 2019 00:14)  HR: 62 (17 Nov 2019 07:45) (51 - 62)  BP: 139/57 (17 Nov 2019 07:45) (118/51 - 139/57)  BP(mean): 68 (16 Nov 2019 15:52) (68 - 68)  RR: 20 (17 Nov 2019 07:45) (17 - 20)  SpO2: 95% (17 Nov 2019 07:45) (95% - 97%)    ________________________________________________  PHYSICAL EXAM:  GENERAL: NAD  HEENT: Normocephalic;  conjunctivae and sclerae clear; moist mucous membranes;   NECK : supple  CHEST/LUNG: Clear to auscultation bilaterally with good air entry   HEART: S1 S2  regular; no murmurs, gallops or rubs  ABDOMEN: Soft, Nontender, Nondistended; Bowel sounds present  EXTREMITIES: no cyanosis; no edema; no calf tenderness  SKIN: warm and dry; no rash  NERVOUS SYSTEM:  Awake and alert; Oriented  to place, person and time ; no new deficits    _________________________________________________  LABS:             wbc  hgb  creatinine        CAPILLARY BLOOD GLUCOSE      POCT Blood Glucose.: 170 mg/dL (16 Nov 2019 21:27)  POCT Blood Glucose.: 80 mg/dL (16 Nov 2019 17:14)  POCT Blood Glucose.: 236 mg/dL (16 Nov 2019 11:50)  POCT Blood Glucose.: 118 mg/dL (16 Nov 2019 08:20)        RADIOLOGY & ADDITIONAL TESTS:    Imaging Personally Reviewed:  YES/NO    Consultant(s) Notes Reviewed:   YES/ No    Care Discussed with Consultants :     Plan of care was discussed with patient and /or primary care giver; all questions and concerns were addressed and care was aligned with patient's wishes. Patient is a 79y old  Male who presents with a chief complaint of 2nd right toe pain (16 Nov 2019 08:49)      INTERVAL HPI/OVERNIGHT EVENTS: c/o right foot pain, though improved from yesterday. Some alleviation with pain medication    MEDICATIONS  (STANDING):  aspirin enteric coated 81 milliGRAM(s) Oral daily  ceFAZolin   IVPB 1000 milliGRAM(s) IV Intermittent every 8 hours  clopidogrel Tablet 75 milliGRAM(s) Oral daily  gabapentin 300 milliGRAM(s) Oral every 12 hours  influenza   Vaccine 0.5 milliLiter(s) IntraMuscular once  insulin glargine Injectable (LANTUS) 11 Unit(s) SubCutaneous at bedtime  insulin lispro (HumaLOG) corrective regimen sliding scale   SubCutaneous at bedtime  insulin lispro (HumaLOG) corrective regimen sliding scale   SubCutaneous three times a day before meals  insulin lispro Injectable (HumaLOG) 4 Unit(s) SubCutaneous three times a day before meals  metoprolol tartrate 12.5 milliGRAM(s) Oral two times a day  nitroglycerin    2% Ointment 1 Inch(s) Transdermal daily  nitroglycerin    2% Ointment 5 Inch(s) Transdermal daily  senna 2 Tablet(s) Oral at bedtime  simvastatin 40 milliGRAM(s) Oral at bedtime  tamsulosin 0.4 milliGRAM(s) Oral at bedtime    MEDICATIONS  (PRN):  oxycodone    5 mG/acetaminophen 325 mG 2 Tablet(s) Oral every 6 hours PRN Severe Pain (7 - 10)  polyethylene glycol 3350 17 Gram(s) Oral daily PRN Constipation      __________________________________________________  REVIEW OF SYSTEMS:    CONSTITUTIONAL: No fever,   EYES: no acute visual disturbances  NECK: No pain or stiffness  RESPIRATORY: No cough; No shortness of breath  CARDIOVASCULAR: No chest pain, no palpitations  GASTROINTESTINAL: No pain. No nausea or vomiting; No diarrhea   NEUROLOGICAL: No headache or numbness, no tremors  MUSCULOSKELETAL: No joint pain, no muscle pain  GENITOURINARY: no dysuria, no frequency, no hesitancy  PSYCHIATRY: no depression , no anxiety  ALL OTHER  ROS negative        Vital Signs Last 24 Hrs  T(C): 36.6 (17 Nov 2019 07:45), Max: 36.7 (17 Nov 2019 00:14)  T(F): 97.8 (17 Nov 2019 07:45), Max: 98 (17 Nov 2019 00:14)  HR: 62 (17 Nov 2019 07:45) (51 - 62)  BP: 139/57 (17 Nov 2019 07:45) (118/51 - 139/57)  BP(mean): 68 (16 Nov 2019 15:52) (68 - 68)  RR: 20 (17 Nov 2019 07:45) (17 - 20)  SpO2: 95% (17 Nov 2019 07:45) (95% - 97%)    ________________________________________________  PHYSICAL EXAM:  GENERAL: NAD  HEENT: Normocephalic;  conjunctivae and sclerae clear; moist mucous membranes;   NECK : supple  CHEST/LUNG: Clear to auscultation bilaterally with good air entry   HEART: S1 S2  regular; no murmurs, gallops or rubs  ABDOMEN: Soft, Nontender, Nondistended; Bowel sounds present  EXTREMITIES: s/p R toe amputation, dressing in place  SKIN: warm and dry; no rash  NERVOUS SYSTEM:  Awake and alert; Oriented  to place, person and time ; no new deficits    _________________________________________________  LABS:             wbc: 7.99  hgb: 29.2  creatinine: 0.66    CAPILLARY BLOOD GLUCOSE      POCT Blood Glucose.: 170 mg/dL (16 Nov 2019 21:27)  POCT Blood Glucose.: 80 mg/dL (16 Nov 2019 17:14)  POCT Blood Glucose.: 236 mg/dL (16 Nov 2019 11:50)  POCT Blood Glucose.: 118 mg/dL (16 Nov 2019 08:20)        RADIOLOGY & ADDITIONAL TESTS:    Imaging Personally Reviewed:  YES/NO    Consultant(s) Notes Reviewed:   YES/ No    Care Discussed with Consultants :     Plan of care was discussed with patient and /or primary care giver; all questions and concerns were addressed and care was aligned with patient's wishes.

## 2019-11-17 DIAGNOSIS — E11.52 TYPE 2 DIABETES MELLITUS WITH DIABETIC PERIPHERAL ANGIOPATHY WITH GANGRENE: ICD-10-CM

## 2019-11-17 DIAGNOSIS — E11.9 TYPE 2 DIABETES MELLITUS WITHOUT COMPLICATIONS: ICD-10-CM

## 2019-11-17 LAB
ALBUMIN SERPL ELPH-MCNC: 2.5 G/DL — LOW (ref 3.5–5)
ALP SERPL-CCNC: 79 U/L — SIGNIFICANT CHANGE UP (ref 40–120)
ALT FLD-CCNC: 23 U/L DA — SIGNIFICANT CHANGE UP (ref 10–60)
ANION GAP SERPL CALC-SCNC: 8 MMOL/L — SIGNIFICANT CHANGE UP (ref 5–17)
AST SERPL-CCNC: 18 U/L — SIGNIFICANT CHANGE UP (ref 10–40)
BILIRUB SERPL-MCNC: 0.4 MG/DL — SIGNIFICANT CHANGE UP (ref 0.2–1.2)
BUN SERPL-MCNC: 8 MG/DL — SIGNIFICANT CHANGE UP (ref 7–18)
CALCIUM SERPL-MCNC: 8.6 MG/DL — SIGNIFICANT CHANGE UP (ref 8.4–10.5)
CHLORIDE SERPL-SCNC: 105 MMOL/L — SIGNIFICANT CHANGE UP (ref 96–108)
CO2 SERPL-SCNC: 28 MMOL/L — SIGNIFICANT CHANGE UP (ref 22–31)
CREAT SERPL-MCNC: 0.62 MG/DL — SIGNIFICANT CHANGE UP (ref 0.5–1.3)
GLUCOSE BLDC GLUCOMTR-MCNC: 100 MG/DL — HIGH (ref 70–99)
GLUCOSE BLDC GLUCOMTR-MCNC: 132 MG/DL — HIGH (ref 70–99)
GLUCOSE BLDC GLUCOMTR-MCNC: 204 MG/DL — HIGH (ref 70–99)
GLUCOSE BLDC GLUCOMTR-MCNC: 205 MG/DL — HIGH (ref 70–99)
GLUCOSE BLDC GLUCOMTR-MCNC: 322 MG/DL — HIGH (ref 70–99)
GLUCOSE SERPL-MCNC: 106 MG/DL — HIGH (ref 70–99)
HCT VFR BLD CALC: 31.5 % — LOW (ref 39–50)
HGB BLD-MCNC: 10.7 G/DL — LOW (ref 13–17)
MCHC RBC-ENTMCNC: 33 PG — SIGNIFICANT CHANGE UP (ref 27–34)
MCHC RBC-ENTMCNC: 34 GM/DL — SIGNIFICANT CHANGE UP (ref 32–36)
MCV RBC AUTO: 97.2 FL — SIGNIFICANT CHANGE UP (ref 80–100)
NRBC # BLD: 0 /100 WBCS — SIGNIFICANT CHANGE UP (ref 0–0)
PLATELET # BLD AUTO: 396 K/UL — SIGNIFICANT CHANGE UP (ref 150–400)
POTASSIUM SERPL-MCNC: 3.8 MMOL/L — SIGNIFICANT CHANGE UP (ref 3.5–5.3)
POTASSIUM SERPL-SCNC: 3.8 MMOL/L — SIGNIFICANT CHANGE UP (ref 3.5–5.3)
PROT SERPL-MCNC: 6.5 G/DL — SIGNIFICANT CHANGE UP (ref 6–8.3)
RBC # BLD: 3.24 M/UL — LOW (ref 4.2–5.8)
RBC # FLD: 12.2 % — SIGNIFICANT CHANGE UP (ref 10.3–14.5)
SODIUM SERPL-SCNC: 141 MMOL/L — SIGNIFICANT CHANGE UP (ref 135–145)
WBC # BLD: 7.51 K/UL — SIGNIFICANT CHANGE UP (ref 3.8–10.5)
WBC # FLD AUTO: 7.51 K/UL — SIGNIFICANT CHANGE UP (ref 3.8–10.5)

## 2019-11-17 PROCEDURE — 99232 SBSQ HOSP IP/OBS MODERATE 35: CPT | Mod: GC

## 2019-11-17 RX ADMIN — INSULIN GLARGINE 11 UNIT(S): 100 INJECTION, SOLUTION SUBCUTANEOUS at 23:49

## 2019-11-17 RX ADMIN — Medication 4: at 12:00

## 2019-11-17 RX ADMIN — Medication 81 MILLIGRAM(S): at 12:00

## 2019-11-17 RX ADMIN — CLOPIDOGREL BISULFATE 75 MILLIGRAM(S): 75 TABLET, FILM COATED ORAL at 11:59

## 2019-11-17 RX ADMIN — Medication 100 MILLIGRAM(S): at 14:32

## 2019-11-17 RX ADMIN — OXYCODONE AND ACETAMINOPHEN 2 TABLET(S): 5; 325 TABLET ORAL at 06:50

## 2019-11-17 RX ADMIN — TAMSULOSIN HYDROCHLORIDE 0.4 MILLIGRAM(S): 0.4 CAPSULE ORAL at 23:49

## 2019-11-17 RX ADMIN — OXYCODONE AND ACETAMINOPHEN 2 TABLET(S): 5; 325 TABLET ORAL at 13:30

## 2019-11-17 RX ADMIN — Medication 1 INCH(S): at 12:00

## 2019-11-17 RX ADMIN — Medication 100 MILLIGRAM(S): at 05:51

## 2019-11-17 RX ADMIN — OXYCODONE AND ACETAMINOPHEN 2 TABLET(S): 5; 325 TABLET ORAL at 12:08

## 2019-11-17 RX ADMIN — Medication 5 INCH(S): at 12:02

## 2019-11-17 RX ADMIN — Medication 2: at 23:50

## 2019-11-17 RX ADMIN — OXYCODONE AND ACETAMINOPHEN 2 TABLET(S): 5; 325 TABLET ORAL at 05:54

## 2019-11-17 RX ADMIN — Medication 100 MILLIGRAM(S): at 23:49

## 2019-11-17 RX ADMIN — GABAPENTIN 300 MILLIGRAM(S): 400 CAPSULE ORAL at 17:38

## 2019-11-17 RX ADMIN — SIMVASTATIN 40 MILLIGRAM(S): 20 TABLET, FILM COATED ORAL at 23:49

## 2019-11-17 RX ADMIN — GABAPENTIN 300 MILLIGRAM(S): 400 CAPSULE ORAL at 05:52

## 2019-11-17 RX ADMIN — SENNA PLUS 2 TABLET(S): 8.6 TABLET ORAL at 23:49

## 2019-11-17 RX ADMIN — Medication 4 UNIT(S): at 12:00

## 2019-11-17 NOTE — PROGRESS NOTE ADULT - SUBJECTIVE AND OBJECTIVE BOX
Chief Complaint : Patient is a 79y old  Male who presents with a chief complaint of 2nd right toe pain (04 Nov 2019 08:38)    Pt was seen initially bedside in ED holding complaining of pain in his right second toe. Pt states he fell four days ago and came for pain. Pt states he is a diabetic but does not like to take his medications. Pt states he does not see a foot doctor. Pt states he has pain and numbness in his feet and had the pain and numbness before the fall as well.     Pt seen bedside this morning resting. Pt denies any foot related complaint    Patient admits to  (-) Fevers, (-) Chills, (-) Nausea, (-) Vomiting, (-) Shortness of Breath      PMH:   PSH:    Allergies:No Known Allergies      Labs:                        10.7   7.51  )-----------( 396      ( 17 Nov 2019 07:21 )             31.5     chem:  11-17    141  |  105  |  8   ----------------------------<  106<H>  3.8   |  28  |  0.62    Ca    8.6      17 Nov 2019 07:21    TPro  6.5  /  Alb  2.5<L>  /  TBili  0.4  /  DBili  x   /  AST  18  /  ALT  23  /  AlkPhos  79  11-17    Vital Signs Last 24 Hrs  T(C): 36.6 (17 Nov 2019 07:45), Max: 36.7 (17 Nov 2019 00:14)  T(F): 97.8 (17 Nov 2019 07:45), Max: 98 (17 Nov 2019 00:14)  HR: 62 (17 Nov 2019 07:45) (51 - 62)  BP: 139/57 (17 Nov 2019 07:45) (118/51 - 139/57)  BP(mean): 68 (16 Nov 2019 15:52) (68 - 68)  RR: 20 (17 Nov 2019 07:45) (17 - 20)  SpO2: 95% (17 Nov 2019 07:45) (95% - 97%)    O:   General: Pleasant  male NAD & AOX3.  No physical exam performed today, per previous exam:  Integument:  Skin warm, dry and supple bilateral.    Derm: sutures intact with no dehiscence, skin well coapted  Vascular: Dorsalis Pedis and Posterior Tibial pulses 1/4.  Capillary re-fill time less then 3 seconds digits 1-5 bilateral. TG warm to cool b/l, cold to the second digit foot  Neuro: Protective sensation diminished to the level of the digits bilateral.  MSK: Muscle strength 5/5 all major muscle groups bilateral.      A: painful gangrene right second digit   s/p partial second ray amputation 11/14        P:   Chart reviewed and Patient evaluated  Discussed diagnosis and treatment with patient  Pt to heel WB right foot, full WB left foot  Applied nitrobid to dorsal and plantar flap and over DP and PT    Wound care orders: apply dsd every other day to surgical site   Pt stable to f/u outpatient at [facility]   Discussed with pt who demonstrated verbal understanding   Discussed with Dr. Tucker

## 2019-11-17 NOTE — PROGRESS NOTE ADULT - SUBJECTIVE AND OBJECTIVE BOX
YEOM, CHANG  79y  Male      Patient is a 79y old  Male who presents with a chief complaint of 2nd right toe pain (16 Nov 2019 17:10)      INTERVAL HPI/OVERNIGHT EVENTS: No acute overnight events.  Patients blood sugars show improvement.  Patient states his pain has decreased and is relieved with pain medication.  Patient states he has voided and had bowel movements.        CONSTITUTIONAL: No fever,   EYES: no acute visual disturbances  NECK: No pain or stiffness  RESPIRATORY: No cough; No shortness of breath  CARDIOVASCULAR: No chest pain, no palpitations  GASTROINTESTINAL: No pain. No nausea or vomiting; No diarrhea   NEUROLOGICAL: No headache or numbness, no tremors  MUSCULOSKELETAL: Mild pain right leg and foot  GENITOURINARY: no dysuria, no frequency, no hesitancy  PSYCHIATRY: no depression , no anxiety  ALL OTHER  ROS negative      T(C): 36.6 (11-17-19 @ 07:45), Max: 36.7 (11-17-19 @ 00:14)  HR: 62 (11-17-19 @ 07:45) (51 - 62)  BP: 139/57 (11-17-19 @ 07:45) (118/51 - 139/57)  RR: 20 (11-17-19 @ 07:45) (17 - 20)  SpO2: 95% (11-17-19 @ 07:45) (95% - 97%)  Wt(kg): --Vital Signs Last 24 Hrs  T(C): 36.6 (17 Nov 2019 07:45), Max: 36.7 (17 Nov 2019 00:14)  T(F): 97.8 (17 Nov 2019 07:45), Max: 98 (17 Nov 2019 00:14)  HR: 62 (17 Nov 2019 07:45) (51 - 62)  BP: 139/57 (17 Nov 2019 07:45) (118/51 - 139/57)  BP(mean): 68 (16 Nov 2019 15:52) (68 - 68)  RR: 20 (17 Nov 2019 07:45) (17 - 20)  SpO2: 95% (17 Nov 2019 07:45) (95% - 97%)  No Known Allergies    PHYSICAL EXAM:  GENERAL: Patient seen resting in bed and in mild distress due to foot pain   HEENT: Normocephalic; conjunctivae and sclerae clear   NECK: supple  CHEST/LUNG: Clear to auscultation bilaterally with good air entry   HEART: S1 S2, regular; no murmurs  ABDOMEN: Soft, Nontender, Nondistended; Bowel sounds present  EXTREMITIES: no edema; no calf tenderness;  Surgical dressing intact right foot  SKIN: warm and dry  NERVOUS SYSTEM: Awake and alert; Oriented to place, person and time       Consultant(s) Notes Reviewed:  [x ] YES  [ ] NO  Care Discussed with Consultants/Other Providers [ x] YES  [ ] NO    LABS:  11-17    141  |  105  |  8   ----------------------------<  106<H>  3.8   |  28  |  0.62    Ca    8.6      17 Nov 2019 07:21    TPro  6.5  /  Alb  2.5<L>  /  TBili  0.4  /  DBili  x   /  AST  18  /  ALT  23  /  AlkPhos  79  11-17                          10.7   7.51  )-----------( 396      ( 17 Nov 2019 07:21 )             31.5   WBC Count: 7.51 K/uL (11-17 @ 07:21)  WBC Count: 7.99 K/uL (11-16 @ 06:37)  WBC Count: 9.15 K/uL (11-15 @ 07:12)  WBC Count: 6.64 K/uL (11-14 @ 12:34)  WBC Count: 6.90 K/uL (11-13 @ 07:43)    RADIOLOGY & ADDITIONAL TESTS:  < from: Xray Foot AP + Lateral + Oblique, Right (11.14.19 @ 21:43) >    EXAM:  FOOT RIGHT (MINIMUM 3 VIEWS)                            PROCEDURE DATE:  11/14/2019          INTERPRETATION:  CLINICAL INDICATION: 79 years Male with post op xray.    COMPARISON: 11/4/2019    AP, lateral and oblique radiographs of the right foot were obtained.    Since the prior study, the patient has undergone amputation of the second   toe at the level of the second MTP. There is soft tissue swelling and   postsurgical air in the operative bed.    There is no fracture, subluxation or dislocation. No lytic or blastic   lesions are identified.    There is no periosteal reaction or cortical disruption to suggest   osteomyelitis.    The osseous mineralization is normal.    No soft tissue abnormality is seen.    IMPRESSION:    Interval second toe amputation.    BARBARA HENAO M.D., ATTENDING RADIOLOGIST  This document has been electronically signed. Nov 15 2019  1:01PM  < end of copied text >    < from: [facility] Physiol Extremity Lower 3+ Level, BI (11.05.19 @ 14:58) >    EXAM:  US PHYSIOL LWR EXT 3+ LEV BI                            PROCEDURE DATE:  11/05/2019          INTERPRETATION:  Clinical indication: Right lower extremity claudication    Comparison: None    Findings: There are biphasic waveforms bilaterally, abnormally dampened   at the level of the metatarsals and left digit. There is no abnormal   segmental pressure gradient in the right calf compatible with focal   segmental disease.    Right LESLIE = 0.71  Left LESLIE = 1.19    IMPRESSION:     Right LESLIE compatible with moderate peripheral vascular disease. Segmental   disease within the right calf.    Normal left LESLIE.  DIANELYS ROLLE M.D., ATTENDING RADIOLOGIST  This document has been electronically signed. Nov 5 2019  3:05PM  < end of copied text >        Imaging Personally Reviewed:  [x ] YES  [ ] NO  aspirin enteric coated 81 milliGRAM(s) Oral daily  ceFAZolin   IVPB 1000 milliGRAM(s) IV Intermittent every 8 hours  clopidogrel Tablet 75 milliGRAM(s) Oral daily  gabapentin 300 milliGRAM(s) Oral every 12 hours  influenza   Vaccine 0.5 milliLiter(s) IntraMuscular once  insulin glargine Injectable (LANTUS) 11 Unit(s) SubCutaneous at bedtime  insulin lispro (HumaLOG) corrective regimen sliding scale   SubCutaneous at bedtime  insulin lispro (HumaLOG) corrective regimen sliding scale   SubCutaneous three times a day before meals  insulin lispro Injectable (HumaLOG) 4 Unit(s) SubCutaneous three times a day before meals  metoprolol tartrate 12.5 milliGRAM(s) Oral two times a day  nitroglycerin    2% Ointment 1 Inch(s) Transdermal daily  nitroglycerin    2% Ointment 5 Inch(s) Transdermal daily  oxycodone    5 mG/acetaminophen 325 mG 2 Tablet(s) Oral every 6 hours PRN  polyethylene glycol 3350 17 Gram(s) Oral daily PRN  senna 2 Tablet(s) Oral at bedtime  simvastatin 40 milliGRAM(s) Oral at bedtime  tamsulosin 0.4 milliGRAM(s) Oral at bedtime      HEALTH ISSUES - PROBLEM Dx:  Dehydration  Hyperglycemia  Dehydration  Dehydration  Goals of care, counseling/discussion: Goals of care, counseling/discussion  Prophylactic measure: Prophylactic measure  Cellulitis of toe of right foot: Cellulitis of toe of right foot  PVD (peripheral vascular disease): PVD (peripheral vascular disease)  Diabetes: Diabetes  HTN (hypertension): HTN (hypertension)  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

## 2019-11-17 NOTE — PROGRESS NOTE ADULT - ATTENDING COMMENTS
Patient seen/evaluated at bedside 11/17/19. I agree with the resident progress note/outlined plan of care. My independent findings and conclusions are documented.    Yi : [id number]  Pt states he had vomiting x 1 this am which he initially attributed to po pain medication. OVerall, he reports pain at right foot improved. Later this evening, was called to patient bedside for reports of patient refusal of lantus. At that time, pt stated the lantus had caused his n/v episode. Had long conversation w/ pt regarding benefits of insulin and importance of glycemic control after which he agreed to take the lantus.     PE  AAOx3, non toxic appearing/NAD  right foot: slightly receding erythema at right foot extending to right second toe + tender to palpation  R 2nd toe partial ray amputation    ABIs: Right LESLIE compatible with moderate peripheral vascular disease. Segmental   disease within the right calf. Normal left LESLIE.    1. right toe/foot cellulitis  2. 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 DM T II w/ severe hyperglycemia (hgbA1c>10)  4. HTN  5. PAD  6. BPH    c/w kefzol  c/w lantus and lispro at current doses  c/w asa/plavix/statin  dvt ppx  flomax  heel weight bearing on right foot  await transfer to [facility]

## 2019-11-17 NOTE — PROGRESS NOTE ADULT - ASSESSMENT
79 year old Occitan speaking male from home lives alone ambulates with cane at baseline with PMH of DM, HTN and PSH open heart surgery presents with toe pain. Reports R 2nd toe pain and wound since tripping and falling on it at [employer] on october 29th while wearing slippers.

## 2019-11-17 NOTE — PROGRESS NOTE ADULT - PROBLEM SELECTOR PROBLEM 6
Goals of care, counseling/discussion

## 2019-11-17 NOTE — PROGRESS NOTE ADULT - PROBLEM SELECTOR PLAN 1
- c/w cefazolin as per ID  - s/p angioplasty RLE 11/12 with vascular surgery team  - s/p right partial 2nd ray amputation 11/14 with podiatry team  - PT re-evaluation post surgery- rec MELISSA  - [place] [facility]

## 2019-11-18 ENCOUNTER — TRANSCRIPTION ENCOUNTER (OUTPATIENT)
Age: 79
End: 2019-11-18

## 2019-11-18 VITALS
SYSTOLIC BLOOD PRESSURE: 125 MMHG | DIASTOLIC BLOOD PRESSURE: 64 MMHG | RESPIRATION RATE: 16 BRPM | TEMPERATURE: 98 F | OXYGEN SATURATION: 97 % | HEART RATE: 59 BPM

## 2019-11-18 LAB
GLUCOSE BLDC GLUCOMTR-MCNC: 106 MG/DL — HIGH (ref 70–99)
GLUCOSE BLDC GLUCOMTR-MCNC: 161 MG/DL — HIGH (ref 70–99)
HCT VFR BLD CALC: 29.7 % — LOW (ref 39–50)
HGB BLD-MCNC: 10 G/DL — LOW (ref 13–17)
MCHC RBC-ENTMCNC: 32.7 PG — SIGNIFICANT CHANGE UP (ref 27–34)
MCHC RBC-ENTMCNC: 33.7 GM/DL — SIGNIFICANT CHANGE UP (ref 32–36)
MCV RBC AUTO: 97.1 FL — SIGNIFICANT CHANGE UP (ref 80–100)
NRBC # BLD: 0 /100 WBCS — SIGNIFICANT CHANGE UP (ref 0–0)
PLATELET # BLD AUTO: 386 K/UL — SIGNIFICANT CHANGE UP (ref 150–400)
RBC # BLD: 3.06 M/UL — LOW (ref 4.2–5.8)
RBC # FLD: 12.3 % — SIGNIFICANT CHANGE UP (ref 10.3–14.5)
SURGICAL PATHOLOGY STUDY: SIGNIFICANT CHANGE UP
WBC # BLD: 7.3 K/UL — SIGNIFICANT CHANGE UP (ref 3.8–10.5)
WBC # FLD AUTO: 7.3 K/UL — SIGNIFICANT CHANGE UP (ref 3.8–10.5)

## 2019-11-18 PROCEDURE — 84443 ASSAY THYROID STIM HORMONE: CPT

## 2019-11-18 PROCEDURE — 86901 BLOOD TYPING SEROLOGIC RH(D): CPT

## 2019-11-18 PROCEDURE — 88305 TISSUE EXAM BY PATHOLOGIST: CPT

## 2019-11-18 PROCEDURE — 82607 VITAMIN B-12: CPT

## 2019-11-18 PROCEDURE — C1725: CPT

## 2019-11-18 PROCEDURE — C1894: CPT

## 2019-11-18 PROCEDURE — 99285 EMERGENCY DEPT VISIT HI MDM: CPT | Mod: 25

## 2019-11-18 PROCEDURE — 93306 TTE W/DOPPLER COMPLETE: CPT

## 2019-11-18 PROCEDURE — 88311 DECALCIFY TISSUE: CPT

## 2019-11-18 PROCEDURE — 82553 CREATINE MB FRACTION: CPT

## 2019-11-18 PROCEDURE — 86900 BLOOD TYPING SEROLOGIC ABO: CPT

## 2019-11-18 PROCEDURE — 76000 FLUOROSCOPY <1 HR PHYS/QHP: CPT

## 2019-11-18 PROCEDURE — 73630 X-RAY EXAM OF FOOT: CPT

## 2019-11-18 PROCEDURE — 83735 ASSAY OF MAGNESIUM: CPT

## 2019-11-18 PROCEDURE — 82306 VITAMIN D 25 HYDROXY: CPT

## 2019-11-18 PROCEDURE — 82962 GLUCOSE BLOOD TEST: CPT

## 2019-11-18 PROCEDURE — 82550 ASSAY OF CK (CPK): CPT

## 2019-11-18 PROCEDURE — 93005 ELECTROCARDIOGRAM TRACING: CPT

## 2019-11-18 PROCEDURE — 82010 KETONE BODYS QUAN: CPT

## 2019-11-18 PROCEDURE — C1887: CPT

## 2019-11-18 PROCEDURE — C1889: CPT

## 2019-11-18 PROCEDURE — 84100 ASSAY OF PHOSPHORUS: CPT

## 2019-11-18 PROCEDURE — 80061 LIPID PANEL: CPT

## 2019-11-18 PROCEDURE — 82746 ASSAY OF FOLIC ACID SERUM: CPT

## 2019-11-18 PROCEDURE — 73660 X-RAY EXAM OF TOE(S): CPT

## 2019-11-18 PROCEDURE — 99231 SBSQ HOSP IP/OBS SF/LOW 25: CPT

## 2019-11-18 PROCEDURE — 86140 C-REACTIVE PROTEIN: CPT

## 2019-11-18 PROCEDURE — 80202 ASSAY OF VANCOMYCIN: CPT

## 2019-11-18 PROCEDURE — 80053 COMPREHEN METABOLIC PANEL: CPT

## 2019-11-18 PROCEDURE — 93923 UPR/LXTR ART STDY 3+ LVLS: CPT

## 2019-11-18 PROCEDURE — 85730 THROMBOPLASTIN TIME PARTIAL: CPT

## 2019-11-18 PROCEDURE — 86850 RBC ANTIBODY SCREEN: CPT

## 2019-11-18 PROCEDURE — 85610 PROTHROMBIN TIME: CPT

## 2019-11-18 PROCEDURE — 83605 ASSAY OF LACTIC ACID: CPT

## 2019-11-18 PROCEDURE — 87040 BLOOD CULTURE FOR BACTERIA: CPT

## 2019-11-18 PROCEDURE — 84484 ASSAY OF TROPONIN QUANT: CPT

## 2019-11-18 PROCEDURE — 85027 COMPLETE CBC AUTOMATED: CPT

## 2019-11-18 PROCEDURE — 83036 HEMOGLOBIN GLYCOSYLATED A1C: CPT

## 2019-11-18 PROCEDURE — 85652 RBC SED RATE AUTOMATED: CPT

## 2019-11-18 PROCEDURE — 36415 COLL VENOUS BLD VENIPUNCTURE: CPT

## 2019-11-18 PROCEDURE — 71045 X-RAY EXAM CHEST 1 VIEW: CPT

## 2019-11-18 PROCEDURE — C1769: CPT

## 2019-11-18 PROCEDURE — 80048 BASIC METABOLIC PNL TOTAL CA: CPT

## 2019-11-18 RX ORDER — ASPIRIN/CALCIUM CARB/MAGNESIUM 324 MG
1 TABLET ORAL
Qty: 0 | Refills: 0 | DISCHARGE
Start: 2019-11-18

## 2019-11-18 RX ORDER — GABAPENTIN 400 MG/1
1 CAPSULE ORAL
Qty: 0 | Refills: 0 | DISCHARGE
Start: 2019-11-18

## 2019-11-18 RX ORDER — POLYETHYLENE GLYCOL 3350 17 G/17G
17 POWDER, FOR SOLUTION ORAL
Qty: 0 | Refills: 0 | DISCHARGE
Start: 2019-11-18

## 2019-11-18 RX ORDER — GLIPIZIDE/METFORMIN HCL 2.5-500 MG
2 TABLET ORAL
Qty: 0 | Refills: 0 | DISCHARGE

## 2019-11-18 RX ORDER — PIOGLITAZONE HYDROCHLORIDE 15 MG/1
1 TABLET ORAL
Qty: 0 | Refills: 0 | DISCHARGE

## 2019-11-18 RX ORDER — SENNA PLUS 8.6 MG/1
2 TABLET ORAL
Qty: 0 | Refills: 0 | DISCHARGE
Start: 2019-11-18

## 2019-11-18 RX ORDER — LINAGLIPTIN 5 MG/1
1 TABLET, FILM COATED ORAL
Qty: 0 | Refills: 0 | DISCHARGE

## 2019-11-18 RX ORDER — LANOLIN ALCOHOL/MO/W.PET/CERES
3 CREAM (GRAM) TOPICAL ONCE
Refills: 0 | Status: COMPLETED | OUTPATIENT
Start: 2019-11-18 | End: 2019-11-18

## 2019-11-18 RX ORDER — INSULIN LISPRO 100/ML
4 VIAL (ML) SUBCUTANEOUS
Qty: 1000 | Refills: 0
Start: 2019-11-18 | End: 2019-12-17

## 2019-11-18 RX ORDER — EMPAGLIFLOZIN 10 MG/1
1 TABLET, FILM COATED ORAL
Qty: 0 | Refills: 0 | DISCHARGE

## 2019-11-18 RX ORDER — INSULIN GLARGINE 100 [IU]/ML
11 INJECTION, SOLUTION SUBCUTANEOUS
Qty: 0 | Refills: 0 | DISCHARGE
Start: 2019-11-18

## 2019-11-18 RX ORDER — LANOLIN ALCOHOL/MO/W.PET/CERES
3 CREAM (GRAM) TOPICAL AT BEDTIME
Refills: 0 | Status: DISCONTINUED | OUTPATIENT
Start: 2019-11-18 | End: 2019-11-18

## 2019-11-18 RX ORDER — CEPHALEXIN 500 MG
1 CAPSULE ORAL
Qty: 16 | Refills: 0
Start: 2019-11-18 | End: 2019-11-21

## 2019-11-18 RX ADMIN — GABAPENTIN 300 MILLIGRAM(S): 400 CAPSULE ORAL at 06:36

## 2019-11-18 RX ADMIN — Medication 12.5 MILLIGRAM(S): at 06:35

## 2019-11-18 RX ADMIN — Medication 100 MILLIGRAM(S): at 14:11

## 2019-11-18 RX ADMIN — Medication 1 INCH(S): at 00:02

## 2019-11-18 RX ADMIN — OXYCODONE AND ACETAMINOPHEN 2 TABLET(S): 5; 325 TABLET ORAL at 13:20

## 2019-11-18 RX ADMIN — CLOPIDOGREL BISULFATE 75 MILLIGRAM(S): 75 TABLET, FILM COATED ORAL at 12:27

## 2019-11-18 RX ADMIN — OXYCODONE AND ACETAMINOPHEN 2 TABLET(S): 5; 325 TABLET ORAL at 12:27

## 2019-11-18 RX ADMIN — Medication 4 UNIT(S): at 12:27

## 2019-11-18 RX ADMIN — Medication 2: at 12:27

## 2019-11-18 RX ADMIN — Medication 100 MILLIGRAM(S): at 06:35

## 2019-11-18 RX ADMIN — Medication 4 UNIT(S): at 08:47

## 2019-11-18 RX ADMIN — Medication 81 MILLIGRAM(S): at 12:26

## 2019-11-18 RX ADMIN — Medication 5 INCH(S): at 00:03

## 2019-11-18 NOTE — PROGRESS NOTE ADULT - REASON FOR ADMISSION
2nd right toe pain

## 2019-11-18 NOTE — CHART NOTE - NSCHARTNOTEFT_GEN_A_CORE
Assessment:   Patient reports [ ] nausea  [ ] vomiting [ ] diarrhea [ ] constipation  [ ]chewing problems [ ] swallowing issues  [ ] other:  Pt visited. Pt  is French speaking but able to understand / communicate in English.  Friend ( [employer] member) at bedside. Pt reports eating good. Labs Noted . S/P R Partial   2 nd Toe ND ray Amputation. Meds Noted Pt being DC to NH today.    PO intake:   Source for PO intake [x] Patient/family [ ] chart [ ] staff     Current Weight:   % Weight Change    Pertinent Medications: MEDICATIONS  (STANDING):  aspirin enteric coated 81 milliGRAM(s) Oral daily  ceFAZolin   IVPB 1000 milliGRAM(s) IV Intermittent every 8 hours  clopidogrel Tablet 75 milliGRAM(s) Oral daily  gabapentin 300 milliGRAM(s) Oral every 12 hours  influenza   Vaccine 0.5 milliLiter(s) IntraMuscular once  insulin glargine Injectable (LANTUS) 11 Unit(s) SubCutaneous at bedtime  insulin lispro (HumaLOG) corrective regimen sliding scale   SubCutaneous at bedtime  insulin lispro (HumaLOG) corrective regimen sliding scale   SubCutaneous three times a day before meals  insulin lispro Injectable (HumaLOG) 4 Unit(s) SubCutaneous three times a day before meals  melatonin 3 milliGRAM(s) Oral at bedtime  metoprolol tartrate 12.5 milliGRAM(s) Oral two times a day  nitroglycerin    2% Ointment 1 Inch(s) Transdermal daily  nitroglycerin    2% Ointment 5 Inch(s) Transdermal daily  senna 2 Tablet(s) Oral at bedtime  simvastatin 40 milliGRAM(s) Oral at bedtime  tamsulosin 0.4 milliGRAM(s) Oral at bedtime    MEDICATIONS  (PRN):  oxycodone    5 mG/acetaminophen 325 mG 2 Tablet(s) Oral every 6 hours PRN Severe Pain (7 - 10)  polyethylene glycol 3350 17 Gram(s) Oral daily PRN Constipation    Pertinent Labs:  11-17 Na141 mmol/L Glu 106 mg/dL<H> K+ 3.8 mmol/L Cr  0.62 mg/dL BUN 8 mg/dL 11-17 Alb 2.5 g/dL<L> 11-05 [id number] 10.9 %<H> 11-05 Chol 179 mg/dL  mg/dL HDL 45 mg/dL Trig 99 mg/dL      Skin:     Estimated Needs:   [ ] no change since previous assessment  [ ] recalculated:       Previous Nutrition Diagnosis:   [ ] Inadequate Energy Intake [ ]Inadequate Oral Intake [ ] Excessive Energy Intake   [ ] Underweight [ ] Increased Nutrient Needs [ ] Overweight/Obesity   [ ] Altered GI Function [ ] Unintended Weight Loss [ ] Food & Nutrition Related Knowledge Deficit [ ] Malnutrition   (X) Altered Nutrition Related Labs Continuos.     Nutrition Diagnosis is [x ] ongoing  [ ] resolved [ ] not applicable     New Nutrition Diagnosis: [ ] not applicable  [ ] Inadequate Energy Intake [ ]Inadequate Oral Intake [ ] Excessive Energy Intake   [ ] Underweight [ ] Increased Nutrient Needs [ ] Overweight/Obesity   [ ] Altered GI Function [ ] Unintended Weight Loss [ ] Food & Nutrition Related Knowledge Deficit [ ] Malnutrition     Related to:     As evidenced by:     Interventions:   Recommend  [ ] Change Diet To:  [ ] Nutrition Supplement  [ ] Nutrition Support  [ x] Other: Continue with current diet     Monitoring and Evaluation:   [ ] PO intake [ ] Tolerance to diet prescription [ ] weights [ ] follow up per protocol  [ ] other:

## 2019-11-18 NOTE — PROGRESS NOTE ADULT - SUBJECTIVE AND OBJECTIVE BOX
79 year old Yi speaking male from home with PMH of type 2 DM, CAD s/p CABG, and HTN presented with complaint of right toe pain. Endocrinology was consulted for management of uncontrolled type 2 DM with hyperglycemia, A1c 10.9%.     Patient seen and examined at bedside. He reports right lower extremity pain but has been able to ambulate with a walker. FS reviewed. BG overall well controlled with few excursions >200 (due to snacking food from home). Plan for discharge to rehab facility as per primary team.    MEDICATIONS  (STANDING):  aspirin enteric coated 81 milliGRAM(s) Oral daily  ceFAZolin   IVPB 1000 milliGRAM(s) IV Intermittent every 8 hours  clopidogrel Tablet 75 milliGRAM(s) Oral daily  gabapentin 300 milliGRAM(s) Oral every 12 hours  influenza   Vaccine 0.5 milliLiter(s) IntraMuscular once  insulin glargine Injectable (LANTUS) 11 Unit(s) SubCutaneous at bedtime  insulin lispro (HumaLOG) corrective regimen sliding scale   SubCutaneous at bedtime  insulin lispro (HumaLOG) corrective regimen sliding scale   SubCutaneous three times a day before meals  insulin lispro Injectable (HumaLOG) 4 Unit(s) SubCutaneous three times a day before meals  melatonin 3 milliGRAM(s) Oral at bedtime  metoprolol tartrate 12.5 milliGRAM(s) Oral two times a day  nitroglycerin    2% Ointment 1 Inch(s) Transdermal daily  nitroglycerin    2% Ointment 5 Inch(s) Transdermal daily  senna 2 Tablet(s) Oral at bedtime  simvastatin 40 milliGRAM(s) Oral at bedtime  tamsulosin 0.4 milliGRAM(s) Oral at bedtime    MEDICATIONS  (PRN):  oxycodone    5 mG/acetaminophen 325 mG 2 Tablet(s) Oral every 6 hours PRN Severe Pain (7 - 10)  polyethylene glycol 3350 17 Gram(s) Oral daily PRN Constipation      REVIEW OF SYSTEMS:  CONSTITUTIONAL: No fever, weight loss, or fatigue  EYES: No eye pain, visual disturbances, or discharge  ENMT:  No difficulty hearing, tinnitus, vertigo; No sinus or throat pain  NECK: No pain or stiffness  RESPIRATORY: No cough, wheezing, chills or hemoptysis; No shortness of breath  CARDIOVASCULAR: No chest pain, palpitations, dizziness, or leg swelling  GASTROINTESTINAL: No abdominal or epigastric pain. No nausea, vomiting, or hematemesis; No diarrhea or constipation. No melena or hematochezia.  GENITOURINARY: No dysuria, frequency, hematuria, or incontinence  NEUROLOGICAL: No headaches, memory loss, loss of strength, numbness, or tremors  SKIN: right foot pain, no itching, burning, or rashes  LYMPH NODES: No enlarged glands  ENDOCRINE: No heat or cold intolerance; No hair loss  MUSCULOSKELETAL: No joint pain or swelling; No muscle, back, or extremity pain  PSYCHIATRIC: No depression, anxiety, mood swings, or difficulty sleeping  HEME/LYMPH: No easy bruising, or bleeding gums  ALLERGY AND IMMUNOLOGIC: No hives or eczema    PHYSICAL EXAM:    VITAL SIGNS  T(C): 36.4 (11-18-19 @ 08:25), Max: 36.7 (11-18-19 @ 00:13)  HR: 55 (11-18-19 @ 08:25) (52 - 65)  BP: 128/51 (11-18-19 @ 08:25) (128/51 - 151/51)  RR: 17 (11-18-19 @ 08:25) (17 - 19)  SpO2: 97% (11-18-19 @ 08:25) (96% - 98%)    General: Alert and oriented. No acute distress.   Eye: Extraocular movements are intact.    HENT:  Normocephalic.    Respiratory: Respirations are non-labored, Symmetrical chest wall expansion.    Gastrointestinal:  Non-distended.    Neurologic:  Alert and oriented X 3, No focal defects, Cranial Nerves II-XII are grossly intact.    Psychiatric:  Cooperative, Appropriate mood & affect.  Ext: s/p right second toe amputation, right foot with dressing in place  SKIN: No rashes or lesions    LABS:                        10.0   7.30  )-----------( 386      ( 18 Nov 2019 07:41 )             29.7     11-17    141  |  105  |  8   ----------------------------<  106<H>  3.8   |  28  |  0.62    Ca    8.6      17 Nov 2019 07:21    TPro  6.5  /  Alb  2.5<L>  /  TBili  0.4  /  DBili  x   /  AST  18  /  ALT  23  /  AlkPhos  79  11-17        CAPILLARY BLOOD GLUCOSE      POCT Blood Glucose.: 161 mg/dL (18 Nov 2019 12:10)  POCT Blood Glucose.: 106 mg/dL (18 Nov 2019 08:44)  POCT Blood Glucose.: 204 mg/dL (17 Nov 2019 23:44)  POCT Blood Glucose.: 322 mg/dL (17 Nov 2019 21:54)  POCT Blood Glucose.: 132 mg/dL (17 Nov 2019 16:44)

## 2019-11-18 NOTE — PROGRESS NOTE ADULT - ASSESSMENT
79 year old Japanese speaking male from home with PMH of type 2 DM, CAD s/p CABG, and HTN presented with complaint of right toe pain. Endocrinology was consulted for management of uncontrolled type 2 DM with hyperglycemia, A1c 10.9%.     1. Uncontrolled type 2 DM with hyperglycemia   -FS overall controlled with occasional excursions >200 due to snacking  -continue Lantus 11 units at bedtime   -continue Humalog 4 units TID with meals   -recommend to continue moderate dose mealtime rapid acting insulin as below  BG 70-100mg/dL 0 units  -150 mg/dL 0 units  -200 mg/dL 2 units  -250 mg/dL 4 units  -300 mg/dL 6 units  -350 mg/dL 8 units  -400 mg/dL 10 units  BG > 400mg/dL 12 units  -FS AC HS  -ensure consistent carbohydrate diet   -will monitor FS and adjust accordingly   --if patient is NPO for any reason please change FS and sliding scale to NPO lispro scale   -On discharge, continue above regimen as patient will be discharged to rehab facility    2. Right second toe wound  -s/p RLE angio, vascular follow up  -s/p right 2nd toe amputation 11/14/19  -wound care as per podiatry  -continue antibiotics as per ID  -optimize glycemic control    3. HTN  -BP at goal  -continue losartan and metoprolol    Plan discussed with primary team  Please  call  w/ any questions or concerns [phone]

## 2019-11-18 NOTE — PROGRESS NOTE ADULT - SUBJECTIVE AND OBJECTIVE BOX
Chief Complaint : Patient is a 79y old  Male who presents with a chief complaint of 2nd right toe pain (04 Nov 2019 08:38)    Pt was seen initially bedside in ED holding complaining of pain in his right second toe. Pt states he fell four days ago and came for pain. Pt states he is a diabetic but does not like to take his medications. Pt states he does not see a foot doctor. Pt states he has pain and numbness in his feet and had the pain and numbness before the fall as well.     Pt seen bedside this morning resting. Pt states he is leaving today.     Patient admits to  (-) Fevers, (-) Chills, (-) Nausea, (-) Vomiting, (-) Shortness of Breath      PMH:   PSH:    Allergies:No Known Allergies      Labs:                                                     10.0   7.30  )-----------( 386      ( 18 Nov 2019 07:41 )             29.7       11-17    141  |  105  |  8   ----------------------------<  106<H>  3.8   |  28  |  0.62    Ca    8.6      17 Nov 2019 07:21    TPro  6.5  /  Alb  2.5<L>  /  TBili  0.4  /  DBili  x   /  AST  18  /  ALT  23  /  AlkPhos  79  11-17          WBC Trend  WBC Count: 7.30 K/uL (11.18.19 @ 07:41)  WBC Count: 7.99 K/uL (11.16.19 @ 06:37)  WBC Count: 9.15 K/uL (11.15.19 @ 07:12)  WBC Count: 6.90 K/uL (11.13.19 @ 07:43)  WBC Count: 11.83 K/uL (11.11.19 @ 06:09)  9.63 Date (11-04 @ 06:14)      Vital Signs Last 24 Hrs  T(C): 36.3 (16 Nov 2019 08:04), Max: 37 (15 Nov 2019 23:50)  T(F): 97.4 (16 Nov 2019 08:04), Max: 98.6 (15 Nov 2019 23:50)  HR: 54 (16 Nov 2019 08:04) (54 - 72)  BP: 143/56 (16 Nov 2019 08:04) (115/53 - 143/56)  BP(mean): --  RR: 16 (16 Nov 2019 08:04) (16 - 16)  SpO2: 99% (16 Nov 2019 08:04) (95% - 99%)    O:   General: Pleasant  male NAD & AOX3.  No physical exam performed today, per previous exam:  Integument:  Skin warm, dry and supple bilateral.    Derm: sutures intact with no dehiscence, skin well coapted  Vascular: Dorsalis Pedis and Posterior Tibial pulses 1/4.  Capillary re-fill time less then 3 seconds digits 1-5 bilateral. TG warm to cool b/l, cold to the second digit foot  Neuro: Protective sensation diminished to the level of the digits bilateral.  MSK: Muscle strength 5/5 all major muscle groups bilateral.      A: painful gangrene right second digit   s/p partial second ray amputation 11/14        P:   Chart reviewed and Patient evaluated  Discussed diagnosis and treatment with patient  Pt to heel WB right foot, full WB left foot  Wound care orders: apply 4x4 gauze and ysabel every other day to surgical site..  Pt stable to f/u outpatient at [address] [facility]   Discussed with pt who demonstrated verbal understanding   Discussed with Dr. Tucker

## 2019-12-11 ENCOUNTER — EMERGENCY (EMERGENCY)
Facility: HOSPITAL | Age: 79
LOS: 1 days | Discharge: HOME | End: 2019-12-11
Attending: EMERGENCY MEDICINE
Payer: MEDICARE

## 2019-12-11 VITALS
RESPIRATION RATE: 20 BRPM | TEMPERATURE: 98 F | HEART RATE: 76 BPM | SYSTOLIC BLOOD PRESSURE: 148 MMHG | DIASTOLIC BLOOD PRESSURE: 79 MMHG | WEIGHT: 145.95 LBS | HEIGHT: 67 IN | OXYGEN SATURATION: 100 %

## 2019-12-11 DIAGNOSIS — Z98.890 OTHER SPECIFIED POSTPROCEDURAL STATES: Chronic | ICD-10-CM

## 2019-12-11 PROBLEM — E11.9 TYPE 2 DIABETES MELLITUS WITHOUT COMPLICATIONS: Chronic | Status: ACTIVE | Noted: 2019-11-04

## 2019-12-11 PROCEDURE — 99282 EMERGENCY DEPT VISIT SF MDM: CPT

## 2019-12-11 PROCEDURE — 99284 EMERGENCY DEPT VISIT MOD MDM: CPT

## 2019-12-11 NOTE — ED PROVIDER NOTE - ATTENDING CONTRIBUTION TO CARE
seen tati magaña  s/p toe amputation patient is a diabetic  sent here for suture removal  podiatry called  advised patient be seen next week in podiatry clinic  agree with acps assessment hx and physical

## 2019-12-11 NOTE — ED PROVIDER NOTE - PHYSICAL EXAMINATION
Orthopedic/wound exam:  Sutures noted to right foot area of amputation  No redness, No pain, No swelling, No discharge,  Site intact

## 2019-12-11 NOTE — ED PROVIDER NOTE - PROGRESS NOTE DETAILS
Podiatry at bedside evaluating Patient nontoxic and medically stable for discharge. Results as applicable discussed with patient. Return precautions provided and patient understands to return to the ED for concerning or worsening signs and symptoms. Instructed to follow up with podiatry and agreeable. Patient's questions answered.

## 2019-12-11 NOTE — CONSULT NOTE ADULT - SUBJECTIVE AND OBJECTIVE BOX
S : 79y year old Male seen at bedside for Right foot ulceration.  Patient relates to having the ulceration s/p right second toe amputation 11/29. Pt denies any pain or foot related complaints. Pt did not have dressing or surgical shoe on and states they came here for surgical follow up . Pt was given Podiatry clinic card with written information for follow up.  :      Patient admits to  (-) Fevers, (-) Chills, (-) Nausea, (-) Vomiting, (-) Shortness of Breath      PMH: Diabetes    PSH:History of open heart surgery      Allergies:No Known Allergies      Labs:      WBC Trend  7.30 Date (11-18 @ 07:41)      Chem              T(F): 98 (12-11-19 @ 14:07), Max: 98 (12-11-19 @ 14:07)  HR: 76 (12-11-19 @ 14:07) (76 - 76)  BP: 148/79 (12-11-19 @ 14:07) (148/79 - 148/79)  RR: 20 (12-11-19 @ 14:07) (20 - 20)  SpO2: 100% (12-11-19 @ 14:07) (100% - 100%)  Wt(kg): --    O:   General: Pleasant  male NAD & AOX3.    Integument:  Skin warm, dry and supple bilateral.    surgical site intact well coapted, - hyperkeratotic border, wound intact at second interspace, mild maceration at lateral border of head of 5th metatarsal left foot - edema, - sheila-wound erythema, - purulence, - fluctuance, - tracking/tunneling, - probe to bone.   Vascular: Dorsalis Pedis and Posterior Tibial pulses 2/4.  Capillary re-fill time less then 3 seconds digits 1-5 bilateral.    Neuro: Protective sensation diminished to the level of the digits bilateral.  MSK: Muscle strength 5/5 all major muscle groups bilateral.  Deformity:  A: s/p Right foot second toe amputation 11/29      P:   Chart reviewed and Patient evaluated  Discussed diagnosis and treatment with patient  Wound flush with normal saline  Applied betadine with dry sterile dressing  surgical shoe was given  Weight bearing in surgical shoe right foot   Discussed importance of daily foot examinations and proper shoe gear and to importance of lower Fasting Blood Glucose levels.   Pt and wife was provided written information to follow in clinic next week  Discussed with Attending Dr Grant

## 2019-12-11 NOTE — ED PROVIDER NOTE - CLINICAL SUMMARY MEDICAL DECISION MAKING FREE TEXT BOX
Will consult surgical team for further management. No acute complaints. 79 YOM for suture check/ possible removal at toe amputation site. Will consult podiatry for further management. No other acute complaints.

## 2019-12-11 NOTE — ED PROVIDER NOTE - NSFOLLOWUPCLINICS_GEN_ALL_ED_FT
[facility]  Podiatry/Wound Care  [address] [place] [address]  Phone: [phone]  Fax: [phone]  Follow Up Time:

## 2019-12-11 NOTE — ED PROVIDER NOTE - PATIENT PORTAL LINK FT
You can access the FollowMyHealth Patient Portal offered by [facility] by registering at the following website: [email or web address]. By joining [employer]’s FollowMyHealth portal, you will also be able to view your health information using other applications (apps) compatible with our system.

## 2019-12-11 NOTE — ED PROVIDER NOTE - OBJECTIVE STATEMENT
78 y/o M patient with a significant PMHx of DM, HTN, and a significant PSHx of Open heart surgery presents to the ED for a suture removal after right 2nd toe removal on November 29th, 2019. Patient states he was told to come to the ED for a suture removal. Patient denies fever, chills, chest pain, SOB, discharge from the wound, and any other complaints. Patient unsure who performed the surgery but states he was told to come to the ED today at noon. NKDA.

## 2019-12-18 ENCOUNTER — APPOINTMENT (OUTPATIENT)
Dept: WOUND CARE | Facility: CLINIC | Age: 79
End: 2019-12-18

## 2019-12-18 ENCOUNTER — OUTPATIENT (OUTPATIENT)
Dept: OUTPATIENT SERVICES | Facility: HOSPITAL | Age: 79
LOS: 1 days | End: 2019-12-18
Payer: MEDICARE

## 2019-12-18 VITALS
DIASTOLIC BLOOD PRESSURE: 75 MMHG | OXYGEN SATURATION: 100 % | HEIGHT: 65.75 IN | TEMPERATURE: 97.3 F | WEIGHT: 121 LBS | SYSTOLIC BLOOD PRESSURE: 109 MMHG | RESPIRATION RATE: 18 BRPM | HEART RATE: 78 BPM | BODY MASS INDEX: 19.68 KG/M2

## 2019-12-18 DIAGNOSIS — Z98.890 OTHER SPECIFIED POSTPROCEDURAL STATES: Chronic | ICD-10-CM

## 2019-12-18 DIAGNOSIS — L97.512 NON-PRESSURE CHRONIC ULCER OF OTHER PART OF RIGHT FOOT WITH FAT LAYER EXPOSED: ICD-10-CM

## 2019-12-18 DIAGNOSIS — E11.9 TYPE 2 DIABETES MELLITUS W/OUT COMPLICATIONS: ICD-10-CM

## 2019-12-18 DIAGNOSIS — E78.5 HYPERLIPIDEMIA, UNSPECIFIED: ICD-10-CM

## 2019-12-18 DIAGNOSIS — E11.621 TYPE 2 DIABETES MELLITUS WITH FOOT ULCER: ICD-10-CM

## 2019-12-18 DIAGNOSIS — N40.0 BENIGN PROSTATIC HYPERPLASIA WITHOUT LOWER URINARY TRACT SYMPMS: ICD-10-CM

## 2019-12-18 DIAGNOSIS — I10 ESSENTIAL (PRIMARY) HYPERTENSION: ICD-10-CM

## 2019-12-18 DIAGNOSIS — Z00.00 ENCOUNTER FOR GENERAL ADULT MEDICAL EXAMINATION WITHOUT ABNORMAL FINDINGS: ICD-10-CM

## 2019-12-18 PROCEDURE — G0463: CPT

## 2019-12-18 RX ORDER — PIOGLITAZONE 30 MG/1
30 TABLET ORAL
Refills: 0 | Status: ACTIVE | COMMUNITY

## 2019-12-18 RX ORDER — METFORMIN HYDROCHLORIDE 625 MG/1
TABLET ORAL
Refills: 0 | Status: ACTIVE | COMMUNITY

## 2019-12-18 RX ORDER — GLIPIZIDE 2.5 MG/1
TABLET ORAL
Refills: 0 | Status: ACTIVE | COMMUNITY

## 2019-12-18 RX ORDER — SIMVASTATIN 40 MG/1
40 TABLET, FILM COATED ORAL
Refills: 0 | Status: ACTIVE | COMMUNITY

## 2019-12-18 RX ORDER — LINAGLIPTIN 5 MG/1
5 TABLET, FILM COATED ORAL
Refills: 0 | Status: ACTIVE | COMMUNITY

## 2019-12-18 RX ORDER — ICOSAPENT ETHYL 1000 MG/1
1 CAPSULE ORAL
Refills: 0 | Status: ACTIVE | COMMUNITY

## 2019-12-18 RX ORDER — LOSARTAN POTASSIUM 50 MG/1
50 TABLET, FILM COATED ORAL
Refills: 0 | Status: ACTIVE | COMMUNITY

## 2019-12-18 RX ORDER — EMPAGLIFLOZIN 25 MG/1
25 TABLET, FILM COATED ORAL
Refills: 0 | Status: ACTIVE | COMMUNITY

## 2019-12-18 RX ORDER — TAMSULOSIN HYDROCHLORIDE 0.4 MG/1
0.4 CAPSULE ORAL
Refills: 0 | Status: ACTIVE | COMMUNITY

## 2019-12-18 RX ORDER — METOPROLOL SUCCINATE 50 MG/1
50 TABLET, EXTENDED RELEASE ORAL
Refills: 0 | Status: ACTIVE | COMMUNITY

## 2019-12-18 NOTE — HISTORY OF PRESENT ILLNESS
[FreeTextEntry1] : HPI: 79 year old DM male s/p partial second ray amputation 11/14 presents to clinic after hospital visit. Pt was discharged on 11/18 and was unable to follow up until now. Pt states he has been taking showers and applying bacitracin and gauze. Denies f/c/n/v or any other pedal complaints. \par \par Allergies: rash from DM medication - unsure which medication\par PMH: DM\par PSH: Right partial 2nd ray amp 11/14\par Social: Denies tobacco, alcohol or illicit drugs

## 2019-12-18 NOTE — ASSESSMENT
[FreeTextEntry1] : VICENTE:\par Derm: Right 2nd digit amputation site with sutures d/c/i, no dehiscence noted, no periwound erythema or edema noted\par Vasc: DP/PT lightly palpable, cap refill < 5 secs, TG warm to cool\par Neuro: Light touch sensation grossly intact\par MSK: No pain on palpation to surgical site\par \par A: s/p Right 2nd partial ray amputation 11/14 - healed\par \par P:\par Patient evaluated, chart reviewed\par Sutures removed\par Applied betadine/DSD to surgical site\par Can take showers now\par Discussed ED precautions\par RTC 6 weeks

## 2020-01-29 ENCOUNTER — APPOINTMENT (OUTPATIENT)
Dept: WOUND CARE | Facility: CLINIC | Age: 80
End: 2020-01-29

## 2020-04-09 NOTE — DIETITIAN INITIAL EVALUATION ADULT. - PROBLEM SELECTOR PROBLEM 1
04/09/20        Philly Carrizales  [address] [place] [address]    To Whom It May Concern:    This is to certify Philly Carrizales was evaluated on 04/09/20 and is unable to return to work.    Philly Carrizales should self-quarantine until at least 14 days .  ?  CDC guidelines for return to work are as follows:  • At least 3 days (72 hours) have passed since fever resolution without use of fever reducing medication and   • Complete resolution of respiratory symptoms and  • Improvement of other symptoms and  • At least 7 days have passed since symptoms first appeared    Many employers are asking that employees be seen and reexamined to return to work. We ask that you follow the CDC guidelines above and that you do not ask that your employees be seen back in the clinic setting for a Return to Work slip when off due to presumed Covid related symptoms.    Obviously the Coronavirus is a rapidly evolving situation and recommendations are changing regularly to prevent spread of the disease and further loss of life.    Thank you for your understanding during these unusual times.        Electronically signed by:   Archana Juarez CNP                 [facility]   [address] [place], [place] [address]         Cellulitis of toe of right foot

## 2020-07-01 NOTE — H&P ADULT - NSHPPHYSICALEXAM_GEN_ALL_CORE
"""Phaco with IOL OD: 07/07/2020 Yulisa [id number] +23.0 Target: Amy Childers
Vital Signs Last 24 Hrs  T(C): 36.4 (04 Nov 2019 15:42), Max: 36.8 (04 Nov 2019 07:10)  T(F): 97.6 (04 Nov 2019 15:42), Max: 98.3 (04 Nov 2019 07:10)  HR: 75 (04 Nov 2019 15:42) (70 - 102)  BP: 134/74 (04 Nov 2019 15:42) (134/74 - 158/88)  BP(mean): --  RR: 16 (04 Nov 2019 15:42) (16 - 18)  SpO2: 95% (04 Nov 2019 15:42) (95% - 99%)      Alert and oriented, not in acute distress  HEENT- normocephalic; mouth moist  Cardiovascular: Regular rate and rhythm S1S2+  Respiratory: Breathing unlabored . clear to auscultation; no wheezing  Gastrointestinal:  Soft, nondistended, nontender , Bowel sounds are present o rebound or guarding  NEURO: AAOx3; non focal neurologic exam; cranial nerves grossly intact  PSYCH: normal affect and behavior  BACK: no swelling or mass3  skin : mid sternotomy scar (open heart surgery ) and laparoscopic abdominal scar   Extremities: right 2nd Toeleg Cellulitis with erythema, swelling, distally purple . warm to touch with some tenderness and cool temp at the tip

## 2020-08-13 NOTE — ED ADULT TRIAGE NOTE - BMI (KG/M2)
I have contacted  patient to schedule over due diabetic eye exam.  Eye exam 6-30-20 at [facility] with Dr Clifton  Requested  f# [phone]  
Received fax. This patient was not found in [facility].     
24.3

## 2020-10-14 NOTE — PATIENT PROFILE ADULT - INTERPRETER'S NAME
Christie Unique Flap 1 Text: A long triangle was marked on the dorsum of the nose extending up form the superior edge of the defect. First excision made on lateral edge of triangle to the superficial fat and undermined in the subcutaenous layer over the nasalis muscle. The medial edge is incised down to the cartilage and nasal bone. Undermining was performed beneath the nasalis muscle down the sidewall of the nose to the nasofacial sulcus. Once undermined, the myocutaneous flap is based on the angular artery in the nasofacial sulcus.

## 2020-10-22 NOTE — PHYSICAL THERAPY INITIAL EVALUATION ADULT - LEVEL OF INDEPENDENCE: GAIT, REHAB EVAL
Flu vaccine given in (L) deltoid per Dr. Christi Parrish' orders. Pt tolerated well. Kayce Gonzalez 49 # [id number] LOT #[id number] EXP 6/30/21.  1000mcg B12 given in (R) deltoid per Dr. Christi Parrish' orders.  Kayce Gonzalez  #[id number] LOT #[id number] EXP 8/2021
contact guard
independent

## 2021-04-09 ENCOUNTER — APPOINTMENT (OUTPATIENT)
Dept: DISASTER EMERGENCY | Facility: OTHER | Age: 81
End: 2021-04-09

## 2021-04-14 NOTE — PROGRESS NOTE ADULT - PROBLEM SELECTOR PLAN 2
- now s/p right partial 2nd ray amputation POD #3 (s/p angiogram 11/12/19)  -c/w aspirin/plavix  -vascular consult appreciated- unable to obtain P2Y12 test inpatient, can be obtained outpatient or at [facility] right upper arm

## 2022-09-21 NOTE — PROGRESS NOTE ADULT - NECK DETAILS
supple/no JVD
no JVD/supple
Otezla Counseling: The side effects of Otezla were discussed with the patient, including but not limited to worsening or new depression, weight loss, diarrhea, nausea, upper respiratory tract infection, and headache. Patient instructed to call the office should any adverse effect occur.  The patient verbalized understanding of the proper use and possible adverse effects of Otezla.  All the patient's questions and concerns were addressed.

## 2023-03-08 NOTE — ED PROVIDER NOTE - ATTESTATION, MLM
[Dear  ___] : Dear ~LAURI, [Consult Letter:] : I had the pleasure of evaluating your patient, [unfilled]. [Please see my note below.] : Please see my note below. [Consult Closing:] : Thank you very much for allowing me to participate in the care of this patient.  If you have any questions, please do not hesitate to contact me. [Sincerely,] : Sincerely, [FreeTextEntry2] : Ant Daniel MD\par [address]\par [address]\[place] [address]\par  [FreeTextEntry3] : Bony Hidalgo MD\par  I have reviewed and confirmed nurses' notes for patient's medications, allergies, medical history, and surgical history.

## 2023-07-11 NOTE — PROGRESS NOTE ADULT - PROBLEM/PLAN-5
DISPLAY PLAN FREE TEXT Abbe Flap (Upper To Lower Lip) Text: The defect of the lower lip was assessed and measured.  Given the location and size of the defect, an Abbe flap was deemed most appropriate. Using a sterile surgical marker, an appropriate Abbe flap was measured and drawn on the upper lip. Local anesthesia was then infiltrated.  A scalpel was then used to incise the upper lip through and through the skin, vermilion, muscle and mucosa, leaving the flap pedicled on the opposite side.  The flap was then rotated and transferred to the lower lip defect.  The flap was then sutured into place with a three layer technique, closing the orbicularis oris muscle layer with subcutaneous buried sutures, followed by a mucosal layer and an epidermal layer.

## 2024-02-01 NOTE — PATIENT PROFILE ADULT - DOES PATIENT HAVE ADVANCE DIRECTIVE
Writer spoke with anesthesia at this time and received permission to give patient her oral medications with a sip of water. Will give medications shortly.    no

## 2025-03-21 NOTE — PRE-OP CHECKLIST - NS PREOP CHK MONITOR ANESTHESIA CONSENT
It was great to see you! Below are the things we talked about today and your next steps.     Please call Cardiology at [phone] to establish care with a new cardiologist. I recommend Dr Membreno or Dr Morales.     Call our clinic if your symptoms worsen or if you have any other concerns.    
done